# Patient Record
Sex: MALE | Race: BLACK OR AFRICAN AMERICAN | NOT HISPANIC OR LATINO
[De-identification: names, ages, dates, MRNs, and addresses within clinical notes are randomized per-mention and may not be internally consistent; named-entity substitution may affect disease eponyms.]

---

## 2022-06-13 PROBLEM — Z00.00 ENCOUNTER FOR PREVENTIVE HEALTH EXAMINATION: Status: ACTIVE | Noted: 2022-06-13

## 2022-06-16 ENCOUNTER — APPOINTMENT (OUTPATIENT)
Dept: SURGERY | Facility: CLINIC | Age: 67
End: 2022-06-16
Payer: MEDICARE

## 2022-06-16 VITALS
BODY MASS INDEX: 26.93 KG/M2 | HEIGHT: 63 IN | SYSTOLIC BLOOD PRESSURE: 151 MMHG | DIASTOLIC BLOOD PRESSURE: 83 MMHG | HEART RATE: 85 BPM | WEIGHT: 152 LBS

## 2022-06-16 VITALS — TEMPERATURE: 97.3 F

## 2022-06-16 DIAGNOSIS — Z78.9 OTHER SPECIFIED HEALTH STATUS: ICD-10-CM

## 2022-06-16 DIAGNOSIS — I10 ESSENTIAL (PRIMARY) HYPERTENSION: ICD-10-CM

## 2022-06-16 DIAGNOSIS — K29.70 GASTRITIS, UNSPECIFIED, W/OUT BLEEDING: ICD-10-CM

## 2022-06-16 DIAGNOSIS — Z01.818 ENCOUNTER FOR OTHER PREPROCEDURAL EXAMINATION: ICD-10-CM

## 2022-06-16 PROCEDURE — 99203 OFFICE O/P NEW LOW 30 MIN: CPT

## 2022-06-16 RX ORDER — CHLORTHALIDONE 25 MG/1
25 TABLET ORAL
Qty: 90 | Refills: 0 | Status: ACTIVE | COMMUNITY
Start: 2022-06-09

## 2022-06-16 RX ORDER — AMLODIPINE BESYLATE 10 MG/1
10 TABLET ORAL
Qty: 90 | Refills: 0 | Status: ACTIVE | COMMUNITY
Start: 2022-05-24

## 2022-06-16 RX ORDER — SILDENAFIL 100 MG/1
100 TABLET, FILM COATED ORAL
Qty: 4 | Refills: 0 | Status: ACTIVE | COMMUNITY
Start: 2022-06-09

## 2022-06-16 NOTE — REVIEW OF SYSTEMS
[Fever] : no fever [Chills] : no chills [Feeling Poorly] : not feeling poorly [Chest Pain] : no chest pain [Lower Ext Edema] : no extremity edema [Shortness Of Breath] : no shortness of breath [Cough] : no cough [Abdominal Pain] : no abdominal pain [Skin Lesions] : no skin lesions [Skin Wound] : no skin wound [Itching] : no itching [Dizziness] : no dizziness [Anxiety] : no anxiety [Muscle Weakness] : no muscle weakness [Swollen Glands] : no swollen glands [FreeTextEntry8] : large bulge, left side of groin

## 2022-06-16 NOTE — PLAN
[FreeTextEntry1] : Mr. ALMA DELIA BENOIT  was informed of significance of findings. All options, risks and benefits were discussed at length. Informed consent for repair of left inguinoscrotal hernia, with the use of mesh, and the potential post op complications were discussed, including infection, recurrence and other related complications. \par The patient wishes to proceed with the planned surgery. We will schedule for surgery at the next available date, pending any required insurance pre-certification or pre-approval. Patient agrees to obtain any necessary pre-operative evaluations and testing prior to surgery.\par he was advised to seek immediate medical attention with any acute change in symptoms or with the development of any new or worsening symptoms. \par Patient's questions and concerns addressed to patient's satisfaction, and patient verbalized an understanding of the information discussed.\par \par \par Will schedule for the surgery at [facility] [place]. \par \par \par

## 2022-06-16 NOTE — PHYSICAL EXAM
[No Rash or Lesion] : No rash or lesion [Alert] : alert [Oriented to Person] : oriented to person [Oriented to Place] : oriented to place [Oriented to Time] : oriented to time [Calm] : calm [de-identified] : A/Ox3; NAD. appears comfortable [de-identified] : EOMI, sclera anicteric  [de-identified] : no cervical lymphadenopathy  [de-identified] : airway patent  [de-identified] : normal HR, no LE edema  [de-identified] : abd is soft, NT/ND\par  [de-identified] : No penile discharge or lesions. No scrotal swelling or discoloration. Testes descended bilaterally, smooth, without masses. Epididymis non-tender. Left Inguinoscrotal Hernia  [de-identified] : normal gait

## 2022-06-16 NOTE — CONSULT LETTER
[Dear  ___] : Dear  [unfilled], [Consult Letter:] : I had the pleasure of evaluating your patient, [unfilled]. [Consult Closing:] : Thank you very much for allowing me to participate in the care of this patient.  If you have any questions, please do not hesitate to contact me. [Sincerely,] : Sincerely, [FreeTextEntry3] : Arpan Dodge MD\par

## 2022-06-16 NOTE — ASSESSMENT
[FreeTextEntry1] : Mr. BENOIT is a 67 year y/o M who presents with large left inguinoscrotal hernia.

## 2022-06-16 NOTE — HISTORY OF PRESENT ILLNESS
[de-identified] : ALMA DELIA BENOIT is a 67 year old M who is referred to the office for consultation visit, he presents w the cc of having a bulge to the left side of groin, which is bothersome. He wants to know if he has a hernia.

## 2022-06-29 ENCOUNTER — OUTPATIENT (OUTPATIENT)
Dept: OUTPATIENT SERVICES | Facility: HOSPITAL | Age: 67
LOS: 1 days | End: 2022-06-29
Payer: MEDICARE

## 2022-06-29 VITALS
DIASTOLIC BLOOD PRESSURE: 82 MMHG | SYSTOLIC BLOOD PRESSURE: 147 MMHG | TEMPERATURE: 98 F | HEIGHT: 62 IN | RESPIRATION RATE: 16 BRPM | WEIGHT: 149.91 LBS | HEART RATE: 74 BPM

## 2022-06-29 DIAGNOSIS — Z98.890 OTHER SPECIFIED POSTPROCEDURAL STATES: Chronic | ICD-10-CM

## 2022-06-29 DIAGNOSIS — I10 ESSENTIAL (PRIMARY) HYPERTENSION: ICD-10-CM

## 2022-06-29 DIAGNOSIS — K40.90 UNILATERAL INGUINAL HERNIA, WITHOUT OBSTRUCTION OR GANGRENE, NOT SPECIFIED AS RECURRENT: ICD-10-CM

## 2022-06-29 DIAGNOSIS — Z01.818 ENCOUNTER FOR OTHER PREPROCEDURAL EXAMINATION: ICD-10-CM

## 2022-06-29 PROCEDURE — G0463: CPT

## 2022-06-29 RX ORDER — CHLORTHALIDONE 50 MG
1 TABLET ORAL
Qty: 0 | Refills: 0 | DISCHARGE

## 2022-06-29 RX ORDER — AMLODIPINE BESYLATE 2.5 MG/1
1 TABLET ORAL
Qty: 0 | Refills: 0 | DISCHARGE

## 2022-06-29 NOTE — H&P PST ADULT - NSICDXPASTMEDICALHX_GEN_ALL_CORE_FT
PAST MEDICAL HISTORY:  HTN (hypertension)     Unilateral inguinal hernia, without obstruction or gangrene, not specified as recurrent

## 2022-06-29 NOTE — H&P PST ADULT - PROBLEM SELECTOR PLAN 1
Continue antihypertensive meds and take with sips of water on day of surgery.  Cleared by PCP. Follow-up with PCP postop for management.

## 2022-06-29 NOTE — H&P PST ADULT - NSANTHOSAYNRD_GEN_A_CORE
No. SALUD screening performed.  STOP BANG Legend: 0-2 = LOW Risk; 3-4 = INTERMEDIATE Risk; 5-8 = HIGH Risk

## 2022-06-29 NOTE — H&P PST ADULT - HISTORY OF PRESENT ILLNESS
67 yr old male with history of HTN presents with unilateral inguinal hernia without obstruction or gangrene not specified as recurrent . Pt states he was reaching to fix something lost his balance and fell when he stood up he felt something in his left groin this was about 4 month ago. Pt had seen his doctor and was referred to surgeon. Pt schedule for repair of left inguinoscrotal hernia with mesh on 7/13/2022.

## 2022-06-29 NOTE — H&P PST ADULT - NSICDXFAMILYHX_GEN_ALL_CORE_FT
FAMILY HISTORY:  Mother  Still living? No  Known health problems: none, Age at diagnosis: Age Unknown

## 2022-06-29 NOTE — H&P PST ADULT - PROBLEM SELECTOR PLAN 2
schedule for repair of left inguinoscrotal hernia with mesh. Pt instructed to be NPO the night before surgery and the morning of surgery except for sip of water with BP medication. Pt instructed to wash with chlorhexidene 4% for three days including the morning of surgery. Pt provided with written instructions and verbal review with pt .    Stop Bang Score = 3 Pt is intermediate risk for SALUD. Pt will main patent airway during hospital stay.

## 2022-07-08 ENCOUNTER — OUTPATIENT (OUTPATIENT)
Dept: OUTPATIENT SERVICES | Facility: HOSPITAL | Age: 67
LOS: 1 days | End: 2022-07-08
Payer: MEDICARE

## 2022-07-08 DIAGNOSIS — Z98.890 OTHER SPECIFIED POSTPROCEDURAL STATES: Chronic | ICD-10-CM

## 2022-07-08 DIAGNOSIS — Z01.818 ENCOUNTER FOR OTHER PREPROCEDURAL EXAMINATION: ICD-10-CM

## 2022-07-08 DIAGNOSIS — K40.40 UNILATERAL INGUINAL HERNIA, WITH GANGRENE, NOT SPECIFIED AS RECURRENT: ICD-10-CM

## 2022-07-08 PROBLEM — K40.90 UNILATERAL INGUINAL HERNIA, WITHOUT OBSTRUCTION OR GANGRENE, NOT SPECIFIED AS RECURRENT: Chronic | Status: ACTIVE | Noted: 2022-06-29

## 2022-07-08 PROBLEM — I10 ESSENTIAL (PRIMARY) HYPERTENSION: Chronic | Status: ACTIVE | Noted: 2022-06-29

## 2022-07-08 LAB
ANION GAP SERPL CALC-SCNC: 8 MMOL/L — SIGNIFICANT CHANGE UP (ref 5–17)
APTT BLD: 37.4 SEC — HIGH (ref 27.5–35.5)
BUN SERPL-MCNC: 15 MG/DL — SIGNIFICANT CHANGE UP (ref 7–18)
CALCIUM SERPL-MCNC: 9.7 MG/DL — SIGNIFICANT CHANGE UP (ref 8.4–10.5)
CHLORIDE SERPL-SCNC: 101 MMOL/L — SIGNIFICANT CHANGE UP (ref 96–108)
CO2 SERPL-SCNC: 29 MMOL/L — SIGNIFICANT CHANGE UP (ref 22–31)
CREAT SERPL-MCNC: 1.15 MG/DL — SIGNIFICANT CHANGE UP (ref 0.5–1.3)
EGFR: 70 ML/MIN/1.73M2 — SIGNIFICANT CHANGE UP
GLUCOSE SERPL-MCNC: 151 MG/DL — HIGH (ref 70–99)
HCT VFR BLD CALC: 41.7 % — SIGNIFICANT CHANGE UP (ref 39–50)
HGB BLD-MCNC: 14.3 G/DL — SIGNIFICANT CHANGE UP (ref 13–17)
INR BLD: 1.08 RATIO — SIGNIFICANT CHANGE UP (ref 0.88–1.16)
MCHC RBC-ENTMCNC: 29.7 PG — SIGNIFICANT CHANGE UP (ref 27–34)
MCHC RBC-ENTMCNC: 34.3 GM/DL — SIGNIFICANT CHANGE UP (ref 32–36)
MCV RBC AUTO: 86.5 FL — SIGNIFICANT CHANGE UP (ref 80–100)
NRBC # BLD: 0 /100 WBCS — SIGNIFICANT CHANGE UP (ref 0–0)
PLATELET # BLD AUTO: 268 K/UL — SIGNIFICANT CHANGE UP (ref 150–400)
POTASSIUM SERPL-MCNC: 3.5 MMOL/L — SIGNIFICANT CHANGE UP (ref 3.5–5.3)
POTASSIUM SERPL-SCNC: 3.5 MMOL/L — SIGNIFICANT CHANGE UP (ref 3.5–5.3)
PROTHROM AB SERPL-ACNC: 12.9 SEC — SIGNIFICANT CHANGE UP (ref 10.5–13.4)
RBC # BLD: 4.82 M/UL — SIGNIFICANT CHANGE UP (ref 4.2–5.8)
RBC # FLD: 11.5 % — SIGNIFICANT CHANGE UP (ref 10.3–14.5)
SODIUM SERPL-SCNC: 138 MMOL/L — SIGNIFICANT CHANGE UP (ref 135–145)
WBC # BLD: 7.42 K/UL — SIGNIFICANT CHANGE UP (ref 3.8–10.5)
WBC # FLD AUTO: 7.42 K/UL — SIGNIFICANT CHANGE UP (ref 3.8–10.5)

## 2022-07-08 PROCEDURE — 71046 X-RAY EXAM CHEST 2 VIEWS: CPT

## 2022-07-08 PROCEDURE — 85730 THROMBOPLASTIN TIME PARTIAL: CPT

## 2022-07-08 PROCEDURE — 85027 COMPLETE CBC AUTOMATED: CPT

## 2022-07-08 PROCEDURE — 80048 BASIC METABOLIC PNL TOTAL CA: CPT

## 2022-07-08 PROCEDURE — 93005 ELECTROCARDIOGRAM TRACING: CPT

## 2022-07-08 PROCEDURE — 93010 ELECTROCARDIOGRAM REPORT: CPT

## 2022-07-08 PROCEDURE — 36415 COLL VENOUS BLD VENIPUNCTURE: CPT

## 2022-07-08 PROCEDURE — 71046 X-RAY EXAM CHEST 2 VIEWS: CPT | Mod: 26

## 2022-07-08 PROCEDURE — 85610 PROTHROMBIN TIME: CPT

## 2022-07-11 LAB — SARS-COV-2 N GENE NPH QL NAA+PROBE: NOT DETECTED

## 2022-07-12 ENCOUNTER — TRANSCRIPTION ENCOUNTER (OUTPATIENT)
Age: 67
End: 2022-07-12

## 2022-07-13 ENCOUNTER — TRANSCRIPTION ENCOUNTER (OUTPATIENT)
Age: 67
End: 2022-07-13

## 2022-07-13 ENCOUNTER — RESULT REVIEW (OUTPATIENT)
Age: 67
End: 2022-07-13

## 2022-07-13 ENCOUNTER — OUTPATIENT (OUTPATIENT)
Dept: OUTPATIENT SERVICES | Facility: HOSPITAL | Age: 67
LOS: 1 days | End: 2022-07-13
Payer: MEDICARE

## 2022-07-13 ENCOUNTER — APPOINTMENT (OUTPATIENT)
Dept: SURGERY | Facility: HOSPITAL | Age: 67
End: 2022-07-13

## 2022-07-13 VITALS
TEMPERATURE: 99 F | OXYGEN SATURATION: 98 % | HEIGHT: 62 IN | WEIGHT: 149.91 LBS | DIASTOLIC BLOOD PRESSURE: 82 MMHG | HEART RATE: 96 BPM | RESPIRATION RATE: 16 BRPM | SYSTOLIC BLOOD PRESSURE: 135 MMHG

## 2022-07-13 VITALS
RESPIRATION RATE: 16 BRPM | DIASTOLIC BLOOD PRESSURE: 76 MMHG | TEMPERATURE: 98 F | HEART RATE: 88 BPM | OXYGEN SATURATION: 98 % | SYSTOLIC BLOOD PRESSURE: 137 MMHG

## 2022-07-13 DIAGNOSIS — Z01.818 ENCOUNTER FOR OTHER PREPROCEDURAL EXAMINATION: ICD-10-CM

## 2022-07-13 DIAGNOSIS — K40.90 UNILATERAL INGUINAL HERNIA, WITHOUT OBSTRUCTION OR GANGRENE, NOT SPECIFIED AS RECURRENT: ICD-10-CM

## 2022-07-13 DIAGNOSIS — Z98.890 OTHER SPECIFIED POSTPROCEDURAL STATES: Chronic | ICD-10-CM

## 2022-07-13 PROCEDURE — 49505 PRP I/HERN INIT REDUC >5 YR: CPT

## 2022-07-13 PROCEDURE — 88304 TISSUE EXAM BY PATHOLOGIST: CPT | Mod: 26

## 2022-07-13 PROCEDURE — 49505 PRP I/HERN INIT REDUC >5 YR: CPT | Mod: LT

## 2022-07-13 PROCEDURE — C1781: CPT

## 2022-07-13 PROCEDURE — 88304 TISSUE EXAM BY PATHOLOGIST: CPT

## 2022-07-13 DEVICE — MESH HERNIA MARLEX 3X6IN: Type: IMPLANTABLE DEVICE | Site: LEFT | Status: FUNCTIONAL

## 2022-07-13 DEVICE — MESH HERNIA PLUG PERFIX LG 1.6X1.9IN: Type: IMPLANTABLE DEVICE | Site: LEFT | Status: FUNCTIONAL

## 2022-07-13 DEVICE — MESH HERNIA PLUG PERFIX XL 1.6X2IN: Type: IMPLANTABLE DEVICE | Site: LEFT | Status: FUNCTIONAL

## 2022-07-13 DEVICE — MESH HERNIA PLUG PERFIX MED 1.3X1.55IN: Type: IMPLANTABLE DEVICE | Site: LEFT | Status: FUNCTIONAL

## 2022-07-13 RX ORDER — FENTANYL CITRATE 50 UG/ML
25 INJECTION INTRAVENOUS
Refills: 0 | Status: DISCONTINUED | OUTPATIENT
Start: 2022-07-13 | End: 2022-07-13

## 2022-07-13 RX ORDER — OXYCODONE HYDROCHLORIDE 5 MG/1
1 TABLET ORAL
Qty: 3 | Refills: 0
Start: 2022-07-13

## 2022-07-13 RX ORDER — HYDROMORPHONE HYDROCHLORIDE 2 MG/ML
0.5 INJECTION INTRAMUSCULAR; INTRAVENOUS; SUBCUTANEOUS
Refills: 0 | Status: DISCONTINUED | OUTPATIENT
Start: 2022-07-13 | End: 2022-07-13

## 2022-07-13 RX ORDER — ONDANSETRON 8 MG/1
4 TABLET, FILM COATED ORAL ONCE
Refills: 0 | Status: DISCONTINUED | OUTPATIENT
Start: 2022-07-13 | End: 2022-07-13

## 2022-07-13 RX ORDER — ACETAMINOPHEN 500 MG
1000 TABLET ORAL ONCE
Refills: 0 | Status: DISCONTINUED | OUTPATIENT
Start: 2022-07-13 | End: 2022-07-13

## 2022-07-13 RX ORDER — SODIUM CHLORIDE 9 MG/ML
3 INJECTION INTRAMUSCULAR; INTRAVENOUS; SUBCUTANEOUS EVERY 8 HOURS
Refills: 0 | Status: DISCONTINUED | OUTPATIENT
Start: 2022-07-13 | End: 2022-07-13

## 2022-07-13 RX ADMIN — SODIUM CHLORIDE 3 MILLILITER(S): 9 INJECTION INTRAMUSCULAR; INTRAVENOUS; SUBCUTANEOUS at 06:51

## 2022-07-13 NOTE — BRIEF OPERATIVE NOTE - NSICDXBRIEFPROCEDURE_GEN_ALL_CORE_FT
PROCEDURES:  Repair, hernia, inguinal, open, using mesh, adult 13-Jul-2022 10:04:29 left side Ruth Higgins

## 2022-07-13 NOTE — ASU DISCHARGE PLAN (ADULT/PEDIATRIC) - ASU DC SPECIAL INSTRUCTIONSFT
Please follow-up with your surgeon in 1 week. Drink plenty of fluids and rest as needed. Call for any fever over 101, nausea, vomiting, severe pain, no passing of gas or bowel movement.     DIET   You may resume your regular diet as normal.     SURGICAL SITES   Remove outer dressing and keep white steri-strips in place allowing them to fall off on their own. You may shower 48 hours post-operatively but do not bathe or soak in the water for 1-2 weeks; pat dry. If you notice any signs of surgical site infection (ie. redness, swelling, pain, pus drainage), please seek medical care immediately.    ACTIVITY Do not lift anything heavier than 10 pounds for 2-3 months for hernia, no exercise for 2 weeks and avoid strenuous activity for 4-6 weeks.     PAIN CONTROL   You may take Motrin 600mg (with food) or Tylenol 650mg as needed for mild pain. Stagger one medication 3 hours after the other for maximum pain control. Maximum daily dose of Tylenol should not exceed 4grams/day.  You may take your prescribed oxycodone for severe breakthrough pain not that is not relieved by Tylenol/Motrin. Do not drive or make important decisions while taking this medication and do not take more than 4 pills in 24 hours.Please refer to medical records/ progress notes for in depth hospital course. Discharge plan discussed and agreed with attending.

## 2022-07-13 NOTE — ASU DISCHARGE PLAN (ADULT/PEDIATRIC) - NS MD DC FALL RISK RISK
For information on Fall & Injury Prevention, visit: [email or web address] OR  [email or web address] OR  https://www.cdc.gov/steadi/patient.html

## 2022-07-13 NOTE — ASU PREOP CHECKLIST - NS PREOP CHK TEST_COVID RESULT_GEN_ALL_CORE
Brief Intervention and Referral to Treatment Summary    Patient was provided PHQ-9, AUDIT and DAST Screening:      PHQ-9 Score: 23  AUDIT Score:  39  DAST Score:  3    Patients substance use is considered     Low Risk/Healthy  Moderate Risk  Harmful  Dependent X     Patients depression is considered:     Minimal  Mild   Moderate  Moderately Severe  Severe X     Brief Education Was Provided     Patient was receptive X   Patient was not receptive      Brief Intervention Is Provided (Only for AUDIT or DAST)     Patient reports readiness to decrease and/or stop use and a plan was discussed  X  Patient denies readiness to decrease and/or stop use and a plan was not discussed      Recommendations/Referrals for Brief and/or Specialized Treatment Provided to Patient     Pt was agreeable to referral to [facility]. Pt UDS was negative. Pt ETOH was .169. Pt reports daily drinking and would like to address his alcoholism.  Electronically signed by KRYSTEN Hurtado on 1/14/2022 at 2:21 PM Negative

## 2022-07-13 NOTE — ASU PATIENT PROFILE, ADULT - FALL HARM RISK - UNIVERSAL INTERVENTIONS
Bed in lowest position, wheels locked, appropriate side rails in place/Call bell, personal items and telephone in reach/Instruct patient to call for assistance before getting out of bed or chair/Non-slip footwear when patient is out of bed/[place] to call system/Physically safe environment - no spills, clutter or unnecessary equipment/Purposeful Proactive Rounding/Room/bathroom lighting operational, light cord in reach

## 2022-07-13 NOTE — ASU DISCHARGE PLAN (ADULT/PEDIATRIC) - CARE PROVIDER_API CALL
Arpan Dodge (MD)  Surgery  [address] [place], [place] [id number]  Phone: [phone]  Fax: [phone]  Follow Up Time: 1 week

## 2022-07-15 LAB — SURGICAL PATHOLOGY STUDY: SIGNIFICANT CHANGE UP

## 2022-07-21 ENCOUNTER — APPOINTMENT (OUTPATIENT)
Dept: SURGERY | Facility: CLINIC | Age: 67
End: 2022-07-21

## 2022-07-21 VITALS
HEART RATE: 75 BPM | WEIGHT: 148 LBS | OXYGEN SATURATION: 98 % | SYSTOLIC BLOOD PRESSURE: 144 MMHG | HEIGHT: 63 IN | DIASTOLIC BLOOD PRESSURE: 78 MMHG | BODY MASS INDEX: 26.22 KG/M2

## 2022-07-21 DIAGNOSIS — K40.90 UNILATERAL INGUINAL HERNIA, W/OUT OBSTRUCTION OR GANGRENE, NOT SPECIFIED AS RECURRENT: ICD-10-CM

## 2022-07-21 PROCEDURE — 99024 POSTOP FOLLOW-UP VISIT: CPT

## 2022-07-21 NOTE — ASSESSMENT
[FreeTextEntry1] : Mr. BENOIT is a 67 year y/o M, S/P Repair of left inguinoscrotal hernia with mesh, 07/13/22. Mr. BENOIT is without reported complaints. He denies fever, chills, or pain. Surgical wounds are healing well. No signs of inflammation, infection or exudate. No recurrence felt on exam. \par \par \par

## 2022-07-21 NOTE — HISTORY OF PRESENT ILLNESS
[de-identified] : FRANCISCO JAVIER BENOIT presents to the office for postoperative visit today, he is S/P Repair of left inguinoscrotal hernia with mesh, 07/13/22. Mr. BENOIT is without reported complaints. He denies fever, chills, or pain. \par \par

## 2022-07-21 NOTE — PHYSICAL EXAM
[No Rash or Lesion] : No rash or lesion [Alert] : alert [Oriented to Person] : oriented to person [Oriented to Place] : oriented to place [Oriented to Time] : oriented to time [Calm] : calm [de-identified] : A/Ox3; NAD. appears comfortable [de-identified] : airway patent, no use of accessory muscles [de-identified] : Surgical wound healing well. No signs of inflammation, infection or exudate. No recurrence

## 2024-12-25 ENCOUNTER — INPATIENT (INPATIENT)
Facility: HOSPITAL | Age: 69
LOS: 1 days | Discharge: NURSING, REHAB OR LONG-TERM CARE | End: 2024-12-27
Attending: INTERNAL MEDICINE | Admitting: INTERNAL MEDICINE
Payer: MEDICARE

## 2024-12-25 VITALS
TEMPERATURE: 98 F | SYSTOLIC BLOOD PRESSURE: 148 MMHG | DIASTOLIC BLOOD PRESSURE: 85 MMHG | RESPIRATION RATE: 20 BRPM | OXYGEN SATURATION: 95 % | HEART RATE: 96 BPM

## 2024-12-25 DIAGNOSIS — I63.9 CEREBRAL INFARCTION, UNSPECIFIED: ICD-10-CM

## 2024-12-25 DIAGNOSIS — I10 ESSENTIAL (PRIMARY) HYPERTENSION: ICD-10-CM
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
## 2024-12-25 DIAGNOSIS — Z98.890 OTHER SPECIFIED POSTPROCEDURAL STATES: Chronic | ICD-10-CM

## 2024-12-25 DIAGNOSIS — I63.50 CEREBRAL INFARCTION DUE TO UNSPECIFIED OCCLUSION OR STENOSIS OF UNSPECIFIED CEREBRAL ARTERY: ICD-10-CM

## 2024-12-25 DIAGNOSIS — R53.1 WEAKNESS: ICD-10-CM

## 2024-12-25 DIAGNOSIS — Z29.9 ENCOUNTER FOR PROPHYLACTIC MEASURES, UNSPECIFIED: ICD-10-CM

## 2024-12-25 DIAGNOSIS — S22.39XA FRACTURE OF ONE RIB, UNSPECIFIED SIDE, INITIAL ENCOUNTER FOR CLOSED FRACTURE: ICD-10-CM

## 2024-12-25 LAB
ALBUMIN SERPL ELPH-MCNC: 4.2 G/DL — SIGNIFICANT CHANGE UP (ref 3.3–5)
ALP SERPL-CCNC: 65 U/L — SIGNIFICANT CHANGE UP (ref 40–120)
ALT FLD-CCNC: 17 U/L — SIGNIFICANT CHANGE UP (ref 4–41)
ANION GAP SERPL CALC-SCNC: 16 MMOL/L — HIGH (ref 7–14)
APTT BLD: 34.9 SEC — SIGNIFICANT CHANGE UP (ref 24.5–35.6)
AST SERPL-CCNC: 20 U/L — SIGNIFICANT CHANGE UP (ref 4–40)
BASOPHILS # BLD AUTO: 0.02 K/UL — SIGNIFICANT CHANGE UP (ref 0–0.2)
BASOPHILS NFR BLD AUTO: 0.2 % — SIGNIFICANT CHANGE UP (ref 0–2)
BILIRUB SERPL-MCNC: 0.5 MG/DL — SIGNIFICANT CHANGE UP (ref 0.2–1.2)
BUN SERPL-MCNC: 13 MG/DL — SIGNIFICANT CHANGE UP (ref 7–23)
CALCIUM SERPL-MCNC: 9.9 MG/DL — SIGNIFICANT CHANGE UP (ref 8.4–10.5)
CHLORIDE SERPL-SCNC: 96 MMOL/L — LOW (ref 98–107)
CO2 SERPL-SCNC: 24 MMOL/L — SIGNIFICANT CHANGE UP (ref 22–31)
CREAT SERPL-MCNC: 1.03 MG/DL — SIGNIFICANT CHANGE UP (ref 0.5–1.3)
EGFR: 79 ML/MIN/1.73M2 — SIGNIFICANT CHANGE UP
EOSINOPHIL # BLD AUTO: 0 K/UL — SIGNIFICANT CHANGE UP (ref 0–0.5)
EOSINOPHIL NFR BLD AUTO: 0 % — SIGNIFICANT CHANGE UP (ref 0–6)
FLUAV AG NPH QL: SIGNIFICANT CHANGE UP
FLUBV AG NPH QL: SIGNIFICANT CHANGE UP
GLUCOSE SERPL-MCNC: 109 MG/DL — HIGH (ref 70–99)
HCT VFR BLD CALC: 42.6 % — SIGNIFICANT CHANGE UP (ref 39–50)
HGB BLD-MCNC: 14.6 G/DL — SIGNIFICANT CHANGE UP (ref 13–17)
IANC: 7.9 K/UL — HIGH (ref 1.8–7.4)
IMM GRANULOCYTES NFR BLD AUTO: 0.2 % — SIGNIFICANT CHANGE UP (ref 0–0.9)
INR BLD: 1.07 RATIO — SIGNIFICANT CHANGE UP (ref 0.85–1.16)
LYMPHOCYTES # BLD AUTO: 1.38 K/UL — SIGNIFICANT CHANGE UP (ref 1–3.3)
LYMPHOCYTES # BLD AUTO: 14.1 % — SIGNIFICANT CHANGE UP (ref 13–44)
MCHC RBC-ENTMCNC: 29.1 PG — SIGNIFICANT CHANGE UP (ref 27–34)
MCHC RBC-ENTMCNC: 34.3 G/DL — SIGNIFICANT CHANGE UP (ref 32–36)
MCV RBC AUTO: 85 FL — SIGNIFICANT CHANGE UP (ref 80–100)
MONOCYTES # BLD AUTO: 0.44 K/UL — SIGNIFICANT CHANGE UP (ref 0–0.9)
MONOCYTES NFR BLD AUTO: 4.5 % — SIGNIFICANT CHANGE UP (ref 2–14)
NEUTROPHILS # BLD AUTO: 7.9 K/UL — HIGH (ref 1.8–7.4)
NEUTROPHILS NFR BLD AUTO: 81 % — HIGH (ref 43–77)
NRBC # BLD: 0 /100 WBCS — SIGNIFICANT CHANGE UP (ref 0–0)
NRBC # FLD: 0 K/UL — SIGNIFICANT CHANGE UP (ref 0–0)
PLATELET # BLD AUTO: 289 K/UL — SIGNIFICANT CHANGE UP (ref 150–400)
POTASSIUM SERPL-MCNC: 3.7 MMOL/L — SIGNIFICANT CHANGE UP (ref 3.5–5.3)
POTASSIUM SERPL-SCNC: 3.7 MMOL/L — SIGNIFICANT CHANGE UP (ref 3.5–5.3)
PROT SERPL-MCNC: 8.6 G/DL — HIGH (ref 6–8.3)
PROTHROM AB SERPL-ACNC: 12.4 SEC — SIGNIFICANT CHANGE UP (ref 9.9–13.4)
RBC # BLD: 5.01 M/UL — SIGNIFICANT CHANGE UP (ref 4.2–5.8)
RBC # FLD: 11.4 % — SIGNIFICANT CHANGE UP (ref 10.3–14.5)
RSV RNA NPH QL NAA+NON-PROBE: SIGNIFICANT CHANGE UP
SARS-COV-2 RNA SPEC QL NAA+PROBE: SIGNIFICANT CHANGE UP
SODIUM SERPL-SCNC: 136 MMOL/L — SIGNIFICANT CHANGE UP (ref 135–145)
TROPONIN T, HIGH SENSITIVITY RESULT: 10 NG/L — SIGNIFICANT CHANGE UP
TROPONIN T, HIGH SENSITIVITY RESULT: 10 NG/L — SIGNIFICANT CHANGE UP
WBC # BLD: 9.76 K/UL — SIGNIFICANT CHANGE UP (ref 3.8–10.5)
WBC # FLD AUTO: 9.76 K/UL — SIGNIFICANT CHANGE UP (ref 3.8–10.5)

## 2024-12-25 PROCEDURE — 99233 SBSQ HOSP IP/OBS HIGH 50: CPT

## 2024-12-25 PROCEDURE — 71046 X-RAY EXAM CHEST 2 VIEWS: CPT | Mod: 26

## 2024-12-25 PROCEDURE — 70496 CT ANGIOGRAPHY HEAD: CPT | Mod: 26,MC

## 2024-12-25 PROCEDURE — 70498 CT ANGIOGRAPHY NECK: CPT | Mod: 26,MC

## 2024-12-25 PROCEDURE — 71100 X-RAY EXAM RIBS UNI 2 VIEWS: CPT | Mod: 26,LT

## 2024-12-25 PROCEDURE — 99285 EMERGENCY DEPT VISIT HI MDM: CPT

## 2024-12-25 PROCEDURE — 99497 ADVNCD CARE PLAN 30 MIN: CPT

## 2024-12-25 RX ORDER — ENOXAPARIN SODIUM 60 MG/.6ML
40 INJECTION INTRAVENOUS; SUBCUTANEOUS EVERY 24 HOURS
Refills: 0 | Status: DISCONTINUED | OUTPATIENT
Start: 2024-12-25 | End: 2024-12-27

## 2024-12-25 RX ORDER — ASPIRIN 81 MG
81 TABLET, DELAYED RELEASE (ENTERIC COATED) ORAL ONCE
Refills: 0 | Status: COMPLETED | OUTPATIENT
Start: 2024-12-25 | End: 2024-12-25

## 2024-12-25 RX ORDER — ATORVASTATIN CALCIUM 40 MG/1
80 TABLET, FILM COATED ORAL AT BEDTIME
Refills: 0 | Status: DISCONTINUED | OUTPATIENT
Start: 2024-12-25 | End: 2024-12-27

## 2024-12-25 RX ORDER — ACETAMINOPHEN 80 MG/.8ML
1000 SOLUTION/ DROPS ORAL ONCE
Refills: 0 | Status: COMPLETED | OUTPATIENT
Start: 2024-12-25 | End: 2024-12-25

## 2024-12-25 RX ORDER — GINKGO BILOBA 40 MG
3 CAPSULE ORAL AT BEDTIME
Refills: 0 | Status: DISCONTINUED | OUTPATIENT
Start: 2024-12-25 | End: 2024-12-27

## 2024-12-25 RX ORDER — CLOPIDOGREL BISULFATE 75 MG/1
300 TABLET, FILM COATED ORAL ONCE
Refills: 0 | Status: COMPLETED | OUTPATIENT
Start: 2024-12-25 | End: 2024-12-25

## 2024-12-25 RX ORDER — ACETAMINOPHEN 80 MG/.8ML
650 SOLUTION/ DROPS ORAL EVERY 6 HOURS
Refills: 0 | Status: DISCONTINUED | OUTPATIENT
Start: 2024-12-25 | End: 2024-12-26

## 2024-12-25 RX ORDER — LIDOCAINE 50 MG/G
1 OINTMENT TOPICAL DAILY
Refills: 0 | Status: DISCONTINUED | OUTPATIENT
Start: 2024-12-25 | End: 2024-12-26

## 2024-12-25 RX ORDER — INFLUENZA A VIRUS A/WISCONSIN/588/2019 (H1N1) RECOMBINANT HEMAGGLUTININ ANTIGEN, INFLUENZA A VIRUS A/DARWIN/6/2021 (H3N2) RECOMBINANT HEMAGGLUTININ ANTIGEN, INFLUENZA B VIRUS B/AUSTRIA/1359417/2021 RECOMBINANT HEMAGGLUTININ ANTIGEN, AND INFLUENZA B VIRUS B/PHUKET/3073/2013 RECOMBINANT HEMAGGLUTININ ANTIGEN 45; 45; 45; 45 UG/.5ML; UG/.5ML; UG/.5ML; UG/.5ML
0.5 INJECTION INTRAMUSCULAR ONCE
Refills: 0 | Status: DISCONTINUED | OUTPATIENT
Start: 2024-12-25 | End: 2024-12-27

## 2024-12-25 RX ORDER — CLOPIDOGREL BISULFATE 75 MG/1
75 TABLET, FILM COATED ORAL DAILY
Refills: 0 | Status: DISCONTINUED | OUTPATIENT
Start: 2024-12-25 | End: 2024-12-27

## 2024-12-25 RX ORDER — ASPIRIN 81 MG
81 TABLET, DELAYED RELEASE (ENTERIC COATED) ORAL DAILY
Refills: 0 | Status: DISCONTINUED | OUTPATIENT
Start: 2024-12-25 | End: 2024-12-27

## 2024-12-25 RX ADMIN — ACETAMINOPHEN 400 MILLIGRAM(S): 80 SOLUTION/ DROPS ORAL at 17:29

## 2024-12-25 RX ADMIN — CLOPIDOGREL BISULFATE 300 MILLIGRAM(S): 75 TABLET, FILM COATED ORAL at 21:08

## 2024-12-25 RX ADMIN — Medication 81 MILLIGRAM(S): at 21:08

## 2024-12-25 RX ADMIN — ACETAMINOPHEN 1000 MILLIGRAM(S): 80 SOLUTION/ DROPS ORAL at 17:43

## 2024-12-25 NOTE — H&P ADULT - CONVERSATION DETAILS
Goals of care discussion with the patient (face to face)  Asked about the patient's values, hopes, goals, and priorities  Patient would like to be FULL CODE for this admission   Patient would be okay want CPR or mechanical ventilation if needed for lifesaving measures  No Healthcare proxy or POA in place

## 2024-12-25 NOTE — H&P ADULT - HISTORY OF PRESENT ILLNESS
Rigo Da Silva is a 69-year-old male with a past medical history of HTN presenting with a one day history of left sided weakness and a ground level fall.    Patient notes that he was well on the night of 12/23. When he woke up on 12/24, he noticed the left side of his body was weaker. He did not notice vision or voice changes. No headache or pain elsewhere. This weakness continued through the day and night. On 12/25, he was walking along flat ground and then fell. He attributes this fall to weakness. He did not experience head strike or loss of consciousness. He did not experience lightheadedness, vision changes, chest pain, or palpitations before the fall. He called EMS and was brought to the hospital.    Denies fevers/chills, lightheadedness, palpitations, chest pain, shortness of breath, abdominal pain, vision changes, nausea/vomiting, diarrhea/constipation, sick contacts, recent travel.    ED course: VS on presentation: T 98.5, HR 96, /85, RR 20, 95% RA. Code stroke was called. 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 CXR with minimally displaced fracture at the angle of the left 10th rib. Given aspirin, clopidogrel 300 mg, acetaminophen.

## 2024-12-25 NOTE — CONSULT NOTE ADULT - SUBJECTIVE AND OBJECTIVE BOX
**STROKE CONSULT NOTE**    -------------------------------------------------------------------------------------------------------------------------------------------------------------------------------------------------------------------------    HPI: 68yo w PMH of HTN, comes in with left sided weakness which started 12/24???. LKW 12/23???  head trauma, HA, N/V  hx of stroke, seizure  social, family hx    PAST MEDICAL & SURGICAL HISTORY:  HTN (hypertension)      Unilateral inguinal hernia, without obstruction or gangrene, not specified as recurrent      H/O hernia repair  right      History of surgery on arm  fx with metal          FAMILY HISTORY:  Known health problems: none (Mother)        SOCIAL HISTORY:  Smoking Cessation:    MEDICATIONS  (STANDING):  acetaminophen   IVPB .. 1000 milliGRAM(s) IV Intermittent once    MEDICATIONS  (PRN):      Allergies    No Known Allergies    Intolerances        --------------------------------------------------------------------------------------------------------------------------------------------------------------------------------------------------------------------    Vital Signs Last 24 Hrs  T(C): 36.9 (25 Dec 2024 15:10), Max: 36.9 (25 Dec 2024 15:10)  T(F): 98.5 (25 Dec 2024 15:10), Max: 98.5 (25 Dec 2024 15:10)  HR: 96 (25 Dec 2024 15:10) (96 - 96)  BP: 148/85 (25 Dec 2024 15:10) (148/85 - 148/85)  BP(mean): --  RR: 20 (25 Dec 2024 15:10) (20 - 20)  SpO2: 95% (25 Dec 2024 15:10) (95% - 95%)        Fingerstick Blood Glucose: CAPILLARY BLOOD GLUCOSE      POCT Blood Glucose.: 170 mg/dL (25 Dec 2024 15:20)      PHYSICAL EXAM:   INCOMPLETE    General - NAD  Cardiovascular - Peripheral pulses palpable, no edema    NEURO:    Mental status - Awake, Alert, Oriented to person, place, and time. Speech fluent, repetition and naming intact. Follows simple and complex commands. Attention/concentration, and fund of knowledge intact.    Cranial nerves -   PERRL, VFF, EOMI,   face sensation (V1-V3) intact b/l,   facial strength intact without asymmetry b/l,   hearing intact b/l,   palate with symmetric elevation,   trapezius 5/5 strength b/l,   tongue midline on protrusion with full lateral movement    Motor - Normal bulk and tone throughout. No pronator drift.  Strength testing            Deltoid      Biceps      Triceps     Wrist Extension    Wrist Flexion     Interossei         R            5                 5               5                     5                              5                        5                 5  L             5                 5               5                     5                              5                        5                 5              Hip Flexion    Hip Extension    Knee Flexion    Knee Extension    Dorsiflexion    Plantar Flexion  R              5                           5                       5                           5                            5                          5  L              5                           5                        5                           5                            5                          5    Sensation - Light touch AND/OR vibration intact throughout    DTR's -             Biceps      Triceps     Brachioradialis      Patellar    Ankle    Toes/plantar response  R             2+             2+                  2+                       2+            2+                 Down  L              2+             2+                 2+                        2+           2+                 Down    Coordination - Finger to Nose intact b/l. Heel to [place] intact b/l. No tremors appreciated    Gait and station - Normal casual gait. Romberg (-)    ---------------------------------------------------------------------------------------------------------------------------------------------------------------------------------------------------------------------------    Labs:          CAPILLARY BLOOD GLUCOSE      POCT Blood Glucose.: 170 mg/dL (25 Dec 2024 15:20)          CSF:                  Radiology:   **STROKE CONSULT NOTE**    -------------------------------------------------------------------------------------------------------------------------------------------------------------------------------------------------------------------------    HPI: 68yo w PMH of HTN, comes in with left sided weakness which started 12/24 0600 when he woke up. LKW 12/23 2200. Weakness started with the leg and quickly progressed to arm, denies any face weakness, numbness, any speech or vision changes. Weakness worsened to a point where he had a fall while walking on 12/25, hurt his chest and head on left side. Currently denying any headache just mild chest pain. No hx of stroke, seizure, headache. No hx of smoking, occasional alcohol use, no ilicit drug use.        PAST MEDICAL & SURGICAL HISTORY:  HTN (hypertension)      Unilateral inguinal hernia, without obstruction or gangrene, not specified as recurrent      H/O hernia repair  right      History of surgery on arm  fx with metal          FAMILY HISTORY:  Known health problems: none (Mother)        SOCIAL HISTORY:  Smoking Cessation:    MEDICATIONS  (STANDING):  acetaminophen   IVPB .. 1000 milliGRAM(s) IV Intermittent once    MEDICATIONS  (PRN):      Allergies    No Known Allergies    Intolerances        --------------------------------------------------------------------------------------------------------------------------------------------------------------------------------------------------------------------    Vital Signs Last 24 Hrs  T(C): 36.9 (25 Dec 2024 15:10), Max: 36.9 (25 Dec 2024 15:10)  T(F): 98.5 (25 Dec 2024 15:10), Max: 98.5 (25 Dec 2024 15:10)  HR: 96 (25 Dec 2024 15:10) (96 - 96)  BP: 148/85 (25 Dec 2024 15:10) (148/85 - 148/85)  BP(mean): --  RR: 20 (25 Dec 2024 15:10) (20 - 20)  SpO2: 95% (25 Dec 2024 15:10) (95% - 95%)        Fingerstick Blood Glucose: CAPILLARY BLOOD GLUCOSE      POCT Blood Glucose.: 170 mg/dL (25 Dec 2024 15:20)      PHYSICAL EXAM:       General - NAD  Cardiovascular - Peripheral pulses palpable, no edema    NEURO:    Mental status - Awake, Alert, Oriented to person, place, and time. Speech fluent, repetition and naming intact. Follows simple and complex commands.     Cranial nerves -   PERRL, VFF, EOMI,   face sensation (V1-V3) intact b/l,   facial strength intact without asymmetry b/l,   hearing intact b/l,   palate with symmetric elevation,   trapezius 5/5 strength b/l,   tongue midline on protrusion with full lateral movement    Motor - Normal bulk and tone throughout. some antigravity movement of LUE  Strength testing            Deltoid      Biceps      Triceps     Wrist Extension    Wrist Flexion     Interossei         R            5                 5               5                     5                              5                        5                 5  L             3                 3               3                     3                              3                        3                 3              Hip Flexion    Hip Extension    Knee Flexion    Knee Extension    Dorsiflexion    Plantar Flexion  R              5                           5                       5                           5                            5                          5  L              3                           3                        3                          3                            3                          3    Sensation - Light touch intact throughout    DTR's -  1+ throughout, plantars mute    Coordination - Finger to Nose intact on right, unable to check on left. Heel to [place] intact b/l.     Gait and station - unable to test due to pt safety    ---------------------------------------------------------------------------------------------------------------------------------------------------------------------------------------------------------------------------    Labs:          CAPILLARY BLOOD GLUCOSE      POCT Blood Glucose.: 170 mg/dL (25 Dec 2024 15:20)          CSF:                  Radiology:  [facility] read pending, to my eyes, no acute infarct

## 2024-12-25 NOTE — ED ADULT NURSE NOTE - NSFALLHARMRISKINTERV_ED_ALL_ED

## 2024-12-25 NOTE — ED PROVIDER NOTE - CLINICAL SUMMARY MEDICAL DECISION MAKING FREE TEXT BOX
69M p/w L side weakness arm>leg x ~32 hours.  Pt went to bed at about 10-11pm 12/23 and woke up with L side weakness.  Today he was trying to walk and fell landing on L ribs, pt says due to the weakness he fell.  No arm or leg injury, no head injury.  Family report he had slight slurred speech this morning but it got better now.  Pt reports mild dysuria.  PMHX HTN on amlodipine and ?HCTZ.  Not on AC.  PMHX hernia repair, R forearm surgery.  No T.  NKDA.  VS wnl.  On exam mild distress L ribs pain, normal speech per family (english 2nd language).  L ribs ttp no deformity, tender L ribs.  No ecchymosis.  Some L CVAT also, Abd nontender.  L arm weakness 3/5, significantly decreased  strength.  LLE slight drift but distal flex ex of foot intact.  No sensory deficit.  Face symmetric, speech fluent.  Impression stroke L side weakness arm > leg.  Also L ribs ttp rule out fx.  Check CTA H/N eval for ICH or stroke.  Xray rib series.  check labs.  Likely admit for stroke workup - MRI, echo, tele, PT.  Pt will likely need rehab.

## 2024-12-25 NOTE — H&P ADULT - PROBLEM SELECTOR PLAN 3
Feel from ground level attributed to weakness. Less concern for cardiac etiology or syncope without lightheadedness, palpitations, loss of consciousness.    Plan:  - telemetry   - ambulate with assistance  - fall precautions  - PT/OT consult

## 2024-12-25 NOTE — CONSULT NOTE ADULT - ATTENDING COMMENTS
68yo w PMH of HTN, comes in with left sided weakness  O/E LUE no effort against gravity, LLE some effort against gravity NIHSS 5  CTH subtle R Pontine hypoattenuation  CTA: Proximal occlusion of the left vertebral artery with distal reconstitution at the level of C2-C3. Mild R ICA stenosis. left M! stenosis, right P2, midbasilar stenosis      Continue with plan above

## 2024-12-25 NOTE — PATIENT PROFILE ADULT - FALL HARM RISK - HARM RISK INTERVENTIONS

## 2024-12-25 NOTE — H&P ADULT - PROBLEM SELECTOR PLAN 4
CXR with minimally displaced fracture seen at the angle of the left 10th rib.    Plan:  - pain control: lidocaine patches, acetaminophen

## 2024-12-25 NOTE — H&P ADULT - NSHPPHYSICALEXAM_GEN_ALL_CORE
Vital Signs Last 24 Hrs  T(C): 36.7 (25 Dec 2024 20:44), Max: 36.9 (25 Dec 2024 15:10)  T(F): 98.1 (25 Dec 2024 20:44), Max: 98.5 (25 Dec 2024 15:10)  HR: 82 (25 Dec 2024 20:44) (76 - 96)  BP: 159/81 (25 Dec 2024 20:44) (141/81 - 159/81)  BP(mean): --  RR: 18 (25 Dec 2024 20:44) (14 - 20)  SpO2: 100% (25 Dec 2024 20:44) (95% - 100%)    Parameters below as of 25 Dec 2024 20:44  Patient On (Oxygen Delivery Method): room air        PHYSICAL EXAM:  GENERAL: NAD, well-groomed, well-developed  HEAD:  Atraumatic, Normocephalic  EYES: EOMI, PERRLA, conjunctiva and sclera clear, arcus senilis present in bilateral eyes, peripheral vision intact  ENMT: Moist mucous membranes  NECK: Supple  NERVOUS SYSTEM: AOX3, facial sensation intact, facial strength intact, tongue midline with full lateral movement, right upper and lower extremities 5/5 in deltoid, bicep, wrist extension/flexion, interossei. Left upper and lower extremities with 2-3/5 throughout.  PSYCHIATRIC: Appropriate affect and mood  CHEST/LUNG: Clear to auscultation bilaterally; No rales, rhonchi, wheezing, or rubs  HEART: Regular rate and rhythm; No murmurs, rubs, or gallops. No LE edema  ABDOMEN: Soft, Nontender, Nondistended;  EXTREMITIES: No clubbing, cyanosis  SKIN: No rashes or lesions

## 2024-12-25 NOTE — ED ADULT NURSE NOTE - OBJECTIVE STATEMENT
pt presenting to ER with c/o L sided weakness x 2 days.  also reports fall this morning landing on left side of body.  denies head strike or LOC.  pt speaking in full sentences, MANRIQUEZ's.  no changes in speech noted.  labs collected.  pending CTA head and neck r/o stroke.  will cont to monitor.

## 2024-12-25 NOTE — H&P ADULT - NSICDXFAMILYHX_GEN_ALL_CORE_FT
FAMILY HISTORY:  Mother  Still living? No  Known health problems: none, Age at diagnosis: Age Unknown     FAMILY HISTORY:  Mother  Still living? No  Known health problems: none, Age at diagnosis: Age Unknown    Child  Still living? Unknown  FH: stroke, Age at diagnosis: 31-40

## 2024-12-25 NOTE — H&P ADULT - NSHPLABSRESULTS_GEN_ALL_CORE
LABS:                        14.6   9.76  )-----------( 289      ( 25 Dec 2024 17:00 )             42.6     25 Dec 2024 17:00    136    |  96     |  13     ----------------------------<  109    3.7     |  24     |  1.03     Ca    9.9        25 Dec 2024 17:00    TPro  8.6    /  Alb  4.2    /  TBili  0.5    /  DBili  x      /  AST  20     /  ALT  17     /  AlkPhos  65     25 Dec 2024 17:00    PT/INR - ( 25 Dec 2024 17:00 )   PT: 12.4 sec;   INR: 1.07 ratio         PTT - ( 25 Dec 2024 17:00 )  PTT:34.9 sec  CAPILLARY BLOOD GLUCOSE      POCT Blood Glucose.: 170 mg/dL (25 Dec 2024 15:20)    BLOOD CULTURE    RADIOLOGY & ADDITIONAL TESTS:    Imaging Personally Reviewed:  [ ] YES LABS:                        14.6   9.76  )-----------( 289      ( 25 Dec 2024 17:00 )             42.6     25 Dec 2024 17:00    136    |  96     |  13     ----------------------------<  109    3.7     |  24     |  1.03     Ca    9.9        25 Dec 2024 17:00    TPro  8.6    /  Alb  4.2    /  TBili  0.5    /  DBili  x      /  AST  20     /  ALT  17     /  AlkPhos  65     25 Dec 2024 17:00    PT/INR - ( 25 Dec 2024 17:00 )   PT: 12.4 sec;   INR: 1.07 ratio         PTT - ( 25 Dec 2024 17:00 )  PTT:34.9 sec  CAPILLARY BLOOD GLUCOSE      POCT Blood Glucose.: 170 mg/dL (25 Dec 2024 15:20)    BLOOD CULTURE    RADIOLOGY & ADDITIONAL TESTS:    Imaging Personally Reviewed:  [ ] YES    < from: CT Angio Head w/ IV Cont (12.25.24 @ 18:26) >    IMPRESSION:    CT HEAD:  1. Subtle decreased attenuation appreciated within a right pontine   location. Cannot exclude an ischemic event in this location. Consider   further evaluation via MR imaging to include DWI and ADC mapping   techniques, provided there are no contraindications.  2. No abnormal intraparenchymal or leptomeningeal enhancement.    CTA NECK:  1. Proximal occlusion of the left vertebral artery with distal   reconstitution at the level of C2-C3..  2. Right vertebral artery is unremarkable in appearance.  3. Mild stenosis of approximately 33% at the proximal aspect of the right   internal carotid artery with their is deposition of soft and calcified   plaque.  4. Deposition of soft and calcified plaque along the proximal aspect of   the left internal carotid artery, without significant stenosis.    CTA HEAD:  1. No large vessel occlusion.  2. Moderate short segment stenosis involving the M1 segment of the left   middle cerebral artery.  3. Short segment moderate stenosis involving the right P2 segment.  4. Right P1 segment is hypoplastic. There is relative fetal origin of the   right posterior cerebral artery.  5. Short segment moderate stenosis involving the midsegment of the   basilar artery.  6. No aneurysm identified. Tiny aneurysms can be beyond the resolution of  CTA technique.    < end of copied text >

## 2024-12-25 NOTE — H&P ADULT - PROBLEM SELECTOR PLAN 5
Has a history of hypertension, states that he takes a medication for this but does not remember which kind.    Plan:  - reach out to pharmacy in AM for medication reconciliation  - continue home antihypertensives

## 2024-12-25 NOTE — H&P ADULT - ATTENDING COMMENTS
I performed a history and physical exam of this patient and discussed the management with the resident listed here. I reviewed the note, agree with the documented findings and plan of care except as noted in my additions and changes below:    70yo w PMH of HTN presenting w/ left sided weakness and group level fall c/f acute ischemic stroke and rib fracture.     #CVA  #Left 10th rib fracture s/p fall  #HTN    CTA Head & Neck reviewed   Subtle decreased attenuation appreciated within a right pontine location  Proximal occlusion of the left vertebral artery with distal reconstitution at the level of C2-C3  Mild stenosis of approximately 33% at the proximal aspect of the right internal carotid artery  Moderate short segment stenosis involving the M1 segment of the left middle cerebral artery  Short segment moderate stenosis involving the right P2 segment  Short segment moderate stenosis involving the midsegment of the basilar artery    -NIHSS 4, LKW 12/23 2200   -Aspirin 81 and Plavix 300mg loading dose then ASA81/[id number] QD  -LDL A1c, TSH in AM  -Start Atorvastatin 80 mg QHS  -PT/OT evaluation  -TTE w/ bubble study  -MRI brain w/o contrast to look at the extent and distribution of the stroke   -Telemonitoring; Neurochecks and Vital signs Q4H  -Gradual normotension <130/80  -NPO until clears Dysphagia screen  -Aspiration, fall precautions  -Stroke education and counseling  -Neurology consulted. Appreciate Recs  -Pain control for rib fracture w/ lidocaine patch, Tylenol PRN  -DVT ppx

## 2024-12-25 NOTE — ED ADULT NURSE NOTE - CHIEF COMPLAINT QUOTE
last normal Dec 23  22:30 pt woke up with some left side weakness  08:30 am yesterday  c/o left side rib pain s/p fall today due to weakness  Not A CodE StrOkE as Per Dr Claudio

## 2024-12-25 NOTE — CONSULT NOTE ADULT - ASSESSMENT
Assessment:    Impression:    Recs:   Assessment:  68yo w PMH of HTN, comes in with left sided weakness which started  0600 when he woke up. LKW 2200. Weakness started with the leg and quickly progressed to arm, denies any face weakness, numbness, any speech or vision changes. Weakness worsened to a point where he had a fall while walking on , hurt his chest and head on left side. Currently denying any headache just mild chest pain. No hx of stroke, seizure, headache. No hx of smoking, occasional alcohol use, no ilicit drug use. Doesn't take any antithrombotics. Exam shows profound right arm and leg weakness, no numbness. CTH pending read    LKW 2200  MRS 0  NIHSS 4(CONRADO DEWITT)    Impression: Acute right arm and leg weakness localizes to left cerebral dysfunction. Etiology could be acute ischemic stroke. Mechanism likely .     Recs:  [] Aspirin 81 and Plavix 300mg loading dose then ASA81/Icvedh47 for 3 weeks followed by ASA 81 alone per [facility]ANCE trial  [] Atorvastatin 80, titrate to LDL<70  [] DVT prophylaxis    Imaging  [] f/u CTH, CTA read  [] MRI brain w/o contrast to look at the extent and distribution of the stroke   [] TTE without bubble study and telemetry to look for a cardiac source of embolism.  [] +/- ILR depending on TTE    Labs  [] HbA1C and Lipid Panel    Other  [] Telemonitoring;  Neurochecks and Vital signs q4h  [] Gradual normotension <130/80  [] BGM goals 140-180  [] PT/OT evaluation  [] NPO until clears Dysphagia screen, otherwise Swallow evaluation  [] Stroke education provided    Case discussed wiastrid stroke fellow Dr. Farley under the supervision of attending Dr. Libman   Assessment:  70yo w PMH of HTN, comes in with left sided weakness which started  0600 when he woke up. LKW 2200. Weakness started with the leg and quickly progressed to arm, denies any face weakness, numbness, any speech or vision changes. Weakness worsened to a point where he had a fall while walking on , hurt his chest and head on left side. Currently denying any headache just mild chest pain. No hx of stroke, seizure, headache. No hx of smoking, occasional alcohol use, no ilicit drug use. Doesn't take any antithrombotics. Exam shows profound left arm and leg weakness, no numbness. CTH pending read    LKW 2200  MRS 0  NIHSS 4(CONRADO DEWITT)    Impression: Acute left arm and leg weakness localizes to right cerebral dysfunction. Etiology could be acute ischemic stroke. Mechanism likely .     Recs:  [] Aspirin 81 and Plavix 300mg loading dose then ASA81/Btprtu83 for 3 weeks followed by ASA 81 alone per [facility]ANCE trial  [] Atorvastatin 80, titrate to LDL<70  [] DVT prophylaxis    Imaging  [] f/u CTH, CTA read  [] MRI brain w/o contrast to look at the extent and distribution of the stroke   [] TTE without bubble study and telemetry to look for a cardiac source of embolism.  [] +/- ILR depending on TTE    Labs  [] HbA1C and Lipid Panel    Other  [] Telemonitoring;  Neurochecks and Vital signs q4h  [] Gradual normotension <130/80  [] BGM goals 140-180  [] PT/OT evaluation  [] NPO until clears Dysphagia screen, otherwise Swallow evaluation  [] Stroke education provided    Case discussed wiastrid stroke fellow Dr. Farley under the supervision of attending Dr. Libman   Assessment:  68yo w PMH of HTN, comes in with left sided weakness which started  0600 when he woke up. LKW 2200. Weakness started with the leg and quickly progressed to arm, denies any face weakness, numbness, any speech or vision changes. Weakness worsened to a point where he had a fall while walking on , hurt his chest and head on left side. Currently denying any headache just mild chest pain. No hx of stroke, seizure, headache. No hx of smoking, occasional alcohol use, no ilicit drug use. Doesn't take any antithrombotics. Exam shows profound left arm and leg weakness, no numbness. CTH pending read    LKW 2200  MRS 0  NIHSS 4(CONRADO DEWITT)    Impression: Acute left arm and leg weakness localizes to right cerebral dysfunction. Etiology could be acute ischemic stroke. Mechanism likely .     Recs:  [] Aspirin 81 and Plavix 300mg loading dose then ASA81/Tplccl62 for 2 weeks as per [facility] trial  [] Atorvastatin 80, titrate to LDL<70  [] DVT prophylaxis    Imaging  [] MRI brain w/o contrast to look at the extent and distribution of the stroke   [] TTE without bubble study and telemetry to look for a cardiac source of embolism.  [] ILR depending on TTE (STROKE AF)    Labs  [] HbA1C and Lipid Panel    Other  [] Telemonitoring;  Neurochecks and Vital signs q4h  [] Gradual normotension <130/80  [] BGM goals 140-180  [] PT/OT evaluation  [] NPO until clears Dysphagia screen, otherwise Swallow evaluation  [] Stroke education provided    Case discussed wioth stroke fellow Dr. Farley under the supervision of attending Dr. Libman

## 2024-12-25 NOTE — H&P ADULT - PROBLEM SELECTOR PLAN 2
Acute weakness likely 2/2 right pontine stroke.    Plan:  - fall precautions  - PT/OT consulted  - stroke management as above

## 2024-12-25 NOTE — ED PROVIDER NOTE - INTERNATIONAL TRAVEL
Patient states she had foot surgery with Dr Tillman on Tuesday 7/16/19 and pain medication RX [place] they gave her is not helping with her pain, would like something stronger to be called in [facility]. Please advise     No

## 2024-12-25 NOTE — H&P ADULT - PROBLEM SELECTOR PLAN 1
Plan:    - aspiration precautions  - q4h neurochecks  - q4h vitals CT with    Plan:    - aspiration precautions  - q4h neurochecks  - q4h vitals Patient presenting with acute onset left sided weakness and a CT head with hypoattenuation in the right pontine location concerning for an acute ischemic stroke. Presented >24 hours from symptom onset. Patient without history of HLD, but has family history of stroke in 30s.  - s/p aspirin and plavix loading dose    Plan:  - neurology consulted, recs appreciated  - MRI brain w/o contrast for stroke evaluation  - TTE with bubble to evaluate for PFO  - aspirin 81 mg daily  - Plavix 75 mg daily  - atorvastatin 80 mg  - aspiration precautions  - q4h neurochecks  - q4h vitals  - Lipid panel in AM  - hgbA1c in AM

## 2024-12-25 NOTE — H&P ADULT - PROBLEM SELECTOR PLAN 6
Diet: NPO until bedside dysphagia screen  DVT prophylaxis: Lovenox  PT/OT consulted, recs appreciated  Code: full  Dispo: anticipate rehabilitation, pending workup

## 2024-12-25 NOTE — ED PROVIDER NOTE - OBJECTIVE STATEMENT
69M p/w L side weakness arm>leg x ~32 hours.  Pt went to bed at about 10-11pm 12/23 and woke up with L side weakness.  Today he was trying to walk and fell landing on L ribs, pt says due to the weakness he fell.  No arm or leg injury, no head injury.  Family report he had slight slurred speech this morning but it got better now.  Pt reports mild dysuria.  PMHX HTN on amlodipine and ?HCTZ.  Not on AC.  PMHX hernia repair, R forearm surgery.  No T.  NKDA.  VS wnl.  On exam mild distress L ribs pain, normal speech per family (english 2nd language).  L ribs ttp no deformity, tender L ribs.  No ecchymosis.  Some L CVAT also, Abd nontender.  L arm weakness 3/5, significantly decreased  strength.  LLE slight drift but distal flex ex of foot intact.  No sensory deficit.  Face symmetric, speech fluent.  Impression stroke L side weakness arm > leg.  Also L ribs ttp rule out fx.  Check CTA H/N eval for ICH or stroke.  Xray rib series.  check labs.  Likely admit for stroke workup - MRI, echo, tele, PT.  Pt will likely need rehab.    VS:  unremarkable    GEN - NAD;   well appearing;   A+O x3   HEAD - NC/AT     ENT - PEERL, EOMI, mucous membranes    dry , no discharge      NECK: Neck supple, non-tender without lymphadenopathy, no masses, no JVD  PULM - CTA b/l,  symmetric breath sounds.  L lower lateral ribs ttp w/o deformity or ecchymosis  COR -  normal heart sounds    ABD - , ND, NT, soft,  BACK -(+)L CVA tenderness, nontender spine     EXTREMS - no edema, no deformity, warm and well perfused    SKIN - no rash    or bruising      NEUROLOGIC - alert, face symmetric, speech fluent, sensation nl, motor L side weakness arm>>leg.

## 2024-12-25 NOTE — H&P ADULT - ASSESSMENT
Rigo Da Silva is a 69-year-old male with a past medical history of HTN presenting with a one day history of left sided weakness and a ground level fall concerning for an acute ischemic stroke and found to have a rib fracture. Code stroke was called and neurology consulted.

## 2024-12-25 NOTE — ED ADULT TRIAGE NOTE - CHIEF COMPLAINT QUOTE
last normal Dec 23  22:30 pt woke up with some left side weakness  08:30 am yesterday  c/o left side rib pain s/p fall today due to weakness last normal Dec 23  22:30 pt woke up with some left side weakness  08:30 am yesterday  c/o left side rib pain s/p fall today due to weakness  Not A CodE StrOkE as Per Dr Claudio

## 2024-12-25 NOTE — ED PROVIDER NOTE - PHYSICAL EXAMINATION
VS:  unremarkable    GEN - NAD;   well appearing;   A+O x3   HEAD - NC/AT     ENT - PEERL, EOMI, mucous membranes    dry , no discharge      NECK: Neck supple, non-tender without lymphadenopathy, no masses, no JVD  PULM - CTA b/l,  symmetric breath sounds.  L lower lateral ribs ttp w/o deformity or ecchymosis  COR -  normal heart sounds    ABD - , ND, NT, soft,  BACK -(+)L CVA tenderness, nontender spine     EXTREMS - no edema, no deformity, warm and well perfused    SKIN - no rash    or bruising      NEUROLOGIC - alert, face symmetric, speech fluent, sensation nl, motor L side weakness arm>>leg.

## 2024-12-25 NOTE — ED PROVIDER NOTE - PROGRESS NOTE DETAILS
Patient signed out to me Caitie Taylor PGY1. CT angio head and neck showing possible right pontine ischemic event, L vertebral artery proximal occlusion, as well as multiple areas of stenosis including the mid basilar artery, L M1, and R P2. Plan to admit patient for further workup, PT/OT eval.

## 2024-12-26 ENCOUNTER — RESULT REVIEW (OUTPATIENT)
Age: 69
End: 2024-12-26

## 2024-12-26 LAB
A1C WITH ESTIMATED AVERAGE GLUCOSE RESULT: 5.3 % — SIGNIFICANT CHANGE UP (ref 4–5.6)
APTT BLD: 35.8 SEC — HIGH (ref 24.5–35.6)
BASOPHILS # BLD AUTO: 0.02 K/UL — SIGNIFICANT CHANGE UP (ref 0–0.2)
BASOPHILS NFR BLD AUTO: 0.2 % — SIGNIFICANT CHANGE UP (ref 0–2)
CHOLEST SERPL-MCNC: 193 MG/DL — SIGNIFICANT CHANGE UP
EOSINOPHIL # BLD AUTO: 0.02 K/UL — SIGNIFICANT CHANGE UP (ref 0–0.5)
EOSINOPHIL NFR BLD AUTO: 0.2 % — SIGNIFICANT CHANGE UP (ref 0–6)
ESTIMATED AVERAGE GLUCOSE: 105 — SIGNIFICANT CHANGE UP
HCT VFR BLD CALC: 41.4 % — SIGNIFICANT CHANGE UP (ref 39–50)
HDLC SERPL-MCNC: 48 MG/DL — SIGNIFICANT CHANGE UP
HGB BLD-MCNC: 14.5 G/DL — SIGNIFICANT CHANGE UP (ref 13–17)
IANC: 6.22 K/UL — SIGNIFICANT CHANGE UP (ref 1.8–7.4)
IMM GRANULOCYTES NFR BLD AUTO: 0.2 % — SIGNIFICANT CHANGE UP (ref 0–0.9)
INR BLD: 1.04 RATIO — SIGNIFICANT CHANGE UP (ref 0.85–1.16)
LIPID PNL WITH DIRECT LDL SERPL: 127 MG/DL — HIGH
LYMPHOCYTES # BLD AUTO: 2.19 K/UL — SIGNIFICANT CHANGE UP (ref 1–3.3)
LYMPHOCYTES # BLD AUTO: 23.5 % — SIGNIFICANT CHANGE UP (ref 13–44)
MAGNESIUM SERPL-MCNC: 1.9 MG/DL — SIGNIFICANT CHANGE UP (ref 1.6–2.6)
MCHC RBC-ENTMCNC: 29.5 PG — SIGNIFICANT CHANGE UP (ref 27–34)
MCHC RBC-ENTMCNC: 35 G/DL — SIGNIFICANT CHANGE UP (ref 32–36)
MCV RBC AUTO: 84.3 FL — SIGNIFICANT CHANGE UP (ref 80–100)
MONOCYTES # BLD AUTO: 0.85 K/UL — SIGNIFICANT CHANGE UP (ref 0–0.9)
MONOCYTES NFR BLD AUTO: 9.1 % — SIGNIFICANT CHANGE UP (ref 2–14)
NEUTROPHILS # BLD AUTO: 6.22 K/UL — SIGNIFICANT CHANGE UP (ref 1.8–7.4)
NEUTROPHILS NFR BLD AUTO: 66.8 % — SIGNIFICANT CHANGE UP (ref 43–77)
NON HDL CHOLESTEROL: 145 MG/DL — HIGH
NRBC # BLD: 0 /100 WBCS — SIGNIFICANT CHANGE UP (ref 0–0)
NRBC # FLD: 0 K/UL — SIGNIFICANT CHANGE UP (ref 0–0)
PHOSPHATE SERPL-MCNC: 3.5 MG/DL — SIGNIFICANT CHANGE UP (ref 2.5–4.5)
PLATELET # BLD AUTO: 280 K/UL — SIGNIFICANT CHANGE UP (ref 150–400)
PROTHROM AB SERPL-ACNC: 12.4 SEC — SIGNIFICANT CHANGE UP (ref 9.9–13.4)
RBC # BLD: 4.91 M/UL — SIGNIFICANT CHANGE UP (ref 4.2–5.8)
RBC # FLD: 11.6 % — SIGNIFICANT CHANGE UP (ref 10.3–14.5)
T4 FREE+ TSH PNL SERPL: 1.06 UIU/ML — SIGNIFICANT CHANGE UP (ref 0.27–4.2)
TRIGL SERPL-MCNC: 100 MG/DL — SIGNIFICANT CHANGE UP
WBC # BLD: 9.32 K/UL — SIGNIFICANT CHANGE UP (ref 3.8–10.5)
WBC # FLD AUTO: 9.32 K/UL — SIGNIFICANT CHANGE UP (ref 3.8–10.5)

## 2024-12-26 PROCEDURE — 99233 SBSQ HOSP IP/OBS HIGH 50: CPT

## 2024-12-26 PROCEDURE — 99222 1ST HOSP IP/OBS MODERATE 55: CPT

## 2024-12-26 PROCEDURE — 93306 TTE W/DOPPLER COMPLETE: CPT | Mod: 26

## 2024-12-26 RX ORDER — IBUPROFEN 200 MG
600 TABLET ORAL EVERY 8 HOURS
Refills: 0 | Status: DISCONTINUED | OUTPATIENT
Start: 2024-12-26 | End: 2024-12-27

## 2024-12-26 RX ORDER — LIDOCAINE 50 MG/G
1 OINTMENT TOPICAL DAILY
Refills: 0 | Status: DISCONTINUED | OUTPATIENT
Start: 2024-12-26 | End: 2024-12-27

## 2024-12-26 RX ORDER — ACETAMINOPHEN 80 MG/.8ML
975 SOLUTION/ DROPS ORAL EVERY 8 HOURS
Refills: 0 | Status: DISCONTINUED | OUTPATIENT
Start: 2024-12-26 | End: 2024-12-27

## 2024-12-26 RX ADMIN — ATORVASTATIN CALCIUM 80 MILLIGRAM(S): 40 TABLET, FILM COATED ORAL at 22:59

## 2024-12-26 RX ADMIN — LIDOCAINE 1 PATCH: 50 OINTMENT TOPICAL at 00:34

## 2024-12-26 RX ADMIN — ACETAMINOPHEN 975 MILLIGRAM(S): 80 SOLUTION/ DROPS ORAL at 15:01

## 2024-12-26 RX ADMIN — ACETAMINOPHEN 975 MILLIGRAM(S): 80 SOLUTION/ DROPS ORAL at 23:08

## 2024-12-26 RX ADMIN — ATORVASTATIN CALCIUM 80 MILLIGRAM(S): 40 TABLET, FILM COATED ORAL at 00:13

## 2024-12-26 RX ADMIN — CLOPIDOGREL BISULFATE 75 MILLIGRAM(S): 75 TABLET, FILM COATED ORAL at 08:47

## 2024-12-26 RX ADMIN — Medication 81 MILLIGRAM(S): at 08:47

## 2024-12-26 NOTE — OCCUPATIONAL THERAPY INITIAL EVALUATION ADULT - DIAGNOSIS, OT EVAL
s/p left weakness, s/p possible right pontine CVA; decreased functional mobility, decreased ADL performance, left UE/LE weakness, decreased functional use of left UE

## 2024-12-26 NOTE — PROGRESS NOTE ADULT - PROBLEM SELECTOR PLAN 3
Feel from ground level attributed to weakness. Less concern for cardiac etiology or syncope without lightheadedness, palpitations, loss of consciousness.  - telemetry monitoring  - Fall precautions  - PT/OT --> Acute Rehab  - PM&R: anticipate acute inpatient rehab when medically cleared

## 2024-12-26 NOTE — PROGRESS NOTE ADULT - PROBLEM SELECTOR PLAN 2
Acute weakness likely 2/2 right pontine stroke  - Fall precautions  - PT/OT --> Acute Rehab  - PM&R: anticipate acute inpatient rehab when medically cleared  - Stroke management as above

## 2024-12-26 NOTE — PROGRESS NOTE ADULT - PROBLEM SELECTOR PLAN 1
Patient presenting with acute onset left sided weakness and a CT head with hypoattenuation in the right pontine location concerning for an acute ischemic stroke. Presented >24 hours from symptom onset. Patient without history of HLD, but has family history of stroke in 30s  - s/p aspirin and plavix loading dose  - Neurology consulted, recs appreciated  - MRI brain w/o contrast for stroke evaluation  - TTE: EF 74%. Mild LVH. Increased LV mass/concentric hypertrophy. Mild (grade 1) LV diastolic dysfunction. Trace MR. Mild AI. Saline injection reveals bubbles in the L heart, but is inconclusive for presence of an intracardial shunt  - C/w aspirin 81 mg daily/Plavix 75 mg daily/atorvastatin 80 mg  - Aspiration precautions, S&S eval -->Continue Regular Solids with Thin Liquids  - q4h neurochecks, telemetry monitoring  - , A1C 5.3, TSH WNL

## 2024-12-26 NOTE — PHYSICAL THERAPY INITIAL EVALUATION ADULT - ADDITIONAL COMMENTS
Pt states he lives alone in a house with 1 step to enter and has 1 flight to the second floor where he resides. Prior to admission, pt was ambulating independently without any assistive devices.   Post PT evaluation, pt left with head of bed elevated to 30 degrees, alarm on, call bell and remote within reach, all precautions maintained, NAD. RN aware.

## 2024-12-26 NOTE — PROGRESS NOTE ADULT - PROBLEM SELECTOR PLAN 6
DVT prophylaxis: Lovenox  PT/OT/PM&R: anticipate acute inpatient rehab when medically cleared    Discussed with Ex-Wife Tegan at bedside at length on 12/26.

## 2024-12-26 NOTE — PROGRESS NOTE ADULT - PROBLEM SELECTOR PLAN 4
CXR/Rib X-Ray with minimally displaced fracture seen at the angle of the left 10th rib  - pain control: lidocaine patches, acetaminophen ATC and ibuprofen prn  - Saturating well on RA

## 2024-12-26 NOTE — SWALLOW BEDSIDE ASSESSMENT ADULT - H & P REVIEW
H&P 12/25, "Rigo Da Silva is a 69-year-old male with a past medical history of HTN presenting with a one day history of left sided weakness and a ground level fall concerning for an acute ischemic stroke and found to have a rib fracture. Code stroke was called and neurology consulted."/yes

## 2024-12-26 NOTE — OCCUPATIONAL THERAPY INITIAL EVALUATION ADULT - PERTINENT HX OF CURRENT PROBLEM, REHAB EVAL
69-year-old male with a past medical history of HTN presenting with a one day history of left sided weakness and a ground level fall. CT/A H/N revealing possible right pontine CVA and left vertebral artery occlusion. Also found to have left 10th rib fracture.

## 2024-12-26 NOTE — DIETITIAN INITIAL EVALUATION ADULT - OTHER INFO
Pt 70 yo male with PMHx of HTN presented with left sided weakness and a ground level fall concerning for an acute ischemic stroke and found to have a rib fracture.      At time of visit, Pt alert, but somewhat sleepy; Pt participated in the interview. Per Pt, his appetite good. No report of chewing or swallowing difficulty; no report of nausea, vomiting or diarrhea @ this time. +Last /25) per flow sheet. Of note, Pt passed Bedside Swallow Evaluation on 12/26/24; SLP rec: to continue Regular Solids with Thin Liquids.     Per Pt, his height: 65" & his body weight: 147# -> not consistent with admission weight: 154#/69.6 kg (12/25) --> question accuracy? Of note, Pt's diet rx includes DASH/TLC (cholesterol and sodium restricted) restriction. No food related concern voiced at present. RD remains available, Pt made aware.

## 2024-12-26 NOTE — SWALLOW BEDSIDE ASSESSMENT ADULT - PHARYNGEAL PHASE
Within functional limits Admission Reconciliation is Completed  Discharge Reconciliation is Not Complete Admission Reconciliation is Completed  Discharge Reconciliation is Completed

## 2024-12-26 NOTE — SWALLOW BEDSIDE ASSESSMENT ADULT - SLP PERTINENT HISTORY OF CURRENT PROBLEM
Neuro , "68yo w PMH of HTN, comes in with left sided weakness which started  0600 when he woke up. LKW 2200. Weakness started with the leg and quickly progressed to arm, denies any face weakness, numbness, any speech or vision changes. Weakness worsened to a point where he had a fall while walking on , hurt his chest and head on left side. Currently denying any headache just mild chest pain. No hx of stroke, seizure, headache. No hx of smoking, occasional alcohol use, no ilicit drug use. Doesn't take any antithrombotics. Exam shows profound left arm and leg weakness, no numbness. CTH pending read [facility] 2200 MRS 0 NIHSS 4(CONRADO DEWITT) Impression: Acute left arm and leg weakness localizes to right cerebral dysfunction. Etiology could be acute ischemic stroke. Mechanism likely ."

## 2024-12-26 NOTE — SWALLOW BEDSIDE ASSESSMENT ADULT - SWALLOW EVAL: DIAGNOSIS
1. Functional oral stage for regular solids and thin liquids characterized by adequate acceptance and containment, adequate mastication of regular solids, adequate anterior to posterior transport and adequate oral clearance. 2. Functional pharyngeal stage suspected for the aforementioned consistencies characterized by initiation of the pharyngeal swallow and hyolaryngeal excursion upon digital palpation with no overt s/s penetration/ aspiration noted.

## 2024-12-26 NOTE — OCCUPATIONAL THERAPY INITIAL EVALUATION ADULT - GENERAL OBSERVATIONS, REHAB EVAL
Patient received semisupine in bed in NAD; agreeable to participate in OT evaluation. +telemetry monitor. HR 80 bpm. +left UE/LE weakness

## 2024-12-26 NOTE — PHYSICAL THERAPY INITIAL EVALUATION ADULT - PERTINENT HX OF CURRENT PROBLEM, REHAB EVAL
69-year-old male with a past medical history of HTN presenting with a one day history of left sided weakness and a ground level fall concerning for an acute ischemic stroke and found to have a rib fracture. Code stroke was called and neurology consulted.

## 2024-12-26 NOTE — DIETITIAN INITIAL EVALUATION ADULT - PERTINENT LABORATORY DATA
14.5   9.32  )-----------( 280      ( 26 Dec 2024 07:06 )             41.4   12-25  136  |  96[L]  |  13  ----------------------------<  109[H]  3.7   |  24  |  1.03  Ca    9.9      25 Dec 2024 17:00  Phos  3.5     12-26  Mg     1.90     12-26  TPro  8.6[H]  /  Alb  4.2  /  TBili  0.5  /  DBili  x   /  AST  20  /  ALT  17  /  AlkPhos  65  12-25  A1C with Estimated Average Glucose Result: 5.3 % (12-26-24 @ 07:06)  CAPILLARY BLOOD GLUCOSE  POCT Blood Glucose.: 170 mg/dL (25 Dec 2024 15:20)

## 2024-12-26 NOTE — OCCUPATIONAL THERAPY INITIAL EVALUATION ADULT - TRANSFER SAFETY CONCERNS NOTED: SIT/STAND, REHAB EVAL
"The ABCs of the Annual Wellness Visit  Subsequent Medicare Wellness Visit    Chief Complaint   Patient presents with   • Annual Exam       Subjective   History of Present Illness:  Abe Phillips Jr. is a 67 y.o. male who presents for a Subsequent Medicare Wellness Visit.    HEALTH RISK ASSESSMENT    Recent Hospitalizations:  No hospitalization(s) within the last year.    Current Medical Providers:  Patient Care Team:  Anibal Maria MD as PCP - General (Internal Medicine)  Dustin Hendricks MD as Consulting Physician (Endocrinology)    Smoking Status:  Social History     Tobacco Use   Smoking Status Never Smoker   Smokeless Tobacco Never Used       Alcohol Consumption:  Social History     Substance and Sexual Activity   Alcohol Use Yes    Comment: \"3 SCOTCH AND 2 BEERS PER WEEK\"       Depression Screen:   PHQ-2/PHQ-9 Depression Screening 12/14/2020   Little interest or pleasure in doing things 0   Feeling down, depressed, or hopeless 0   Trouble falling or staying asleep, or sleeping too much 0   Feeling tired or having little energy 1   Poor appetite or overeating 1   Feeling bad about yourself - or that you are a failure or have let yourself or your family down 0   Trouble concentrating on things, such as reading the newspaper or watching television 0   Moving or speaking so slowly that other people could have noticed. Or the opposite - being so fidgety or restless that you have been moving around a lot more than usual 1   Thoughts that you would be better off dead, or of hurting yourself in some way 0   Total Score 3   If you checked off any problems, how difficult have these problems made it for you to do your work, take care of things at home, or get along with other people? Not difficult at all       Fall Risk Screen:  STEADI Fall Risk Assessment was completed, and patient is at LOW risk for falls.Assessment completed on:12/14/2020    Health Habits and Functional and Cognitive Screening:  Functional & " Cognitive Status 12/14/2020   Do you have difficulty preparing food and eating? No   Do you have difficulty bathing yourself, getting dressed or grooming yourself? No   Do you have difficulty using the toilet? No   Do you have difficulty moving around from place to place? No   Do you have trouble with steps or getting out of a bed or a chair? Yes   Current Diet Well Balanced Diet   Dental Exam Not up to date   Eye Exam Not up to date   Exercise (times per week) 0 times per week   Do you need help using the phone?  No   Are you deaf or do you have serious difficulty hearing?  No   Do you need help with transportation? No   Do you need help shopping? No   Do you need help preparing meals?  No   Do you need help with housework?  No   Do you need help with laundry? No   Do you need help taking your medications? No   Do you need help managing money? No   Do you ever drive or ride in a car without wearing a seat belt? No   Have you felt unusual stress, anger or loneliness in the last month? No   Who do you live with? Spouse   If you need help, do you have trouble finding someone available to you? No   Have you been bothered in the last four weeks by sexual problems? Yes   Do you have difficulty concentrating, remembering or making decisions? No         Does the patient have evidence of cognitive impairment? No    Asprin use counseling:Taking ASA appropriately as indicated    Age-appropriate Screening Schedule:  Refer to the list below for future screening recommendations based on patient's age, sex and/or medical conditions. Orders for these recommended tests are listed in the plan section. The patient has been provided with a written plan.    Health Maintenance   Topic Date Due   • COLONOSCOPY  1953   • TDAP/TD VACCINES (1 - Tdap) 07/28/1973   • ZOSTER VACCINE (1 of 2) 07/28/2004   • DIABETIC FOOT EXAM  01/07/2019   • URINE MICROALBUMIN  01/30/2020   • INFLUENZA VACCINE  08/01/2020   • LIPID PANEL  02/13/2021   •  HEMOGLOBIN A1C  05/19/2021   • DIABETIC EYE EXAM  08/03/2021            Fall Risk Assessment was completed, and patient is at low risk for falls.      The following portions of the patient's history were reviewed and updated as appropriate: current medications, past family history, past medical history, past social history, past surgical history and problem list.    Outpatient Medications Prior to Visit   Medication Sig Dispense Refill   • amphetamine-dextroamphetamine XR (ADDERALL XR) 30 MG 24 hr capsule Take 2 capsules by mouth Every Morning 60 capsule 0   • aspirin 81 MG EC tablet TAKE 1 TABLET BY MOUTH EVERY DAY 90 tablet 1   • bumetanide (BUMEX) 2 MG tablet Take 2 tablets by mouth 2 (Two) Times a Day. 120 tablet 5   • diclofenac (VOLTAREN) 1 % gel gel Apply 4 g topically to the appropriate area as directed 2 (Two) Times a Day As Needed (joint pain). 100 g 1   • Docusate Calcium (STOOL SOFTENER PO) Take 1 tablet by mouth As Needed.     • gabapentin (NEURONTIN) 300 MG capsule Take 3 capsules by mouth 2 (Two) Times a Day. 180 capsule 2   • insulin regular (HUMULIN R) 500 UNIT/ML CONCENTRATED injection As directed in insulin pump mas daily dose 40 unit     • levocetirizine (XYZAL) 5 MG tablet Take 1 tablet by mouth Every Evening. 90 tablet 1   • levothyroxine (SYNTHROID, LEVOTHROID) 75 MCG tablet TAKE ONE TABLET BY MOUTH DAILY 90 tablet 3   • metOLazone (ZAROXOLYN) 5 MG tablet Take 1 tablet by mouth Daily As Needed (edema/fluid overload). 90 tablet 1   • mupirocin (BACTROBAN) 2 % ointment Apply  topically to the appropriate area as directed 3 (Three) Times a Day. 30 g 0   • nystatin (MYCOSTATIN) 961654 UNIT/GM ointment      • potassium chloride (K-DUR) 10 MEQ CR tablet Take 3 tablets by mouth 2 (Two) Times a Day. 540 tablet 3   • pramipexole (MIRAPEX) 1 MG tablet TAKE ONE TABLET BY MOUTH TWICE A  tablet 1   • pravastatin (PRAVACHOL) 40 MG tablet Take 1 tablet by mouth Every Night. 90 tablet 3   • psyllium  "(METAMUCIL) 58.6 % packet Take 1 packet by mouth Daily As Needed.     • spironolactone (ALDACTONE) 100 MG tablet Take 0.5 tablets by mouth Daily. 30 tablet 6   • Testosterone Cypionate (DEPOTESTOTERONE CYPIONATE) 200 MG/ML injection Inject 1 mL into the appropriate muscle as directed by prescriber Every 14 (Fourteen) Days. 10 mL 1   • traZODone (DESYREL) 50 MG tablet Take 1 tablet by mouth At Night As Needed for Sleep. 90 tablet 2   • venlafaxine XR (EFFEXOR-XR) 150 MG 24 hr capsule Take 1 capsule by mouth Every Night. 90 capsule 2   • Alcohol Swabs (ALCOHOL PADS) 70 % pads 1 swab 3 (Three) Times a Day. 100 each 0   • Blood Glucose Monitoring Suppl (Contour Next Monitor) w/Device kit 1 kit Every 3 (Three) Months. Use to check bg 1 kit 0   • Continuous Blood Gluc  (Dexcom G6 ) device 1 kit Every 3 (Three) Months. For checking bg gets one per year 1 Device 0   • Continuous Blood Gluc Sensor (Dexcom G6 Sensor) Every 10 (Ten) Days. 3 each 11   • Continuous Blood Gluc Transmit (Dexcom G6 Transmitter) misc 1 kit Every 3 (Three) Months. Use to check blood sugar 1 each 3   • fluconazole (Diflucan) 100 MG tablet Take 1 tablet by mouth Daily. 7 tablet 0   • glucose blood test strip Contour next test strips  For checking bg tid 100 each 5   • Insulin Disposable Pump (OmniPod Dash 5 Pack Pods) misc CHANGE POD EVERY 3 DAYS     • Insulin Syringe 29G X 1/2\" 1 ML misc Use one each to inject insulin four times daily Ell.9 500 each 1   • Microlet Lancets misc For checking bg tid 100 each 3   • Needle, Disp, (BD Disp Needle) 23G X 1\" misc Use to inject testosterone every 2 weeks 6 each 1   • Syringe/Needle, Disp, (B-D 3CC LUER-AGNES SYR 48QV0-0/2) 18G X 1-1/2\" 3 ML misc Use to draw up testosterone every 2 weeks 6 each 1     No facility-administered medications prior to visit.        Patient Active Problem List   Diagnosis   • Type 2 diabetes mellitus with hyperglycemia (CMS/HCC)   • Elevated LFTs   • Thrombocytopenia " (CMS/HCC)   • PVD (peripheral vascular disease) (CMS/HCC)   • A-fib (CMS/HCC)   • Cirrhosis of liver (CMS/HCC)   • Chronic congestive heart failure (CMS/HCC)   • Diabetic ulcer of right heel (CMS/HCC)   • Primary narcolepsy without cataplexy   • Essential hypertension   • Morbidly obese (CMS/HCC)   • Restless legs syndrome (RLS)   • ADD (attention deficit disorder)   • Depression   • Diabetes mellitus type 2, uncontrolled, with complications (CMS/HCC)   • Edema   • MCCAULEY (nonalcoholic steatohepatitis)   • Hepatitis B   • Insomnia   • Lymphedema   • Obesity   • Severe JAIME on CPAP   • RLS (restless legs syndrome)   • Tremor of both hands   • Restrictive cardiomyopathy (CMS/HCC)   • Other hyperlipidemia   • Uncontrolled type 2 diabetes mellitus with hyperglycemia (CMS/HCC)   • OMAR (acute kidney injury) (CMS/HCC)   • Dyslipidemia   • Hyperglycemia   • Diabetic ulcer of right heel associated with type 2 diabetes mellitus (CMS/HCC)   • Facial paralysis/[place] palsy   • Diabetic hypoglycemia (CMS/HCC)   • Hypogonadism in male       Advanced Care Planning:  ACP discussion was held with the patient during this visit. Patient does not have an advance directive, information provided.    Review of Systems   Constitutional: Negative for chills and fever.   HENT: Negative for congestion, ear pain, hearing loss, rhinorrhea, sinus pressure, sore throat and trouble swallowing.    Eyes: Negative for discharge and itching.   Respiratory: Positive for shortness of breath (better). Negative for cough and chest tightness.    Cardiovascular: Positive for leg swelling (better). Negative for chest pain and palpitations.   Gastrointestinal: Negative for abdominal pain, blood in stool, constipation, diarrhea and vomiting.        Colonoscopy 3/16   Endocrine: Positive for cold intolerance. Negative for polydipsia and polyuria.   Genitourinary: Negative for difficulty urinating, dysuria, enuresis, frequency, hematuria and urgency.  "  Musculoskeletal: Positive for arthralgias, back pain and gait problem. Negative for joint swelling.   Skin: Negative for rash.   Allergic/Immunologic: Negative for immunocompromised state.   Neurological: Positive for weakness and light-headedness. Negative for dizziness, syncope, numbness and headaches.   Hematological: Does not bruise/bleed easily.   Psychiatric/Behavioral: Negative for behavioral problems, dysphoric mood and sleep disturbance. The patient is not nervous/anxious.        Compared to one year ago, the patient feels his physical health is worse.  Compared to one year ago, the patient feels his mental health is better.    Reviewed chart for potential of high risk medication in the elderly: yes  Reviewed chart for potential of harmful drug interactions in the elderly:yes    Objective         Vitals:    12/14/20 1202 12/14/20 1217   BP: 142/80 140/74   BP Location: Left arm    Patient Position: Sitting    Pulse: 76    Temp: 96.9 °F (36.1 °C)    TempSrc: Infrared    Weight: (!) 144 kg (318 lb)    Height: 193 cm (75.98\")    PainSc:   6        Body mass index is 38.72 kg/m².  Discussed the patient's BMI with him. The BMI is above average; BMI management plan is completed.    Physical Exam  Constitutional:       Appearance: Normal appearance. He is well-developed.   HENT:      Head: Normocephalic and atraumatic.      Right Ear: External ear normal.      Left Ear: External ear normal.      Nose: Nose normal.      Mouth/Throat:      Mouth: Mucous membranes are moist.      Pharynx: Oropharynx is clear.   Eyes:      Extraocular Movements: Extraocular movements intact.      Conjunctiva/sclera: Conjunctivae normal.      Pupils: Pupils are equal, round, and reactive to light.   Neck:      Musculoskeletal: Normal range of motion and neck supple.   Cardiovascular:      Rate and Rhythm: Normal rate and regular rhythm.      Heart sounds: Normal heart sounds.   Pulmonary:      Effort: Pulmonary effort is normal.      " Breath sounds: Normal breath sounds.   Abdominal:      General: Bowel sounds are normal.      Palpations: Abdomen is soft.   Musculoskeletal: Normal range of motion.      Right lower leg: Edema present.      Left lower leg: Edema present.      Comments: Has B compression hose   Lymphadenopathy:      Cervical: No cervical adenopathy.   Skin:     General: Skin is warm and dry.   Neurological:      General: No focal deficit present.      Mental Status: He is alert and oriented to person, place, and time.   Psychiatric:         Mood and Affect: Mood normal.         Behavior: Behavior normal.         Thought Content: Thought content normal.         Lab Results   Component Value Date    HGBA1C 8.3 11/19/2020        Assessment/Plan   Medicare Risks and Personalized Health Plan  CMS Preventative Services Quick Reference  Advance Directive Discussion  Chronic Pain   Colon Cancer Screening  Immunizations Discussed/Encouraged (specific immunizations; Hepatitis A Vaccine/Series and Pneumococcal 23 )  Inactivity/Sedentary  Obesity/Overweight   Polypharmacy  Prostate Cancer Screening     The above risks/problems have been discussed with the patient.  Pertinent information has been shared with the patient in the After Visit Summary.  Follow up plans and orders are seen below in the Assessment/Plan Section.    Diagnoses and all orders for this visit:    1. Essential hypertension (Primary)    2. Chronic diastolic congestive heart failure (CMS/HCC)    3. MCCAULEY (nonalcoholic steatohepatitis)  -     Comprehensive Metabolic Panel    4. Uncontrolled type 2 diabetes mellitus with hyperglycemia (CMS/HCC)    5. Hypogonadism in male    6. Thrombocytopenia (CMS/HCC)  -     CBC (No Diff)    7. Dyslipidemia  -     TSH    8. Localized edema    9. Other depression    10. Screening for prostate cancer  -     PSA Screen      Follow Up:  No follow-ups on file.     An After Visit Summary and PPPS were given to the patient.     DM-f/u ENDO, counseled  "on low carb and goals of FSBS, improving  Chronic LE edema-advised to weigh daily, controlled on triple diuretic and compression hose  CHF-\" \"  Hep B hx/FLD-f/u Dr Murcia, advised wt loss  Neuropathy-on Gabapentin, stable  Narcolepsy-on Adderall, stable  JAIME-cont CPAP, stable  Hx of Afib-no recurrence s/p Ablation  Hypothyroid-TSH today  Depression-stable on Effexor  RLS-controlled on Mirapex  Elevated Cr-stable advised to ensure adequate hydration, recheck today, improved  Hyperlipidemia-cont statin, labs 2/13 dw patient, recheck on rtc  thrombocytopenia-recheck normal     12/14 Labs noted and dw patient        " losing balance/decreased weight-shifting ability

## 2024-12-26 NOTE — CONSULT NOTE ADULT - SUBJECTIVE AND OBJECTIVE BOX
Patient is a 69y old  Male who presents with a chief complaint of Stroke (25 Dec 2024 22:31)      HPI:  Rigo Da Silva is a 69-year-old male with a past medical history of HTN presenting with a one day history of left sided weakness and a ground level fall.    Patient notes that he was well on the night of 12/23. When he woke up on 12/24, he noticed the left side of his body was weaker. He did not notice vision or voice changes. No headache or pain elsewhere. This weakness continued through the day and night. On 12/25, he was walking along flat ground and then fell. He attributes this fall to weakness. He did not experience head strike or loss of consciousness. He did not experience lightheadedness, vision changes, chest pain, or palpitations before the fall. He called EMS and was brought to the hospital.    Denies fevers/chills, lightheadedness, palpitations, chest pain, shortness of breath, abdominal pain, vision changes, nausea/vomiting, diarrhea/constipation, sick contacts, recent travel.    ED course: VS on presentation: T 98.5, HR 96, /85, RR 20, 95% RA. Code stroke was called. 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 CXR with minimally displaced fracture at the angle of the left 10th rib. Given aspirin, clopidogrel 300 mg, acetaminophen. (25 Dec 2024 22:31)      REVIEW OF SYSTEMS  Constitutional - No fever, No weight loss, No fatigue  HEENT - No eye pain, No visual disturbances, No difficulty hearing, No tinnitus, No vertigo, No neck pain  Respiratory - No cough, No wheezing, No shortness of breath  Cardiovascular - No chest pain, No palpitations  Gastrointestinal - No abdominal pain, No nausea, No vomiting, No diarrhea, No constipation  Genitourinary - No dysuria, No frequency, No hematuria, No incontinence  Neurological - No headaches, No memory loss, No loss of strength, No numbness, No tremors  Skin - No itching, No rashes, No lesions   Endocrine - No temperature intolerance  Musculoskeletal - No joint pain, No joint swelling, No muscle pain  Psychiatric - No depression, No anxiety    PAST MEDICAL & SURGICAL HISTORY  HTN (hypertension)    Unilateral inguinal hernia, without obstruction or gangrene, not specified as recurrent    H/O hernia repair    History of surgery on arm        SOCIAL HISTORY  Smoking - Denied  EtOH - Denied   Drugs - Denied    FUNCTIONAL HISTORY  Lives   Independent    CURRENT FUNCTIONAL STATUS      FAMILY HISTORY   Known health problems: none (Mother)    FH: stroke (Child)        RECENT LABS/IMAGING  CBC Full  -  ( 26 Dec 2024 07:06 )  WBC Count : 9.32 K/uL  RBC Count : 4.91 M/uL  Hemoglobin : 14.5 g/dL  Hematocrit : 41.4 %  Platelet Count - Automated : 280 K/uL  Mean Cell Volume : 84.3 fL  Mean Cell Hemoglobin : 29.5 pg  Mean Cell Hemoglobin Concentration : 35.0 g/dL  Auto Neutrophil # : 6.22 K/uL  Auto Lymphocyte # : 2.19 K/uL  Auto Monocyte # : 0.85 K/uL  Auto Eosinophil # : 0.02 K/uL  Auto Basophil # : 0.02 K/uL  Auto Neutrophil % : 66.8 %  Auto Lymphocyte % : 23.5 %  Auto Monocyte % : 9.1 %  Auto Eosinophil % : 0.2 %  Auto Basophil % : 0.2 %    12-25    136  |  96[L]  |  13  ----------------------------<  109[H]  3.7   |  24  |  1.03    Ca    9.9      25 Dec 2024 17:00  Phos  3.5     12-26  Mg     1.90     12-26    TPro  8.6[H]  /  Alb  4.2  /  TBili  0.5  /  DBili  x   /  AST  20  /  ALT  17  /  AlkPhos  65  12-25    Urinalysis Basic - ( 25 Dec 2024 17:00 )    Color: x / Appearance: x / SG: x / pH: x  Gluc: 109 mg/dL / Ketone: x  / Bili: x / Urobili: x   Blood: x / Protein: x / Nitrite: x   Leuk Esterase: x / RBC: x / WBC x   Sq Epi: x / Non Sq Epi: x / Bacteria: x        VITALS  T(C): 36.9 (12-26-24 @ 11:15), Max: 37.1 (12-26-24 @ 06:24)  HR: 80 (12-26-24 @ 11:15) (69 - 96)  BP: 145/85 (12-26-24 @ 11:15) (137/67 - 159/81)  RR: 18 (12-26-24 @ 11:15) (14 - 20)  SpO2: 96% (12-26-24 @ 11:15) (95% - 100%)  Wt(kg): --    ALLERGIES  No Known Allergies      MEDICATIONS   acetaminophen     Tablet .. 650 milliGRAM(s) Oral every 6 hours PRN  aspirin  chewable 81 milliGRAM(s) Oral daily  atorvastatin 80 milliGRAM(s) Oral at bedtime  clopidogrel Tablet 75 milliGRAM(s) Oral daily  enoxaparin Injectable 40 milliGRAM(s) SubCutaneous every 24 hours  influenza  Vaccine (HIGH DOSE) 0.5 milliLiter(s) IntraMuscular once  lidocaine   4% Patch 1 Patch Transdermal daily  melatonin 3 milliGRAM(s) Oral at bedtime PRN      ----------------------------------------------------------------------------------------  PHYSICAL EXAM  Constitutional - NAD, Comfortable  HEENT - NCAT, EOMI  Neck - Supple, No limited ROM  Chest - CTA bilaterally, No wheeze, No rhonchi, No crackles  Cardiovascular - RRR, S1S2, No murmurs  Abdomen - BS+, Soft, NTND  Extremities - No C/C/E, No calf tenderness   Neurologic Exam -                    Cognitive - Awake, Alert, AAO to self, place, date, year, situation     Communication - Fluent, No dysarthria     Cranial Nerves - CN 2-12 intact     Motor - No focal deficits                    LEFT    UE - ShAB 5/5, EF 5/5, EE 5/5, WE 5/5,  5/5                    RIGHT UE - ShAB 5/5, EF 5/5, EE 5/5, WE 5/5,  5/5                    LEFT    LE - HF 5/5, KE 5/5, DF 5/5, PF 5/5                    RIGHT LE - HF 5/5, KE 5/5, DF 5/5, PF 5/5        Sensory - Intact to LT     Reflexes - DTR Intact, No primitive reflexive     Coordination - FTN intact     OculoVestibular - No saccades, No nystagmus, VOR         Balance - WNL Static  Psychiatric - Mood stable, Affect WNL  ----------------------------------------------------------------------------------------  ASSESSMENT/PLAN    Pain -  DVT PPX -   Rehab -     incomplete, consult in progress Patient is a 69y old  Male who presents with a chief complaint of Stroke (25 Dec 2024 22:31)      HPI:  Rigo Da Silva is a 69-year-old male with a past medical history of HTN presenting with a one day history of left sided weakness and a ground level fall.    Patient notes that he was well on the night of 12/23. When he woke up on 12/24, he noticed the left side of his body was weaker. He did not notice vision or voice changes. No headache or pain elsewhere. This weakness continued through the day and night. On 12/25, he was walking along flat ground and then fell. He attributes this fall to weakness. He did not experience head strike or loss of consciousness. He did not experience lightheadedness, vision changes, chest pain, or palpitations before the fall. He called EMS and was brought to the hospital.    Denies fevers/chills, lightheadedness, palpitations, chest pain, shortness of breath, abdominal pain, vision changes, nausea/vomiting, diarrhea/constipation, sick contacts, recent travel.    ED course: VS on presentation: T 98.5, HR 96, /85, RR 20, 95% RA. Code stroke was called. 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 CXR with minimally displaced fracture at the angle of the left 10th rib. Given aspirin, clopidogrel 300 mg, acetaminophen. (25 Dec 2024 22:31)      REVIEW OF SYSTEMS  Constitutional - No fever, No weight loss, No fatigue  HEENT - No eye pain, No visual disturbances, No difficulty hearing, No tinnitus, No vertigo, No neck pain  Respiratory - No cough, No wheezing, No shortness of breath  Cardiovascular - No chest pain, No palpitations  Gastrointestinal - No abdominal pain, No nausea, No vomiting, No diarrhea, No constipation  Genitourinary - No dysuria, No frequency, No hematuria, No incontinence  Neurological - No headaches, No memory loss, + loss of strength, No numbness, No tremors  Skin - No itching, No rashes, No lesions   Endocrine - No temperature intolerance  Musculoskeletal - No joint pain, No joint swelling, + pain L side from rib fx  Psychiatric - No depression, No anxiety    PAST MEDICAL & SURGICAL HISTORY  HTN (hypertension)    Unilateral inguinal hernia, without obstruction or gangrene, not specified as recurrent    H/O hernia repair    History of surgery on arm        SOCIAL HISTORY  Smoking - Denied  EtOH - Denied   Drugs - Denied    FUNCTIONAL HISTORY  Lives alone in home with 5 + 1 flight of stairs  Independent at baseline, works driving taxi  ex wife at bedside, may be able to assist at times    CURRENT FUNCTIONAL STATUS  pending    FAMILY HISTORY   Known health problems: none (Mother)    FH: stroke (Child)        RECENT LABS/IMAGING    CBC Full  -  ( 26 Dec 2024 07:06 )  WBC Count : 9.32 K/uL  RBC Count : 4.91 M/uL  Hemoglobin : 14.5 g/dL  Hematocrit : 41.4 %  Platelet Count - Automated : 280 K/uL  Mean Cell Volume : 84.3 fL  Mean Cell Hemoglobin : 29.5 pg  Mean Cell Hemoglobin Concentration : 35.0 g/dL  Auto Neutrophil # : 6.22 K/uL  Auto Lymphocyte # : 2.19 K/uL  Auto Monocyte # : 0.85 K/uL  Auto Eosinophil # : 0.02 K/uL  Auto Basophil # : 0.02 K/uL  Auto Neutrophil % : 66.8 %  Auto Lymphocyte % : 23.5 %  Auto Monocyte % : 9.1 %  Auto Eosinophil % : 0.2 %  Auto Basophil % : 0.2 %    12-25    136  |  96[L]  |  13  ----------------------------<  109[H]  3.7   |  24  |  1.03    Ca    9.9      25 Dec 2024 17:00  Phos  3.5     12-26  Mg     1.90     12-26    TPro  8.6[H]  /  Alb  4.2  /  TBili  0.5  /  DBili  x   /  AST  20  /  ALT  17  /  AlkPhos  65  12-25    Urinalysis Basic - ( 25 Dec 2024 17:00 )    Color: x / Appearance: x / SG: x / pH: x  Gluc: 109 mg/dL / Ketone: x  / Bili: x / Urobili: x   Blood: x / Protein: x / Nitrite: x   Leuk Esterase: x / RBC: x / WBC x   Sq Epi: x / Non Sq Epi: x / Bacteria: x        VITALS  T(C): 36.9 (12-26-24 @ 11:15), Max: 37.1 (12-26-24 @ 06:24)  HR: 80 (12-26-24 @ 11:15) (69 - 96)  BP: 145/85 (12-26-24 @ 11:15) (137/67 - 159/81)  RR: 18 (12-26-24 @ 11:15) (14 - 20)  SpO2: 96% (12-26-24 @ 11:15) (95% - 100%)  Wt(kg): --    ALLERGIES  No Known Allergies      MEDICATIONS   acetaminophen     Tablet .. 650 milliGRAM(s) Oral every 6 hours PRN  aspirin  chewable 81 milliGRAM(s) Oral daily  atorvastatin 80 milliGRAM(s) Oral at bedtime  clopidogrel Tablet 75 milliGRAM(s) Oral daily  enoxaparin Injectable 40 milliGRAM(s) SubCutaneous every 24 hours  influenza  Vaccine (HIGH DOSE) 0.5 milliLiter(s) IntraMuscular once  lidocaine   4% Patch 1 Patch Transdermal daily  melatonin 3 milliGRAM(s) Oral at bedtime PRN      ----------------------------------------------------------------------------------------  PHYSICAL EXAM  Constitutional - NAD, Comfortable  HEENT - NCAT, EOMI  Neck - Supple, No limited ROM  Chest - no respiratory distress  Cardiovascular - RRR, S1S2   Abdomen - BS+, Soft, NTND  Extremities - No C/C/E, No calf tenderness   Neurologic Exam -                    Cognitive - Awake, Alert, AAO to self, place, date, year, situation     Communication - Fluent, No dysarthria     Cranial Nerves - CN 2-12 intact     Motor -                     LEFT    UE - ShAB  2/5, EF 2/5, EE 2/5, WE 0/5,  0/5                    RIGHT UE - ShAB 5/5, EF 5/5, EE 5/5, WE 5/5,  5/5                    LEFT    LE - HF 2/5, KE 2/5, DF 0/5, PF 0/5                    RIGHT LE - HF 5/5, KE 5/5, DF 5/5, PF 5/5        Sensory - Intact to LT      OculoVestibular - No saccades, No nystagmus, VOR         Balance - WNL Static  Psychiatric - Mood stable, Affect WNL  ----------------------------------------------------------------------------------------  ASSESSMENT/PLAN   68 yo m p/w fall, L sided weakness, likely acute cva  PT, OT, SLP   MRI head pending  Pain -acetaminophen  DVT PPX - lovenox  turn and position q2   Rehab - anticipate acute inpatient rehab when medically cleared. Imaging and bedside therapy pending

## 2024-12-26 NOTE — PROGRESS NOTE ADULT - PROBLEM SELECTOR PLAN 5
On amlodipine 10mg QD as outpatient  - Gradual normotension <130/80 per Neurology recs  - Monitor BP, allow for permissive HTN for now

## 2024-12-26 NOTE — SWALLOW BEDSIDE ASSESSMENT ADULT - COMMENTS
CT Head/ CTA Head/ CTA Neck 12/25:   IMPRESSION: CT HEAD: 1. Subtle decreased attenuation appreciated within a right pontine location. Cannot exclude an ischemic event in this location. Consider further evaluation via MR imaging to include DWI and ADC mapping techniques, provided there are no contraindications. 2. No abnormal intraparenchymal or leptomeningeal enhancement. CTA NECK: 1. Proximal occlusion of the left vertebral artery with distal reconstitution at the level of C2-C3.. 2. Right vertebral artery is unremarkable in appearance. 3. Mild stenosis of approximately 33% at the proximal aspect of the right internal carotid artery with their is deposition of soft and calcified plaque. 4. Deposition of soft and calcified plaque along the proximal aspect of the left internal carotid artery, without significant stenosis. CTA HEAD: 1. No large vessel occlusion. 2. Moderate short segment stenosis involving the M1 segment of the left middle cerebral artery. 3. Short segment moderate stenosis involving the right P2 segment. 4. Right P1 segment is hypoplastic. There is relative fetal origin of the right posterior cerebral artery. 5. Short segment moderate stenosis involving the midsegment of the basilar artery. 6. No aneurysm identified. Tiny aneurysms can be beyond the resolution of CTA technique.    CXR 12/25: IMPRESSION: Minimally displaced fracture at the angle of the left 10th rib with no pneumothorax or other acute finding.    Patient received upright awake/ alert in bed, ex-wife present at bedside. Patient able to make wants/ needs known and follow simple directives. Patient denies difficulty with eating/ drinking and denies any facial weakness or changes in speech/ language output. Rehab Medicine MD present.

## 2024-12-26 NOTE — SWALLOW BEDSIDE ASSESSMENT ADULT - ADDITIONAL RECOMMENDATIONS
1. This service to follow for diet tolerance as schedule permits. 2. Medical team advised to reconsult this service if patient is with a change in medical status or change in tolerance of recommended PO. 3. Monitor for any changes in neurological status that may impact PO intake/ speech-language output.

## 2024-12-26 NOTE — SWALLOW BEDSIDE ASSESSMENT ADULT - ASR SWALLOW RECOMMEND DIAG
Objective testing NOT warranted at this time given no overt s/s penetration/ aspiration noted and no acute findings indicated on recent chest imaging

## 2024-12-26 NOTE — DIETITIAN INITIAL EVALUATION ADULT - PERTINENT MEDS FT
MEDICATIONS  (STANDING):  acetaminophen     Tablet .. 975 milliGRAM(s) Oral every 8 hours  aspirin  chewable 81 milliGRAM(s) Oral daily  atorvastatin 80 milliGRAM(s) Oral at bedtime  clopidogrel Tablet 75 milliGRAM(s) Oral daily  enoxaparin Injectable 40 milliGRAM(s) SubCutaneous every 24 hours  influenza  Vaccine (HIGH DOSE) 0.5 milliLiter(s) IntraMuscular once  lidocaine   4% Patch 1 Patch Transdermal daily    MEDICATIONS  (PRN):  ibuprofen  Tablet. 600 milliGRAM(s) Oral every 8 hours PRN Moderate Pain (4 - 6)  melatonin 3 milliGRAM(s) Oral at bedtime PRN Insomnia

## 2024-12-27 ENCOUNTER — TRANSCRIPTION ENCOUNTER (OUTPATIENT)
Age: 69
End: 2024-12-27

## 2024-12-27 ENCOUNTER — INPATIENT (INPATIENT)
Facility: HOSPITAL | Age: 69
LOS: 13 days | Discharge: HOME | DRG: 66 | End: 2025-01-10
Attending: PHYSICAL MEDICINE & REHABILITATION | Admitting: PHYSICAL MEDICINE & REHABILITATION
Payer: MEDICARE

## 2024-12-27 VITALS
DIASTOLIC BLOOD PRESSURE: 85 MMHG | OXYGEN SATURATION: 97 % | HEART RATE: 97 BPM | RESPIRATION RATE: 18 BRPM | SYSTOLIC BLOOD PRESSURE: 156 MMHG | TEMPERATURE: 97 F

## 2024-12-27 VITALS
HEART RATE: 74 BPM | OXYGEN SATURATION: 96 % | TEMPERATURE: 99 F | SYSTOLIC BLOOD PRESSURE: 164 MMHG | HEIGHT: 65 IN | DIASTOLIC BLOOD PRESSURE: 78 MMHG | RESPIRATION RATE: 14 BRPM | WEIGHT: 212.53 LBS

## 2024-12-27 DIAGNOSIS — I63.9 CEREBRAL INFARCTION, UNSPECIFIED: ICD-10-CM

## 2024-12-27 DIAGNOSIS — Z98.890 OTHER SPECIFIED POSTPROCEDURAL STATES: Chronic | ICD-10-CM

## 2024-12-27 LAB
APTT BLD: 33.7 SEC — SIGNIFICANT CHANGE UP (ref 24.5–35.6)
BASOPHILS # BLD AUTO: 0.03 K/UL — SIGNIFICANT CHANGE UP (ref 0–0.2)
BASOPHILS NFR BLD AUTO: 0.3 % — SIGNIFICANT CHANGE UP (ref 0–2)
EOSINOPHIL # BLD AUTO: 0.03 K/UL — SIGNIFICANT CHANGE UP (ref 0–0.5)
EOSINOPHIL NFR BLD AUTO: 0.3 % — SIGNIFICANT CHANGE UP (ref 0–6)
HCT VFR BLD CALC: 42.1 % — SIGNIFICANT CHANGE UP (ref 39–50)
HGB BLD-MCNC: 14.6 G/DL — SIGNIFICANT CHANGE UP (ref 13–17)
IANC: 5.27 K/UL — SIGNIFICANT CHANGE UP (ref 1.8–7.4)
IMM GRANULOCYTES NFR BLD AUTO: 0.3 % — SIGNIFICANT CHANGE UP (ref 0–0.9)
INR BLD: 1.05 RATIO — SIGNIFICANT CHANGE UP (ref 0.85–1.16)
LYMPHOCYTES # BLD AUTO: 2.58 K/UL — SIGNIFICANT CHANGE UP (ref 1–3.3)
LYMPHOCYTES # BLD AUTO: 29.6 % — SIGNIFICANT CHANGE UP (ref 13–44)
MAGNESIUM SERPL-MCNC: 1.7 MG/DL — SIGNIFICANT CHANGE UP (ref 1.6–2.6)
MCHC RBC-ENTMCNC: 29.1 PG — SIGNIFICANT CHANGE UP (ref 27–34)
MCHC RBC-ENTMCNC: 34.7 G/DL — SIGNIFICANT CHANGE UP (ref 32–36)
MCV RBC AUTO: 84 FL — SIGNIFICANT CHANGE UP (ref 80–100)
MONOCYTES # BLD AUTO: 0.79 K/UL — SIGNIFICANT CHANGE UP (ref 0–0.9)
MONOCYTES NFR BLD AUTO: 9 % — SIGNIFICANT CHANGE UP (ref 2–14)
NEUTROPHILS # BLD AUTO: 5.27 K/UL — SIGNIFICANT CHANGE UP (ref 1.8–7.4)
NEUTROPHILS NFR BLD AUTO: 60.5 % — SIGNIFICANT CHANGE UP (ref 43–77)
NRBC # BLD: 0 /100 WBCS — SIGNIFICANT CHANGE UP (ref 0–0)
NRBC # FLD: 0 K/UL — SIGNIFICANT CHANGE UP (ref 0–0)
PHOSPHATE SERPL-MCNC: 3.1 MG/DL — SIGNIFICANT CHANGE UP (ref 2.5–4.5)
PLATELET # BLD AUTO: 289 K/UL — SIGNIFICANT CHANGE UP (ref 150–400)
PROTHROM AB SERPL-ACNC: 12.2 SEC — SIGNIFICANT CHANGE UP (ref 9.9–13.4)
RBC # BLD: 5.01 M/UL — SIGNIFICANT CHANGE UP (ref 4.2–5.8)
RBC # FLD: 11.6 % — SIGNIFICANT CHANGE UP (ref 10.3–14.5)
SARS-COV-2 RNA SPEC QL NAA+PROBE: SIGNIFICANT CHANGE UP
WBC # BLD: 8.73 K/UL — SIGNIFICANT CHANGE UP (ref 3.8–10.5)
WBC # FLD AUTO: 8.73 K/UL — SIGNIFICANT CHANGE UP (ref 3.8–10.5)

## 2024-12-27 PROCEDURE — ZZZZZ: CPT

## 2024-12-27 PROCEDURE — 70551 MRI BRAIN STEM W/O DYE: CPT | Mod: 26

## 2024-12-27 PROCEDURE — 99233 SBSQ HOSP IP/OBS HIGH 50: CPT

## 2024-12-27 PROCEDURE — 99239 HOSP IP/OBS DSCHRG MGMT >30: CPT

## 2024-12-27 PROCEDURE — 99232 SBSQ HOSP IP/OBS MODERATE 35: CPT

## 2024-12-27 RX ORDER — CLOPIDOGREL BISULFATE 75 MG/1
1 TABLET, FILM COATED ORAL
Qty: 0 | Refills: 0 | DISCHARGE
Start: 2024-12-27

## 2024-12-27 RX ORDER — ATORVASTATIN CALCIUM 40 MG/1
1 TABLET, FILM COATED ORAL
Qty: 0 | Refills: 0 | DISCHARGE
Start: 2024-12-27

## 2024-12-27 RX ORDER — CHLORHEXIDINE GLUCONATE 1.2 MG/ML
1 RINSE ORAL DAILY
Refills: 0 | Status: DISCONTINUED | OUTPATIENT
Start: 2024-12-27 | End: 2024-12-27

## 2024-12-27 RX ORDER — LIDOCAINE 50 MG/G
1 OINTMENT TOPICAL
Qty: 0 | Refills: 0 | DISCHARGE
Start: 2024-12-27

## 2024-12-27 RX ORDER — ASPIRIN 81 MG
81 TABLET, DELAYED RELEASE (ENTERIC COATED) ORAL DAILY
Refills: 0 | Status: DISCONTINUED | OUTPATIENT
Start: 2024-12-28 | End: 2025-01-10

## 2024-12-27 RX ORDER — ATORVASTATIN CALCIUM 40 MG/1
80 TABLET, FILM COATED ORAL AT BEDTIME
Refills: 0 | Status: DISCONTINUED | OUTPATIENT
Start: 2024-12-27 | End: 2025-01-10

## 2024-12-27 RX ORDER — GINKGO BILOBA 40 MG
3 CAPSULE ORAL AT BEDTIME
Refills: 0 | Status: DISCONTINUED | OUTPATIENT
Start: 2024-12-27 | End: 2025-01-10

## 2024-12-27 RX ORDER — LIDOCAINE 50 MG/G
1 OINTMENT TOPICAL DAILY
Refills: 0 | Status: DISCONTINUED | OUTPATIENT
Start: 2024-12-27 | End: 2025-01-10

## 2024-12-27 RX ORDER — ASPIRIN 81 MG
1 TABLET, DELAYED RELEASE (ENTERIC COATED) ORAL
Qty: 0 | Refills: 0 | DISCHARGE
Start: 2024-12-27

## 2024-12-27 RX ORDER — ENOXAPARIN SODIUM 60 MG/.6ML
40 INJECTION INTRAVENOUS; SUBCUTANEOUS EVERY 24 HOURS
Refills: 0 | Status: DISCONTINUED | OUTPATIENT
Start: 2024-12-28 | End: 2025-01-10

## 2024-12-27 RX ORDER — GINKGO BILOBA 40 MG
1 CAPSULE ORAL
Qty: 0 | Refills: 0 | DISCHARGE
Start: 2024-12-27

## 2024-12-27 RX ORDER — ACETAMINOPHEN 80 MG/.8ML
3 SOLUTION/ DROPS ORAL
Qty: 0 | Refills: 0 | DISCHARGE
Start: 2024-12-27

## 2024-12-27 RX ORDER — CLOPIDOGREL BISULFATE 75 MG/1
75 TABLET, FILM COATED ORAL DAILY
Refills: 0 | Status: DISCONTINUED | OUTPATIENT
Start: 2024-12-28 | End: 2025-01-10

## 2024-12-27 RX ADMIN — ACETAMINOPHEN 975 MILLIGRAM(S): 80 SOLUTION/ DROPS ORAL at 06:58

## 2024-12-27 RX ADMIN — CHLORHEXIDINE GLUCONATE 1 APPLICATION(S): 1.2 RINSE ORAL at 13:25

## 2024-12-27 RX ADMIN — ENOXAPARIN SODIUM 40 MILLIGRAM(S): 60 INJECTION INTRAVENOUS; SUBCUTANEOUS at 07:25

## 2024-12-27 RX ADMIN — ATORVASTATIN CALCIUM 80 MILLIGRAM(S): 40 TABLET, FILM COATED ORAL at 21:56

## 2024-12-27 RX ADMIN — LIDOCAINE 1 PATCH: 50 OINTMENT TOPICAL at 22:03

## 2024-12-27 RX ADMIN — ACETAMINOPHEN 975 MILLIGRAM(S): 80 SOLUTION/ DROPS ORAL at 13:23

## 2024-12-27 RX ADMIN — CLOPIDOGREL BISULFATE 75 MILLIGRAM(S): 75 TABLET, FILM COATED ORAL at 13:22

## 2024-12-27 RX ADMIN — Medication 81 MILLIGRAM(S): at 13:22

## 2024-12-27 NOTE — PROGRESS NOTE ADULT - ASSESSMENT
70yo w PMH of HTN, comes in with left sided weakness  O/E LUE no effort against gravity, LLE some effort against gravity NIHSS 5  CTH subtle R Pontine hypoattenuation  CTA: Proximal occlusion of the left vertebral artery with distal reconstitution at the level of C2-C3. Mild R ICA stenosis. left M! 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 Marquise Fraser at [phone]
Rigo Da Silva is a 69-year-old male with a past medical history of HTN presenting with a one day history of left sided weakness and a ground level fall concerning for an acute ischemic stroke and found to have a rib fracture. Code stroke was called and neurology consulted. NIHSS 4, LKW 12/23 2200

## 2024-12-27 NOTE — DISCHARGE NOTE NURSING/CASE MANAGEMENT/SOCIAL WORK - NSDCCRNAME_GEN_ALL_CORE_FT
[facility] address: [address] [place] [phone], 6pm  via [facility] ambulance

## 2024-12-27 NOTE — DISCHARGE NOTE NURSING/CASE MANAGEMENT/SOCIAL WORK - FLU SEASON?
Prior authorization approved   Case ID: [id number]   Payer: Cole Ahn case has attachments. Please follow the hyperlink to view the attachment. The request has been approved. The authorization is effective from 09/06/2023 to 09/05/2024, as long as the member is enrolled in their current health plan. The request was approved as submitted. A written notification letter will follow with additional details.  Approval Details    Authorized from September 6, 2023 to September 5, 2024   Electronic appeal: SupportedView History   
Submitted prior auth in Epic for [employer], awaiting response.   
Yes...

## 2024-12-27 NOTE — DISCHARGE NOTE PROVIDER - CARE PROVIDERS DIRECT ADDRESSES
,DirectAddress_Unknown,DirectAddress_Unknown,[email or web address]

## 2024-12-27 NOTE — DISCHARGE NOTE NURSING/CASE MANAGEMENT/SOCIAL WORK - PATIENT PORTAL LINK FT
You can access the FollowMyHealth Patient Portal offered by [facility] by registering at the following website: [email or web address]. By joining [employer]’s FollowMyHealth portal, you will also be able to view your health information using other applications (apps) compatible with our system.

## 2024-12-27 NOTE — DISCHARGE NOTE PROVIDER - NSDCCPCAREPLAN_GEN_ALL_CORE_FT
PRINCIPAL DISCHARGE DIAGNOSIS  Diagnosis: Stroke  Assessment and Plan of Treatment: Presented with acute onset left sided weakness. CT head with hypoattenuation in the right pontine location concerning for an acute ischemic stroke. Seen by Neurology, you received aspirin and plavix which are to be continued on discharge. Plavix is to be given for 3 months. Lipitor was also given. You were seen by Physical/Occupational therapy and PM&R and are now going to Acute Rehab  Neurology suggested to place an extended cardiac monitoring, however EP evaluated you and you are undecided at this time, to follow up with EP as outpatient  - MRI brain: Acute right-sided pontine infarct  - TTE: EF 74%. Mild LVH. Increased LV mass/concentric hypertrophy. Mild (grade 1) LV diastolic dysfunction. Trace MR. Mild AI. Saline injection reveals bubbles in the L heart, but is inconclusive for presence of an intracardial shunt  - Please take all the medications as prescribed  - ILR to be done as outpatient if pt is agreeable (f/up w/ EP as above)  - Follow up with Neurology, Dr. Marquise Fraser at [phone]  - PMD f/up after discharge from Rehab      SECONDARY DISCHARGE DIAGNOSES  Diagnosis: Rib fracture  Assessment and Plan of Treatment: CXR/Rib X-Ray with minimally displaced fracture seen at the angle of the left 10th rib, likely from the fall you had due to weakness. Saturating well on RA.  - C/w pain control as needed  - PMD f/up after discharge from Rehab      Diagnosis: Hypertension  Assessment and Plan of Treatment: Continue taking amlodipine as prescribed  - PMD f/up after discharge from Rehab

## 2024-12-27 NOTE — DISCHARGE NOTE PROVIDER - PROVIDER TOKENS
PROVIDER:[TOKEN:[[id number]]],FREE:[LAST:[Your Primary Care Provider],PHONE:[(   )    -],FAX:[(   )    -]],PROVIDER:[TOKEN:[[id number]]]

## 2024-12-27 NOTE — PATIENT PROFILE ADULT - FALL HARM RISK - HARM RISK INTERVENTIONS

## 2024-12-27 NOTE — H&P ADULT - NSHPPHYSICALEXAM_GEN_ALL_CORE
----------------------------------------------------------------------------------------    PHYSICAL EXAM  Constitutional - NAD, Comfortable  HEENT - NCAT, EOMI  Neck - Supple, No limited ROM  Chest - no respiratory distress  Cardiovascular - RRR,  Abdomen - BS+, Soft, NTND  Extremities - No C/C/E, No calf tenderness   Neurologic Exam -                    Cognitive - Awake, Alert, AAO to self, place, date, year, situation     Communication - Fluent, + dysarthria     Cranial Nerves - CN 2-12 intact     Motor -                     LEFT    UE - ShAB  1/5, EF 2/5, EE 2/5, WE 0/5,  0/5                    RIGHT UE - ShAB 5/5, EF 5/5, EE 5/5, WE 5/5,  5/5                    LEFT    LE - HF 2/5, KE 2/5, DF 0/5, PF 0/5                    RIGHT LE - HF 5/5, KE 5/5, DF 5/5, PF 5/5        Sensory - Intact to LT      OculoVestibular - No saccades, No nystagmus, VOR         Balance - WNL Static  Psychiatric - Mood stable, Affect WNL

## 2024-12-27 NOTE — CONSULT NOTE ADULT - ASSESSMENT
This is a 69 year old PMH of HTN presented with left sided weakness which started 12/24 when he woke up. He stated that his weakness started with the leg and quickly progressed to arm. His weakness worsened to a point where he had a fall while walking on 12/25, hurt his chest and head on left side an found to have   acute right-sided pontine infarct. TTE was significant for an inconclusive for the presence of an intracardial shunt. Per CRYSTAL-AF, patient will benefit from prolonged monitoring for detection of AF/PAF as cause of potential cardioembolic source.  ILR implantation for prolonged monitoring for detection of suspected, asymptomatic AF/PAF advised as 2D TTE is without FAISAL clot. There are no contraindications to long term anticoagulation. The risks and benefits were explained in detail which included but not limited to bleeding, infection, stroke, syncope 2/2 vasovagal, intubation, and death. Patient expressed understanding and all questions were answered. Patient is undecided and will discuss with his family. Ep will sign off and can be called back if patient is agreeable to ILR.     Plan:  - Continuous telemetric monitoring  - Monitor electrolytes and replete K to 4 and Mg to 2  -. TTE was significant for an inconclusive for the presence of an intracardial shunt, normal systolic LV function with an EF of 74%, mildly increase LV wall thickness, mild left ventricular diastolic dysfunction, trace MR, and mild AR. Report as above  - ILR is patient is agreeable   - Continue care per primary team    Lachelle Hoffmann PA-C  Patient to be staff with attending. Please await attending addendum

## 2024-12-27 NOTE — H&P ADULT - HISTORY OF PRESENT ILLNESS
Rigo Da Silva is a 69-year-old male with a past medical history of HTN presenting with a one day history of left sided weakness and a ground level fall concerning for an acute ischemic stroke and found to have a rib fracture. Code stroke was called and neurology consulted. NIHSS 4, LKW 12/23 2200. CT head with hypoattenuation in the right pontine location concerning for an acute ischemic stroke. Presented >24 hours from symptom onset. Likely 2/2 to intracranial atherosclerosis per neurology. PT/OT/PM&R evaluated patient and rec  acute inpatient rehab. Patient was optimized for discharge on 12/27 to [facility].

## 2024-12-27 NOTE — DISCHARGE NOTE PROVIDER - CARE PROVIDER_API CALL
Marquise Fraser)  Neurology  [address] [facility], [address]  [place] [address]  Phone: [phone]  Fax: [phone]  Follow Up Time:     Your Primary Care Provider,   Phone: (   )    -  Fax: (   )    -  Follow Up Time:     Demetri Lee  Cardiac Electrophysiology  [address] [place] [address], [address]  [place] [address]  Phone: [phone]  Fax: [phone]  Follow Up Time:

## 2024-12-27 NOTE — PROGRESS NOTE ADULT - SUBJECTIVE AND OBJECTIVE BOX
Patient seen and examined this am. No new events      MEDICATIONS:    acetaminophen     Tablet .. 975 milliGRAM(s) Oral every 8 hours  aspirin  chewable 81 milliGRAM(s) Oral daily  atorvastatin 80 milliGRAM(s) Oral at bedtime  clopidogrel Tablet 75 milliGRAM(s) Oral daily  enoxaparin Injectable 40 milliGRAM(s) SubCutaneous every 24 hours  ibuprofen  Tablet. 600 milliGRAM(s) Oral every 8 hours PRN  influenza  Vaccine (HIGH DOSE) 0.5 milliLiter(s) IntraMuscular once  lidocaine   4% Patch 1 Patch Transdermal daily  melatonin 3 milliGRAM(s) Oral at bedtime PRN      LABS:                          14.6   8.73  )-----------( 289      ( 27 Dec 2024 06:39 )             42.1     12-25    136  |  96[L]  |  13  ----------------------------<  109[H]  3.7   |  24  |  1.03    Ca    9.9      25 Dec 2024 17:00  Phos  3.1     12-27  Mg     1.70     12-27    TPro  8.6[H]  /  Alb  4.2  /  TBili  0.5  /  DBili  x   /  AST  20  /  ALT  17  /  AlkPhos  65  12-25    CAPILLARY BLOOD GLUCOSE        PT/INR - ( 27 Dec 2024 06:39 )   PT: 12.2 sec;   INR: 1.05 ratio         PTT - ( 27 Dec 2024 06:39 )  PTT:33.7 sec  Urinalysis Basic - ( 25 Dec 2024 17:00 )    Color: x / Appearance: x / SG: x / pH: x  Gluc: 109 mg/dL / Ketone: x  / Bili: x / Urobili: x   Blood: x / Protein: x / Nitrite: x   Leuk Esterase: x / RBC: x / WBC x   Sq Epi: x / Non Sq Epi: x / Bacteria: x      I&O's Summary    26 Dec 2024 07:01  -  27 Dec 2024 07:00  --------------------------------------------------------  IN: 0 mL / OUT: 1200 mL / NET: -1200 mL      Vital Signs Last 24 Hrs  T(C): 36.8 (27 Dec 2024 09:15), Max: 37.1 (27 Dec 2024 00:00)  T(F): 98.3 (27 Dec 2024 09:15), Max: 98.7 (27 Dec 2024 00:00)  HR: 79 (27 Dec 2024 09:15) (61 - 80)  BP: 151/86 (27 Dec 2024 09:15) (139/80 - 158/94)  BP(mean): --  RR: 18 (27 Dec 2024 09:15) (18 - 18)  SpO2: 98% (27 Dec 2024 09:15) (96% - 100%)    Parameters below as of 27 Dec 2024 09:15  Patient On (Oxygen Delivery Method): room air      Neuro: AAOx3; knows age, month, obeys commands, no dysarthria; calculations intact, fluent names, repeats     CN: PERRL, EOMI, VFF normal gaze preference, no facial palsy,     Motor: LUE no effort against gravity, LLe some effort    Sensory: Intact to LT and PP no extinction    Coordination: FTN intact b/l      < from: MR Head No Cont (12.27.24 @ 08:41) >  IMPRESSION: Acute right-sided pontine infarct.    --- End of Report ---    < end of copied text >  < from: CT Angio Head w/ IV Cont (12.25.24 @ 18:26) >    CTA NECK:  1. Proximal occlusion of the left vertebral artery with distal   reconstitution at the level of C2-C3..  2. Right vertebral artery is unremarkable in appearance.  3. Mild stenosis of approximately 33% at the proximal aspect of the right   internal carotid artery with their is deposition of soft and calcified   plaque.  4. Deposition of soft and calcified plaque along the proximal aspect of   the left internal carotid artery, without significant stenosis.    CTA HEAD:  1. No large vessel occlusion.  2. Moderate short segment stenosis involving the M1 segment of the left   middle cerebral artery.  3. Short segment moderate stenosis involving the right P2 segment.  4. Right P1 segment is hypoplastic. There is relative fetal origin of the   right posterior cerebral artery.  5. Short segment moderate stenosis involving the midsegment of the   basilar artery.  6. No aneurysm identified. Tiny aneurysms can be beyond the resolution of  CTA technique.    Findings were discussed with Dr. Caitie Taylor 12/25/2024 7:50 PM by Dr. Behr Ventura with read back confirmation.    --- End of Report ---    < end of copied text >      < from: TTE W or WO Ultrasound Enhancing Agent (12.26.24 @ 12:36) >   1. Technically difficult image quality.   2. The left ventricular cavity is normal in size. Left ventricular wall thickness is mildly increased. Left ventricular systolic function is normal with a calculated ejection fraction of 74 % by the Quach's biplane method of disks. There are no regional wall motion abnormalities seen. Mild left ventricular hypertrophy. There is increased LV mass and concentric hypertrophy. There is mild (grade 1) left ventricular diastolic dysfunction.   3. The right ventricle is not well visualized.   4. Structurally normal mitral valve with normal leaflet excursion. There is calcification of the mitral valve annulus. There is trace mitral regurgitation.   5. The aorticvalve appears trileaflet with normal systolic excursion. There is calcification of the aortic valve leaflets. There is mild aortic regurgitation.   6. Agitated saline injection reveals bubbles in the left heart, but is inconclusive for the presence of an intracardial shunt.    < end of copied text >  
Patient is a 69y old  Male who presents with a chief complaint of Stroke (27 Dec 2024 12:46)      HPI:  Rigo Da Silva is a 69-year-old male with a past medical history of HTN presenting with a one day history of left sided weakness and a ground level fall.    Patient notes that he was well on the night of 12/23. When he woke up on 12/24, he noticed the left side of his body was weaker. He did not notice vision or voice changes. No headache or pain elsewhere. This weakness continued through the day and night. On 12/25, he was walking along flat ground and then fell. He attributes this fall to weakness. He did not experience head strike or loss of consciousness. He did not experience lightheadedness, vision changes, chest pain, or palpitations before the fall. He called EMS and was brought to the hospital.    Denies fevers/chills, lightheadedness, palpitations, chest pain, shortness of breath, abdominal pain, vision changes, nausea/vomiting, diarrhea/constipation, sick contacts, recent travel.    ED course: VS on presentation: T 98.5, HR 96, /85, RR 20, 95% RA. Code stroke was called. 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 CXR with minimally displaced fracture at the angle of the left 10th rib. Given aspirin, clopidogrel 300 mg, acetaminophen. (25 Dec 2024 22:31)    found to have acute pontine infarct. L sided weakness unchanged.    REVIEW OF SYSTEMS  Constitutional - No fever, No weight loss, No fatigue  HEENT - No eye pain, No visual disturbances, No difficulty hearing, No tinnitus, No vertigo, No neck pain  Respiratory - No cough, No wheezing, No shortness of breath  Cardiovascular - No chest pain, No palpitations  Gastrointestinal - No abdominal pain, No nausea, No vomiting, No diarrhea, No constipation  Genitourinary - No dysuria, No frequency, No hematuria, No incontinence  Neurological - No headaches, No memory loss, + loss of strength, No numbness, No tremors  Skin - No itching, No rashes, No lesions   Endocrine - No temperature intolerance  Musculoskeletal - No joint pain, No joint swelling, No muscle pain  Psychiatric - No depression, No anxiety    PAST MEDICAL & SURGICAL HISTORY  HTN (hypertension)    Unilateral inguinal hernia, without obstruction or gangrene, not specified as recurrent    H/O hernia repair    History of surgery on arm         CURRENT FUNCTIONAL STATUS  12/26  · Manual Muscle Testing Results	grossly assessed due to  right LE grossly >/= 4/5 ; left LE grossly >/= 3/5     Bed Mobility: Sit to Supine:     · Level of [place]	minimum assist (75% patients effort)  · Physical Assist/Nonphysical Assist	nonverbal cues (demo/gestures); 1 person assist  · Assistive Device	bed rails    Bed Mobility: Supine to Sit:     · Level of [place]	minimum assist (75% patients effort)  · Physical Assist/Nonphysical Assist	nonverbal cues (demo/gestures); 1 person assist; verbal cues  · Assistive Device	bed rails    Bed Mobility Analysis:     · Bed Mobility Limitations	decreased ability to use arms for pushing/pulling; decreased ability to use legs for bridging/pushing; impaired ability to control trunk for mobility  · Impairments Contributing to Impaired Bed Mobility	impaired postural control; decreased strength; impaired coordination    Transfer: Sit to Stand:     · Level of [place]	minimum assist (75% patients effort); blocking left knee to prevent buckling  · Physical Assist/Nonphysical Assist	2 person assist; nonverbal cues (demo/gestures); verbal cues  · Assistive Device	bilateral hand held assistance    Transfer: Stand to Sit:     · Level of [place]	minimum assist (75% patients effort); blocking left knee to prevent buckling  · Physical Assist/Nonphysical Assist	2 person assist  · Assistive Device	bilateral hand held assistance    Sit/Stand Transfer Safety Analysis:     · Transfer Safety Concerns Noted	decreased step length; losing balance; decreased weight-shifting ability  · Impairments Contributing to Impaired Transfers	impaired postural control; decreased strength    Gait Skills:     · Level of [place]	minimum assist (75% patients effort); Blocking left knee to prevent buckling  · Physical Assist/Nonphysical Assist	2 person assist; nonverbal cues (demo/gestures); verbal cues  · Weight-Bearing Restrictions	weight-bearing as tolerated  · Assistive Device	bilateral hand held assistance  · Gait Distance	2 side steps    Gait Analysis:     · Gait Pattern Used	2-point gait   · Gait Deviations Noted	decreased leatha; decreased step length; decreased stride length; decreased weight-shifting ability  · Impairments Contributing to Gait Deviations	decreased strength; impaired postural control    Balance Skills Assessment:     · Sitting Balance: Static	good minus   · Sitting Balance: Dynamic	good minus   · Sit-to-Stand Balance	fair balance   · Standing Balance: Static	poor plus   · Standing Balance: Dynamic	poor balance       FAMILY HISTORY   Known health problems: none (Mother)    FH: stroke (Child)        RECENT LABS/IMAGING  CBC Full  -  ( 27 Dec 2024 06:39 )  WBC Count : 8.73 K/uL  RBC Count : 5.01 M/uL  Hemoglobin : 14.6 g/dL  Hematocrit : 42.1 %  Platelet Count - Automated : 289 K/uL  Mean Cell Volume : 84.0 fL  Mean Cell Hemoglobin : 29.1 pg  Mean Cell Hemoglobin Concentration : 34.7 g/dL  Auto Neutrophil # : 5.27 K/uL  Auto Lymphocyte # : 2.58 K/uL  Auto Monocyte # : 0.79 K/uL  Auto Eosinophil # : 0.03 K/uL  Auto Basophil # : 0.03 K/uL  Auto Neutrophil % : 60.5 %  Auto Lymphocyte % : 29.6 %  Auto Monocyte % : 9.0 %  Auto Eosinophil % : 0.3 %  Auto Basophil % : 0.3 %    12-25    136  |  96[L]  |  13  ----------------------------<  109[H]  3.7   |  24  |  1.03    Ca    9.9      25 Dec 2024 17:00  Phos  3.1     12-27  Mg     1.70     12-27    TPro  8.6[H]  /  Alb  4.2  /  TBili  0.5  /  DBili  x   /  AST  20  /  ALT  17  /  AlkPhos  65  12-25    Urinalysis Basic - ( 25 Dec 2024 17:00 )    Color: x / Appearance: x / SG: x / pH: x  Gluc: 109 mg/dL / Ketone: x  / Bili: x / Urobili: x   Blood: x / Protein: x / Nitrite: x   Leuk Esterase: x / RBC: x / WBC x   Sq Epi: x / Non Sq Epi: x / Bacteria: x        VITALS  T(C): 36.2 (12-27-24 @ 13:15), Max: 37.1 (12-27-24 @ 00:00)  HR: 97 (12-27-24 @ 13:15) (61 - 97)  BP: 156/85 (12-27-24 @ 13:15) (139/80 - 158/94)  RR: 18 (12-27-24 @ 13:15) (18 - 18)  SpO2: 97% (12-27-24 @ 13:15) (97% - 100%)  Wt(kg): --    ALLERGIES  No Known Allergies      MEDICATIONS   acetaminophen     Tablet .. 975 milliGRAM(s) Oral every 8 hours  aspirin  chewable 81 milliGRAM(s) Oral daily  atorvastatin 80 milliGRAM(s) Oral at bedtime  chlorhexidine 2% Cloths 1 Application(s) Topical daily  clopidogrel Tablet 75 milliGRAM(s) Oral daily  enoxaparin Injectable 40 milliGRAM(s) SubCutaneous every 24 hours  ibuprofen  Tablet. 600 milliGRAM(s) Oral every 8 hours PRN  influenza  Vaccine (HIGH DOSE) 0.5 milliLiter(s) IntraMuscular once  lidocaine   4% Patch 1 Patch Transdermal daily  melatonin 3 milliGRAM(s) Oral at bedtime PRN      ----------------------------------------------------------------------------------------    PHYSICAL EXAM  Constitutional - NAD, Comfortable  HEENT - NCAT, EOMI  Neck - Supple, No limited ROM  Chest - no respiratory distress  Cardiovascular - RRR, S1S2   Abdomen - BS+, Soft, NTND  Extremities - No C/C/E, No calf tenderness   Neurologic Exam -                    Cognitive - Awake, Alert, AAO to self, place, date, year, situation     Communication - Fluent, No dysarthria     Cranial Nerves - CN 2-12 intact     Motor -                     LEFT    UE - ShAB  2/5, EF 2/5, EE 2/5, WE 0/5,  0/5                    RIGHT UE - ShAB 5/5, EF 5/5, EE 5/5, WE 5/5,  5/5                    LEFT    LE - HF 2/5, KE 2/5, DF 0/5, PF 0/5                    RIGHT LE - HF 5/5, KE 5/5, DF 5/5, PF 5/5        Sensory - Intact to LT      OculoVestibular - No saccades, No nystagmus, VOR         Balance - WNL Static  Psychiatric - Mood stable, Affect WNL  ----------------------------------------------------------------------------------------  ASSESSMENT/PLAN   70 yo m p/w fall, L sided weakness,  acute pontine infarct  PT, OT, SLP    Pain -acetaminophen  DVT PPX - lovenox  turn and position q2   Rehab - recommend acute inpatient rehab when medically cleared.  Patient can tolerate 3 hours per day of therapy with medical supervision
  Patient is a 69y old  Male who presents with a chief complaint of Stroke (26 Dec 2024 12:42)      INTERVAL HPI/OVERNIGHT EVENTS:  Seen by me this afternoon, Ex-Wife Tegan at bedside, c/o some left lateral pain, no other complaints, tolerating PO, no speech/swallow issues.    Review of Systems: 12 point review of systems otherwise negative    MEDICATIONS  (STANDING):  acetaminophen     Tablet .. 975 milliGRAM(s) Oral every 8 hours  aspirin  chewable 81 milliGRAM(s) Oral daily  atorvastatin 80 milliGRAM(s) Oral at bedtime  clopidogrel Tablet 75 milliGRAM(s) Oral daily  enoxaparin Injectable 40 milliGRAM(s) SubCutaneous every 24 hours  influenza  Vaccine (HIGH DOSE) 0.5 milliLiter(s) IntraMuscular once  lidocaine   4% Patch 1 Patch Transdermal daily    MEDICATIONS  (PRN):  ibuprofen  Tablet. 600 milliGRAM(s) Oral every 8 hours PRN Moderate Pain (4 - 6)  melatonin 3 milliGRAM(s) Oral at bedtime PRN Insomnia      Allergies    No Known Allergies    Intolerances          Vital Signs Last 24 Hrs  T(C): 36.9 (26 Dec 2024 11:15), Max: 37.1 (26 Dec 2024 06:24)  T(F): 98.4 (26 Dec 2024 11:15), Max: 98.7 (26 Dec 2024 06:24)  HR: 79 (26 Dec 2024 15:03) (69 - 83)  BP: 158/74 (26 Dec 2024 15:03) (137/67 - 159/81)  BP(mean): --  RR: 18 (26 Dec 2024 15:03) (14 - 18)  SpO2: 100% (26 Dec 2024 15:03) (96% - 100%)    Parameters below as of 26 Dec 2024 15:03  Patient On (Oxygen Delivery Method): room air      CAPILLARY BLOOD GLUCOSE            Physical Exam:    Daily Height in cm: 167.64 (25 Dec 2024 22:40)    Daily   General:  Well appearing, NAD, not cachetic, fluent speech  HEENT:  Nonicteric, PERRLA  CV:  RRR, no murmur, no JVD  Lungs:  CTA B/L, no wheezes, rales, rhonchi  Abdomen:  Soft, non-tender, no distended, positive BS, no hepatosplenomegaly  Extremities:  2+ pulses, no c/c, no edema  Skin:  Warm and dry, no rashes  :  No lane  Neuro:  AAOx3, Strenght LLE 3+/5, LUE 2+/5  No Restraints    LABS:                        14.5   9.32  )-----------( 280      ( 26 Dec 2024 07:06 )             41.4     12-25    136  |  96[L]  |  13  ----------------------------<  109[H]  3.7   |  24  |  1.03    Ca    9.9      25 Dec 2024 17:00  Phos  3.5     12-26  Mg     1.90     12-26    TPro  8.6[H]  /  Alb  4.2  /  TBili  0.5  /  DBili  x   /  AST  20  /  ALT  17  /  AlkPhos  65  12-25    PT/INR - ( 26 Dec 2024 07:06 )   PT: 12.4 sec;   INR: 1.04 ratio         PTT - ( 26 Dec 2024 07:06 )  PTT:35.8 sec  Urinalysis Basic - ( 25 Dec 2024 17:00 )    Color: x / Appearance: x / SG: x / pH: x  Gluc: 109 mg/dL / Ketone: x  / Bili: x / Urobili: x   Blood: x / Protein: x / Nitrite: x   Leuk Esterase: x / RBC: x / WBC x   Sq Epi: x / Non Sq Epi: x / Bacteria: x          RADIOLOGY & ADDITIONAL TESTS:  Reviewed by me

## 2024-12-27 NOTE — CONSULT NOTE ADULT - SUBJECTIVE AND OBJECTIVE BOX
Source: Patient, chart, and family member at bedside    HPI:  This is a 69 year old PMH of HTN presented with left sided weakness which started 12/24 when he woke up. He stated that his weakness started with the leg and quickly progressed to arm. His weakness worsened to a point where he had a fall while walking on 12/25, hurt his chest and head on left side.     Patient denied fever, chills, diaphoresis, HA, facial drooping, slurred speech, lightheadedness, dizziness, changes in visions, cold like symptoms  (sore throat, cough, conjunctivitis runny nose),palpitation, orthopnea, SOB, abdominal pain, n/v/d, hematuria, melena, hematochezia, tremor, numbness and tingling. Patient denied atrial arrhythmia.     ED course was significant for  T 98.5, HR 96, /85, RR 20, 95% RA. Labs were significant for WBC 9.76, H/H 14.6/42.6, , Na 136, K 3.7, BUN 13, sCr 1.03, AST 20, ALT 17, Alk phos 65, PT 12.4, and INR 1.07. EKG showed SR HR 77bpm. Code stroke was called. 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 CXR with minimally displaced fracture at the angle of the left 10th rib. Given aspirin, clopidogrel 300 mg, acetaminophen. MRI showed acute right-sided pontine infarct. TTE was significant for an inconclusive for the presence of an intracardial shunt, normal systolic LV function with an EF of 74%, mildly increase LV wall thickness, mild left ventricular diastolic dysfunction, trace MR, and mild AR. Per CRYSTAL-AF, patient will benefit from prolonged monitoring for detection of AF/PAF as cause of potential cardioembolic source. The risks and benefits were explained in detail which included but not limited to bleeding, infection, stroke, syncope 2/2 vasovagal, intubation, and death. Patient expressed understanding and all questions were answered. Patient is undecided and will discuss with his family.     MEDICATIONS  (STANDING):  acetaminophen     Tablet .. 975 milliGRAM(s) Oral every 8 hours  aspirin  chewable 81 milliGRAM(s) Oral daily  atorvastatin 80 milliGRAM(s) Oral at bedtime  chlorhexidine 2% Cloths 1 Application(s) Topical daily  clopidogrel Tablet 75 milliGRAM(s) Oral daily  enoxaparin Injectable 40 milliGRAM(s) SubCutaneous every 24 hours  influenza  Vaccine (HIGH DOSE) 0.5 milliLiter(s) IntraMuscular once  lidocaine   4% Patch 1 Patch Transdermal daily    MEDICATIONS  (PRN):  ibuprofen  Tablet. 600 milliGRAM(s) Oral every 8 hours PRN Moderate Pain (4 - 6)  melatonin 3 milliGRAM(s) Oral at bedtime PRN Insomnia      Vital Signs Last 24 Hrs  T(C): 36.8 (27 Dec 2024 09:15), Max: 37.1 (27 Dec 2024 00:00)  T(F): 98.3 (27 Dec 2024 09:15), Max: 98.7 (27 Dec 2024 00:00)  HR: 79 (27 Dec 2024 09:15) (61 - 80)  BP: 151/86 (27 Dec 2024 09:15) (139/80 - 158/94)  BP(mean): --  RR: 18 (27 Dec 2024 09:15) (18 - 18)  SpO2: 98% (27 Dec 2024 09:15) (98% - 100%)    Parameters below as of 27 Dec 2024 09:15  Patient On (Oxygen Delivery Method): room air      I&O's Detail    26 Dec 2024 07:01  -  27 Dec 2024 07:00  --------------------------------------------------------  IN:  Total IN: 0 mL    OUT:    Voided (mL): 1200 mL  Total OUT: 1200 mL    Total NET: -1200 mL      Physical Exam:  GENERAL: Lying in bed, well appearing, speaking in full sentence, in NAD  HEART: S1S2 RRR  PULMONARY: CTABL, normal respiratory effort    ABDOMEN: + Bowel sounds present, soft, NDNT  EXTREMITIES:  Warm, well -perfused, no pedal edema, distal pulses present  NEUROLOGICAL:AOx3     TELEMETERIC: SR HR 60-80BPM                            14.6   8.73  )-----------( 289      ( 27 Dec 2024 06:39 )             42.1     PT/INR - ( 27 Dec 2024 06:39 )   PT: 12.2 sec;   INR: 1.05 ratio         PTT - ( 27 Dec 2024 06:39 )  PTT:33.7 sec  12-25    136  |  96[L]  |  13  ----------------------------<  109[H]  3.7   |  24  |  1.03    Ca    9.9      25 Dec 2024 17:00  Phos  3.1     12-27  Mg     1.70     12-27    TPro  8.6[H]  /  Alb  4.2  /  TBili  0.5  /  DBili  x   /  AST  20  /  ALT  17  /  AlkPhos  65  12-25          < from: CT Angio Head w/ IV Cont (12.25.24 @ 18:26) >    IMPRESSION:    CT HEAD:  1. Subtle decreased attenuation appreciated within a right pontine   location. Cannot exclude an ischemic event in this location. Consider   further evaluation via MR imaging to include DWI and ADC mapping   techniques, provided there are no contraindications.  2. No abnormal intraparenchymal or leptomeningeal enhancement.    CTA NECK:  1. Proximal occlusion of the left vertebral artery with distal   reconstitution at the level of C2-C3..  2. Right vertebral artery is unremarkable in appearance.  3. Mild stenosis of approximately 33% at the proximal aspect of the right   internal carotid artery with their is deposition of soft and calcified   plaque.  4. Deposition of soft and calcified plaque along the proximal aspect of   the left internal carotid artery, without significant stenosis.    CTA HEAD:  1. No large vessel occlusion.  2. Moderate short segment stenosis involving the M1 segment of the left   middle cerebral artery.  3. Short segment moderate stenosis involving the right P2 segment.  4. Right P1 segment is hypoplastic. There is relative fetal origin of the   right posterior cerebral artery.  5. Short segment moderate stenosis involving the midsegment of the   basilar artery.  6. No aneurysm identified. Tiny aneurysms can be beyond the resolution of  CTA technique.    < end of copied text >  < from: MR Head No Cont (12.27.24 @ 08:41) >   Acute right-sided pontine infarct.    < end of copied text >      < from: TTE W or WO Ultrasound Enhancing Agent (12.26.24 @ 12:36) >  CONCLUSIONS:      1. Technically difficult image quality.   2. The left ventricular cavity is normal in size. Left ventricular wall thickness is mildly increased. Left ventricular systolic function is normal with a calculated ejection fraction of 74 % by the Quach's biplane method of disks. There are no regional wall motion abnormalities seen. Mild left ventricular hypertrophy. There is increased LV mass and concentric hypertrophy. There is mild (grade 1) left ventricular diastolic dysfunction.   3. The right ventricle is not well visualized.   4. Structurally normal mitral valve with normal leaflet excursion. There is calcification of the mitral valve annulus. There is trace mitral regurgitation.   5. The aorticvalve appears trileaflet with normal systolic excursion. There is calcification of the aortic valve leaflets. There is mild aortic regurgitation.   6. Agitated saline injection reveals bubbles in the left heart, but is inconclusive for the presence of an intracardial shunt.    < end of copied text >

## 2024-12-27 NOTE — H&P ADULT - NSHPREVIEWOFSYSTEMS_GEN_ALL_CORE
found to have acute pontine infarct. L sided weakness unchanged.    REVIEW OF SYSTEMS  Constitutional - No fever, No weight loss, No fatigue  HEENT - No eye pain, No visual disturbances, No difficulty hearing, No tinnitus, No vertigo, No neck pain  Respiratory - No cough, No wheezing, No shortness of breath  Cardiovascular - No chest pain, No palpitations  Gastrointestinal - No abdominal pain, No nausea, No vomiting, No diarrhea, No constipation  Genitourinary - No dysuria, No frequency, No hematuria, No incontinence  Neurological - No headaches, No memory loss, + loss of strength, No numbness, No tremors  Skin - No itching, No rashes, No lesions   Musculoskeletal - No joint pain, No joint swelling, No muscle pain  Psychiatric - No depression, No anxiety

## 2024-12-27 NOTE — DISCHARGE NOTE NURSING/CASE MANAGEMENT/SOCIAL WORK - FINANCIAL ASSISTANCE
[facility] provides services at a reduced cost to those who are determined to be eligible through [facility]’s financial assistance program. Information regarding [facility]’s financial assistance program can be found by going to [email or web address] or by calling [phone].

## 2024-12-27 NOTE — H&P ADULT - NSHPLABSRESULTS_GEN_ALL_CORE
LAB                        14.6   8.73  )-----------( 289      ( 27 Dec 2024 06:39 )             42.1     12-25    136  |  96[L]  |  13  ----------------------------<  109[H]  3.7   |  24  |  1.03    Ca    9.9      25 Dec 2024 17:00  Phos  3.1     12-27  Mg     1.70     12-27    TPro  8.6[H]  /  Alb  4.2  /  TBili  0.5  /  DBili  x   /  AST  20  /  ALT  17  /  AlkPhos  65  12-25    LIVER FUNCTIONS - ( 25 Dec 2024 17:00 )  Alb: 4.2 g/dL / Pro: 8.6 g/dL / ALK PHOS: 65 U/L / ALT: 17 U/L / AST: 20 U/L / GGT: x           PT/INR - ( 27 Dec 2024 06:39 )   PT: 12.2 sec;   INR: 1.05 ratio         PTT - ( 27 Dec 2024 06:39 )  PTT:33.7 sec      Urinalysis Basic - ( 25 Dec 2024 17:00 )    Color: x / Appearance: x / SG: x / pH: x  Gluc: 109 mg/dL / Ketone: x  / Bili: x / Urobili: x   Blood: x / Protein: x / Nitrite: x   Leuk Esterase: x / RBC: x / WBC x   Sq Epi: x / Non Sq Epi: x / Bacteria: x        RADs  < from: MR Head No Cont (12.27.24 @ 08:41) >    IMPRESSION: Acute right-sided pontine infarct.    < end of copied text >    < from: CT Angio Head w/ IV Cont (12.25.24 @ 18:26) >    CT HEAD:  1. Subtle decreased attenuation appreciated within a right pontine   location. Cannot exclude an ischemic event in this location. Consider   further evaluation via MR imaging to include DWI and ADC mapping   techniques, provided there are no contraindications.  2. No abnormal intraparenchymal or leptomeningeal enhancement.    CTA NECK:  1. Proximal occlusion of the left vertebral artery with distal   reconstitution at the level of C2-C3..  2. Right vertebral artery is unremarkable in appearance.  3. Mild stenosis of approximately 33% at the proximal aspect of the right   internal carotid artery with their is deposition of soft and calcified   plaque.  4. Deposition of soft and calcified plaque along the proximal aspect of   the left internal carotid artery, without significant stenosis.    CTA HEAD:  1. No large vessel occlusion.  2. Moderate short segment stenosis involving the M1 segment of the left   middle cerebral artery.  3. Short segment moderate stenosis involving the right P2 segment.  4. Right P1 segment is hypoplastic. There is relative fetal origin of the   right posterior cerebral artery.  5. Short segment moderate stenosis involving the midsegment of the   basilar artery.  6. No aneurysm identified. Tiny aneurysms can be beyond the resolution of  CTA technique.    < end of copied text >    < from: Xray Ribs 2 Views, Left (12.25.24 @ 17:32) >    IMPRESSION:  Minimally displaced fracture at the angle of the left 10th rib with no   pneumothorax or other acute finding.    < end of copied text >

## 2024-12-27 NOTE — H&P ADULT - ATTENDING COMMENTS
I have written Hisory/ ROS/ PE / and amended plan I have written Hisory/ ROS/ PE / and amended plan      Imp: Patient with diagnosis of right pontine CVA admitted for comprehensive acute rehabilitation.    PHYSICAL EXAM:    General - NAD, alert, follows commands  Heart- pulse regular  Lungs- breathing comfortably without respiratory distress  Abd- no visible abdominal distension   Ext- No calf pain, extremities well perfused  Neuro- follows commands. Right hemiplegia. Speech is clear.     Plan:  - Chart reviewed  - Continue comprehensive rehabilitation program. Patient requires active and ongoing therapeutic interventions of multiple disciplines and can tolerate 3h/d of therapies. Can actively participate and benefit from an intensive rehabilitation program. Requires supervision of a rehabilitation physician and a coordinated interdisciplinary approach to providing rehabilitation.   - DVT prophylaxis- Lovenox  - Skin- Turn q2h, check skin daily  - Continue current medications  - Medical issues: as above    HTN - BP labile. continue amlodipine. Monitor daily and in therapy  - Labs reviewed: Elevated Cr. Repeat BMP in AM to trend. Encourage PO fluids  - Discussed with resident on call and interdisciplinary team

## 2024-12-27 NOTE — DISCHARGE NOTE PROVIDER - NSDCMRMEDTOKEN_GEN_ALL_CORE_FT
acetaminophen 325 mg oral tablet: 3 tab(s) orally every 8 hours Last dose to be taken 12/30/2024  amLODIPine 10 mg oral tablet: 1 tab(s) orally once a day  aspirin 81 mg oral tablet, chewable: 1 tab(s) orally once a day  atorvastatin 80 mg oral tablet: 1 tab(s) orally once a day (at bedtime)  clopidogrel 75 mg oral tablet: 1 tab(s) orally once a day  lidocaine 4% topical film: Apply topically to affected area every 24 hours apply for 12 hours to affected area. remove for 12 hours prior to placing another patch.  melatonin 3 mg oral tablet: 1 tab(s) orally once a day (at bedtime) As needed Insomnia

## 2024-12-27 NOTE — DISCHARGE NOTE PROVIDER - HOSPITAL COURSE
Rigo Da Silva is a 69-year-old male with a past medical history of HTN presenting with a one day history of left sided weakness and a ground level fall concerning for an acute ischemic stroke and found to have a rib fracture. Code stroke was called and neurology consulted. NIHSS 4, LKW 12/23 2200    # Right pontine stroke.   Patient presenting with acute onset left sided weakness and a CT head with hypoattenuation in the right pontine location concerning for an acute ischemic stroke. Presented >24 hours from symptom onset. Patient without history of HLD, but has family history of stroke in 30s  - s/p aspirin and plavix loading dose  - Neurology consulted, recs appreciated  - MRI brain: Acute right-sided pontine infarct  - TTE: EF 74%. Mild LVH. Increased LV mass/concentric hypertrophy. Mild (grade 1) LV diastolic dysfunction. Trace MR. Mild AI. Saline injection reveals bubbles in the L heart, but is inconclusive for presence of an intracardial shunt  - C/w aspirin 81 mg daily/Plavix 75 mg daily x3 months/atorvastatin 80 mg  - Large right pontine stroke likely 2/2 intracranial athero per Neurology  - Consider extended cardiac monitoring per Neurology, EP consulted, apprec recs, pt is undecided, will discuss with his family  - ILR to be done as outpatient if pt is agreeable  - Aspiration precautions, S&S eval --> Continue Regular Solids with Thin Liquids  - Telemetry monitoring  - , A1C 5.3, TSH WNL  - Can follow up with Neurology, Dr. Marquise Fraser at [phone]    # Acute weakness.   Acute weakness likely 2/2 right pontine stroke  - Fall precautions  - PT/OT --> Acute Rehab  - PM&R: anticipate acute inpatient rehab when medically cleared  - Stroke management as above.    # Fall from ground level.   Feel from ground level attributed to weakness. Less concern for cardiac etiology or syncope without lightheadedness, palpitations, loss of consciousness.  - telemetry monitoring  - Fall precautions  - PT/OT --> Acute Rehab  - PM&R: acute inpatient rehab    # Rib fracture.   CXR/Rib X-Ray with minimally displaced fracture seen at the angle of the left 10th rib  - pain control: lidocaine patches, acetaminophen ATC and ibuprofen prn  - Saturating well on RA.    # Hypertension.   On amlodipine 10mg QD as outpatient  - Gradual normotension <130/80 per Neurology recs  - Monitor BP, ok to resume amlodipine on discharge    PT/OT/PM&R: anticipate acute inpatient rehab  Medically stable for discharge to Acute Rehab with PMD, Neurology and EP f/up as outpatient.

## 2024-12-27 NOTE — DISCHARGE NOTE NURSING/CASE MANAGEMENT/SOCIAL WORK - NURSING SECTION COMPLETE
Internal Medicine Progress note       Patient: Naveed Hill Date:5/10/2017     : 1985 Attending: Hiram Fitzpatrick MD   32 year old female 2017          CC: endocarditis, post surgery          Recent Events/Subjective:     Pain controlled  Tolerating po diet, no abdominal complaints  On RA, no SOB  Participated in therapies today   No other complaints                Vital Last Value 24 Hour Range   Temperature 99 °F (37.2 °C) Temp  Min: 97.6 °F (36.4 °C)  Max: 99 °F (37.2 °C)   Pulse 70 Pulse  Min: 61  Max: 71   Respiratory 18 Resp  Min: 18  Max: 20   Blood Pressure 105/53 BP  Min: 105/53  Max: 123/77   Arterial /73 No Data Recorded   O2 Sat 2 L/min NA   Pulse Oximetry 93 % SpO2  Min: 93 %  Max: 96 %     Vital Today Admitted   Weight 66.2 kg Weight: 72 kg   BMI N/A BMI (Calculated): 25.67          Last I/O 3 shifts  I/O last 3 completed shifts:  In: 625.3 [P.O.:500; I.V.:125.3]  Out: 2700 [Urine:2700]    Physical Exam:     General appearance: NAD, chronic ill appearance   Head: Normocephalic,  atraumatic   Eyes: PERRL, no drainage  Lungs:   diminished,clear  Heart: RRR, , S1, S2 normal   Abdomen: soft, NT/ND, bs present  Extremities:  B/L BKAs, RLE with immobilizer and dressing CDI, Left BKA, staples intact  Skin: As above   Neurologic:a Yossi, non-focal     Labs    Recent Labs  Lab 17  0505 17  0515 17  0550   WBC 10.7 8.8  --    HGB 8.1* 7.2*  --    HCT 26.8* 23.7*  --     299  --    SEG 74 68  --    SODIUM 143  --  138   POTASSIUM 5.1  --  4.4   CHLORIDE 107  --  102   CO2 21  --  25   ANIONGAP 20  --  15   BUN 20  --  21*   CREATININE 1.74*  --  1.77*   GFRNA 38  --  38   GFRA 45  --  44   GLUCOSE 101*  --  87   CALCIUM 8.1*  --  8.0*   ALBUMIN  --   --  2.0*   AST  --   --  21   GPT  --   --  8   ALKPT  --   --  79   BILIRUBIN  --   --  0.8         Medications/Infusions:       Scheduled:   • morphine SR  15 mg Oral 2 times per day   • ceftaroline (TEFLARO) IVPB  400 mg  Intravenous 3 times per day   • famotidine  20 mg Oral Daily   • carvedilol  6.25 mg Oral BID WC   • magnesium lactate  168 mg Oral Daily with breakfast   • iron polysaccharide complex  150 mg Oral BID WC   • sodium chloride (PF)  2 mL Injection 2 times per day   • rifAMPin  600 mg Oral Daily   • furosemide  40 mg Oral Daily   • pregabalin  150 mg Oral TID   • aspirin  81 mg Oral Daily   • docusate sodium-sennosides  2 tablet Oral Daily   • sodium chloride (PF)  10 mL Injection 3 times per day       Continuous Infusions:   • sodium chloride 0.9% infusion 75 mL/hr at 05/05/17 1228     .         Radiology/Imaging: reviewed              Assessment:     S/p redo MVR/TVR for MRSA endocarditis  Acute LE ishemia secondary to septic emboli s/p extensive embolectomy with Bilateral LE ihchemia-BKA now  Right UE digit ischemia   MRSA bacteremia   Renal, splenic, hepatic emboli   Thrombocytopenia-sepsis/ endocarditis related-better   Ongoing substance abuse-+ cocaine in urine  Chronic pain Sx  AV block s/p epicardial pacemaker placement        Plan & Recommendations:     Pain control per pain service  Monitor incision sites  Rehab efforts   Abx per ID  - noted end date of abx 5/29   EP follow up - replete electrolytes prn  Antiplatelets    Follow HGB   IPR evaluation          Patient/Caregiver provided printed discharge information.

## 2024-12-27 NOTE — H&P ADULT - ASSESSMENT
Rigo Da Silva is a 69-year-old male with a past medical history of HTN presenting with a one day history of left sided weakness and a ground level fall concerning for an acute ischemic stroke c/b rib fracture.     Admitted to [place] acute inpatient rehab on 12/27 for ADL, gait, and functional impairments.     # Right pontine stroke  - had fall on 12/23 2/2 to weakness  - went to ED on 12/25  - MRI showed R pontine stroke, likely 2/2 to atherosclerosis  - Comprehensive Multidisciplinary Rehab Program: 3 hours a day, 5 days a week with PT/OT/SLP     P&O as needed    #HTN  - home med amlodipine  - can resume amlodipine 10 outpatient      # Mood/Cognition:    # Sleep:  - Melatonin qHS    # Pain Management:  - has rib fx  - Tylenol PRN  - lidocaine patch prn  - ibuprofen prn    # GI/Bowel:  - Senna QHS, Miralax daily PRN  - GI ppx:    # /Bladder:   - Bladder scan x1 on admission, SC PRN  - Encourage timed voids every 4 hours while awake       # Skin/Pressure Injury:  - Skin assessment on admission: ***  - Monitor Incisions: ***  - Turn every 2 hours while in bed    # Diet:   - Diet Consistency/Modifications: ***  - Aspiration Precautions  - SLP consult for swallow function evaluation and treatment    # DVT ppx:  - ***  - SCDs  - Last Doppler on ***    # Restrictions/Precautions:  - Weight bearing status: ****  - ROM restrictions: ***  - Precautions:    ---------------  Outpatient Follow-up:    Marquise Fraser)  Neurology  [address] [facility], [address]  [place] [address]  Phone: [phone]  Fax: [phone]  Follow Up Time:     Your Primary Care Provider,     Demetri Lee  Cardiac Electrophysiology  [address] [place] [address], [address]  [place] [address]  Phone: [phone]  Fax: [phone]  Follow Up Time:  --------------    Goals: Safe discharge to home  Estimated Length of Stay: 10-14 days  Rehab Potential: Good  Medical Prognosis: Good  Estimated Disposition: Home with Home Care  ---------------    PRESCREEN COMPARISON:  I have reviewed the prescreen information and I have found no relevant changes between the preadmission screening and my post admission evaluation.    RATIONALE FOR INPATIENT ADMISSION: Patient demonstrates the following:  [X] Medically appropriate for rehabilitation admission  [X] Has attainable rehab goals with an appropriate initial discharge plan  [X]Has rehabilitation potential (expected to make a significant improvement within a reasonable period of time)  [X] Requires close medical management by a rehab physician, rehab nursing care, Hospitalist and comprehensive interdisciplinary team (including PT, OT and/or SLP, Prosthetics and Orthotics)   Rigo Da Silva is a 69-year-old male with a past medical history of HTN presenting with a one day history of left sided weakness and a ground level fall concerning for an acute ischemic stroke c/b rib fracture.     Admitted to [facility] acute inpatient rehab on 12/27 for ADL, gait, and functional impairments.     # Right pontine stroke  - had fall on 12/23 2/2 to weakness  - went to ED on 12/25  - MRI showed R pontine stroke, likely 2/2 to atherosclerosis  - Comprehensive Multidisciplinary Rehab Program: 3 hours a day, 5 days a week with PT/OT/SLP  - will need AFO for left foot drop   -ambulate with kari walker     #HTN  - home med amlodipine  - can resume amlodipine 10 outpatient      # Mood/Cognition: monitor for adjustment to disability     # Sleep:  - encourage quiet at night, monitor for insomnia     # Pain Management:  - has rib fx  - Tylenol PRN  - lidocaine patch prn  - ibuprofen prn    # GI/Bowel:  - Senna QHS, Miralax daily PRN  - GI ppx:    # /Bladder:   - Bladder scan x1 on admission, SC PRN  - Encourage timed voids every 4 hours while awake       # Skin/Pressure Injury:  - Skin assessment on admission: no pressure injury       # Diet:   - Diet Consistency/Modifications: regular with thins   - Aspiration Precautions  - SLP consult for swallow function evaluation and treatment    # DVT ppx:  - ***  - SCDs  - Last Doppler on ***    # Restrictions/Precautions:  - Weight bearing status: ****  - ROM restrictions: ***  - Precautions:    ---------------  Outpatient Follow-up:    Marquise Fraser)  Neurology  [address] [facility], [address]  [place] [address]  Phone: [phone]  Fax: [phone]  Follow Up Time:     Your Primary Care Provider,     Demetri Lee  Cardiac Electrophysiology  [address] [place] [address], [address]  [place] [address]  Phone: [phone]  Fax: [phone]  Follow Up Time:  --------------    Goals: Safe discharge to home  Estimated Length of Stay: 10-14 days  Rehab Potential: Good  Medical Prognosis: Good  Estimated Disposition: Home with Home Care  ---------------    PRESCREEN COMPARISON:  I have reviewed the prescreen information and I have found no relevant changes between the preadmission screening and my post admission evaluation.    RATIONALE FOR INPATIENT ADMISSION: Patient demonstrates the following:  [X] Medically appropriate for rehabilitation admission  [X] Has attainable rehab goals with an appropriate initial discharge plan  [X]Has rehabilitation potential (expected to make a significant improvement within a reasonable period of time)  [X] Requires close medical management by a rehab physician, rehab nursing care, Hospitalist and comprehensive interdisciplinary team (including PT, OT and/or SLP, Prosthetics and Orthotics)   Rgio Da Silva is a 69-year-old male with a past medical history of HTN presenting with a one day history of left sided weakness and a ground level fall concerning for an acute ischemic stroke c/b rib fracture.     Admitted to [place] acute inpatient rehab on 12/27 for ADL, gait, and functional impairments.     # Right pontine stroke  - had fall on 12/23 2/2 to weakness  - went to ED on 12/25  - MRI showed R pontine stroke, likely 2/2 to atherosclerosis  - Comprehensive Multidisciplinary Rehab Program: 3 hours a day, 5 days a week with PT/OT/SLP  - will need AFO for left foot drop   -ambulate with kari walker     #HTN  - home med amlodipine  - can resume amlodipine 10 outpatient      # Mood/Cognition: monitor for adjustment to disability     # Sleep:  - encourage quiet at night, monitor for insomnia     # Pain Management:  - has rib fx  - Tylenol PRN  - lidocaine patch prn  - ibuprofen prn    # GI/Bowel:  - Senna QHS, Miralax daily PRN  - GI ppx:    # /Bladder:   - Bladder scan x1 on admission, SC PRN  - Encourage timed voids every 4 hours while awake       # Skin/Pressure Injury:  - Skin assessment on admission: no pressure injury       # Diet:   - Diet Consistency/Modifications: regular with thins   - Aspiration Precautions  - SLP consult for swallow function evaluation and treatment    # DVT ppx:  Lovenox 40 mg SQ     # Restrictions/Precautions:    - Precautions: Falls     ---------------  Outpatient Follow-up:    Marquise Fraser)  Neurology  [address] [facility], [address]  [place] [address]  Phone: [phone]  Fax: [phone]  Follow Up Time:     Your Primary Care Provider,     Demetri Lee  Cardiac Electrophysiology  [address] [place] [address], [address]  [place] [address]  Phone: [phone]  Fax: [phone]  Follow Up Time:  --------------    Goals: Safe discharge to home  Estimated Length of Stay: 10-14 days  Rehab Potential: Good  Medical Prognosis: Good  Estimated Disposition: Home with Home Care  ---------------    PRESCREEN COMPARISON:  I have reviewed the prescreen information and I have found no relevant changes between the preadmission screening and my post admission evaluation.    RATIONALE FOR INPATIENT ADMISSION: Patient demonstrates the following:  [X] Medically appropriate for rehabilitation admission  [X] Has attainable rehab goals with an appropriate initial discharge plan  [X]Has rehabilitation potential (expected to make a significant improvement within a reasonable period of time)  [X] Requires close medical management by a rehab physician, rehab nursing care, Hospitalist and comprehensive interdisciplinary team (including PT, OT and/or SLP, Prosthetics and Orthotics)   Rigo Da Silva is a 69-year-old male with a past medical history of HTN presenting with a one day history of left sided weakness and a ground level fall concerning for an acute ischemic stroke c/b rib fracture.     Admitted to [facility] acute inpatient rehab on 12/27 for ADL, gait, and functional impairments.     # Right pontine stroke  - had fall on 12/23 2/2 to weakness  - went to ED on 12/25  - MRI showed R pontine stroke, likely 2/2 to atherosclerosis  - Comprehensive Multidisciplinary Rehab Program: 3 hours a day, 5 days a week with PT/OT/SLP  - will need AFO for left foot drop   -ambulate with kari walker     #HTN  - home med amlodipine  - can resume amlodipine 10 outpatient    # Mood/Cognition: monitor for adjustment to disability     # Sleep:  - encourage quiet at night, monitor for insomnia   - PRN 3mg melatonin    # Pain Management:  - has rib fx  - Tylenol PRN  - lidocaine patch prn  - ibuprofen prn    # GI/Bowel:  - Senna QHS, Miralax daily PRN  - GI ppx:    # /Bladder:   - Bladder scan x1 on admission, SC PRN  - Encourage timed voids every 4 hours while awake       # Skin/Pressure Injury:  - Skin assessment on admission: no pressure injury       # Diet:   - Diet Consistency/Modifications: regular with thins   - Aspiration Precautions  - SLP consult for swallow function evaluation and treatment    # DVT ppx:  Lovenox 40 mg SQ     # Restrictions/Precautions:    - Precautions: Falls     ---------------  Outpatient Follow-up:    Marquise Fraser)  Neurology  [address] [facility], [address]  [place] [address]  Phone: [phone]  Fax: [phone]  Follow Up Time:     Your Primary Care Provider,     Demetri Lee  Cardiac Electrophysiology  [address] [place] [address], [address]  [place] [address]  Phone: [phone]  Fax: [phone]  Follow Up Time:  --------------    Goals: Safe discharge to home  Estimated Length of Stay: 10-14 days  Rehab Potential: Good  Medical Prognosis: Good  Estimated Disposition: Home with Home Care  ---------------    PRESCREEN COMPARISON:  I have reviewed the prescreen information and I have found no relevant changes between the preadmission screening and my post admission evaluation.    RATIONALE FOR INPATIENT ADMISSION: Patient demonstrates the following:  [X] Medically appropriate for rehabilitation admission  [X] Has attainable rehab goals with an appropriate initial discharge plan  [X]Has rehabilitation potential (expected to make a significant improvement within a reasonable period of time)  [X] Requires close medical management by a rehab physician, rehab nursing care, Hospitalist and comprehensive interdisciplinary team (including PT, OT and/or SLP, Prosthetics and Orthotics)

## 2024-12-27 NOTE — DISCHARGE NOTE PROVIDER - NSFOLLOWUPCLINICS_GEN_ALL_ED_FT
[facility] General Internal Medicine  General Internal Medicine  [address] [place] [address]  Phone: [phone]  Fax:

## 2024-12-27 NOTE — H&P ADULT - NSHPSOCIALHISTORY_GEN_ALL_CORE
SOCIAL HISTORY  Smoking - none    FUNCTIONAL HISTORY      Previous Functional Status:  Independent in ambulation, ADL's, transfers prior to hospitalization    Current Functional Status:  Bed mobility: min assist  Transfers: min assist  Gait / ambulation: min assist  ADL's: min to mod assist  Speech: 12/26 bedside swallow-  Continue Regular Solids with Thin Liquids

## 2024-12-27 NOTE — DISCHARGE NOTE PROVIDER - ATTENDING DISCHARGE PHYSICAL EXAMINATION:
Seen by me this afternoon, doing well, no complaints, tolerating PO, weakness on left side is unchanged  Vital Signs Last 24 Hrs  T(C): 36.2 (27 Dec 2024 13:15), Max: 37.1 (27 Dec 2024 00:00)  T(F): 97.2 (27 Dec 2024 13:15), Max: 98.7 (27 Dec 2024 00:00)  HR: 97 (27 Dec 2024 13:15) (61 - 97)  BP: 156/85 (27 Dec 2024 13:15) (139/80 - 158/94)  RR: 18 (27 Dec 2024 13:15) (18 - 18)  SpO2: 97% (27 Dec 2024 13:15) (97% - 100%)  Well appearing, NAD, fluent speech  HEENT:  Nonicteric, PERRLA  CV:  RRR, no murmur, no JVD  Lungs:  CTA B/L, no wheezes, rales, rhonchi  Abdomen:  Soft, non-tender, no distended, positive BS, no hepatosplenomegaly  Extremities:  2+ pulses, no c/c, no edema  Skin:  Warm and dry, no rashes  Neuro:  AAOx3, Strenght LLE 3+/5, LUE 2+/5

## 2024-12-27 NOTE — H&P ADULT - NSICDXFAMILYHX_GEN_ALL_CORE_FT
FAMILY HISTORY:  Mother  Still living? No  Known health problems: none, Age at diagnosis: Age Unknown    Child  Still living? Unknown  FH: stroke, Age at diagnosis: 31-40

## 2024-12-28 LAB
ALBUMIN SERPL ELPH-MCNC: 3.2 G/DL — LOW (ref 3.3–5)
ALP SERPL-CCNC: 63 U/L — SIGNIFICANT CHANGE UP (ref 40–120)
ALT FLD-CCNC: 23 U/L — SIGNIFICANT CHANGE UP (ref 10–45)
ANION GAP SERPL CALC-SCNC: 5 MMOL/L — SIGNIFICANT CHANGE UP (ref 5–17)
AST SERPL-CCNC: 25 U/L — SIGNIFICANT CHANGE UP (ref 10–40)
BASOPHILS # BLD AUTO: 0.03 K/UL — SIGNIFICANT CHANGE UP (ref 0–0.2)
BASOPHILS NFR BLD AUTO: 0.3 % — SIGNIFICANT CHANGE UP (ref 0–2)
BILIRUB SERPL-MCNC: 0.6 MG/DL — SIGNIFICANT CHANGE UP (ref 0.2–1.2)
BUN SERPL-MCNC: 22 MG/DL — SIGNIFICANT CHANGE UP (ref 7–23)
CALCIUM SERPL-MCNC: 9.3 MG/DL — SIGNIFICANT CHANGE UP (ref 8.4–10.5)
CHLORIDE SERPL-SCNC: 104 MMOL/L — SIGNIFICANT CHANGE UP (ref 96–108)
CO2 SERPL-SCNC: 33 MMOL/L — HIGH (ref 22–31)
CREAT SERPL-MCNC: 1.33 MG/DL — HIGH (ref 0.5–1.3)
EGFR: 58 ML/MIN/1.73M2 — LOW
EOSINOPHIL # BLD AUTO: 0.06 K/UL — SIGNIFICANT CHANGE UP (ref 0–0.5)
EOSINOPHIL NFR BLD AUTO: 0.6 % — SIGNIFICANT CHANGE UP (ref 0–6)
GLUCOSE SERPL-MCNC: 116 MG/DL — HIGH (ref 70–99)
HCT VFR BLD CALC: 41.1 % — SIGNIFICANT CHANGE UP (ref 39–50)
HGB BLD-MCNC: 14 G/DL — SIGNIFICANT CHANGE UP (ref 13–17)
IMM GRANULOCYTES NFR BLD AUTO: 0.5 % — SIGNIFICANT CHANGE UP (ref 0–0.9)
LYMPHOCYTES # BLD AUTO: 2.99 K/UL — SIGNIFICANT CHANGE UP (ref 1–3.3)
LYMPHOCYTES # BLD AUTO: 30.6 % — SIGNIFICANT CHANGE UP (ref 13–44)
MCHC RBC-ENTMCNC: 29.2 PG — SIGNIFICANT CHANGE UP (ref 27–34)
MCHC RBC-ENTMCNC: 34.1 G/DL — SIGNIFICANT CHANGE UP (ref 32–36)
MCV RBC AUTO: 85.6 FL — SIGNIFICANT CHANGE UP (ref 80–100)
MONOCYTES # BLD AUTO: 0.91 K/UL — HIGH (ref 0–0.9)
MONOCYTES NFR BLD AUTO: 9.3 % — SIGNIFICANT CHANGE UP (ref 2–14)
NEUTROPHILS # BLD AUTO: 5.73 K/UL — SIGNIFICANT CHANGE UP (ref 1.8–7.4)
NEUTROPHILS NFR BLD AUTO: 58.7 % — SIGNIFICANT CHANGE UP (ref 43–77)
NRBC # BLD: 0 /100 WBCS — SIGNIFICANT CHANGE UP (ref 0–0)
PLATELET # BLD AUTO: 285 K/UL — SIGNIFICANT CHANGE UP (ref 150–400)
POTASSIUM SERPL-MCNC: 3.6 MMOL/L — SIGNIFICANT CHANGE UP (ref 3.5–5.3)
POTASSIUM SERPL-SCNC: 3.6 MMOL/L — SIGNIFICANT CHANGE UP (ref 3.5–5.3)
PROT SERPL-MCNC: 7.8 G/DL — SIGNIFICANT CHANGE UP (ref 6–8.3)
RBC # BLD: 4.8 M/UL — SIGNIFICANT CHANGE UP (ref 4.2–5.8)
RBC # FLD: 11.4 % — SIGNIFICANT CHANGE UP (ref 10.3–14.5)
SODIUM SERPL-SCNC: 142 MMOL/L — SIGNIFICANT CHANGE UP (ref 135–145)
WBC # BLD: 9.77 K/UL — SIGNIFICANT CHANGE UP (ref 3.8–10.5)
WBC # FLD AUTO: 9.77 K/UL — SIGNIFICANT CHANGE UP (ref 3.8–10.5)

## 2024-12-28 PROCEDURE — 99223 1ST HOSP IP/OBS HIGH 75: CPT

## 2024-12-28 PROCEDURE — 99232 SBSQ HOSP IP/OBS MODERATE 35: CPT | Mod: GC

## 2024-12-28 RX ORDER — ACETAMINOPHEN 80 MG/.8ML
650 SOLUTION/ DROPS ORAL EVERY 6 HOURS
Refills: 0 | Status: DISCONTINUED | OUTPATIENT
Start: 2024-12-28 | End: 2025-01-10

## 2024-12-28 RX ORDER — POLYETHYLENE GLYCOL 3350 17 G/DOSE
17 POWDER (GRAM) ORAL DAILY
Refills: 0 | Status: DISCONTINUED | OUTPATIENT
Start: 2024-12-28 | End: 2025-01-10

## 2024-12-28 RX ADMIN — Medication 3 MILLIGRAM(S): at 20:25

## 2024-12-28 RX ADMIN — Medication 5 MILLIGRAM(S): at 11:57

## 2024-12-28 RX ADMIN — Medication 81 MILLIGRAM(S): at 11:56

## 2024-12-28 RX ADMIN — ACETAMINOPHEN 650 MILLIGRAM(S): 80 SOLUTION/ DROPS ORAL at 20:25

## 2024-12-28 RX ADMIN — LIDOCAINE 1 PATCH: 50 OINTMENT TOPICAL at 11:57

## 2024-12-28 RX ADMIN — ATORVASTATIN CALCIUM 80 MILLIGRAM(S): 40 TABLET, FILM COATED ORAL at 21:46

## 2024-12-28 RX ADMIN — ACETAMINOPHEN 650 MILLIGRAM(S): 80 SOLUTION/ DROPS ORAL at 21:25

## 2024-12-28 RX ADMIN — LIDOCAINE 1 PATCH: 50 OINTMENT TOPICAL at 19:21

## 2024-12-28 RX ADMIN — ENOXAPARIN SODIUM 40 MILLIGRAM(S): 60 INJECTION INTRAVENOUS; SUBCUTANEOUS at 05:00

## 2024-12-28 RX ADMIN — CLOPIDOGREL BISULFATE 75 MILLIGRAM(S): 75 TABLET, FILM COATED ORAL at 11:57

## 2024-12-28 RX ADMIN — LIDOCAINE 1 PATCH: 50 OINTMENT TOPICAL at 23:44

## 2024-12-28 NOTE — CONSULT NOTE ADULT - SUBJECTIVE AND OBJECTIVE BOX
HPI:   Rigo Da Silva is a 69-year-old male with a past medical history of HTN presenting with a one day history of left sided weakness and a ground level fall concerning for an acute ischemic stroke and found to have a rib fracture. Code stroke was called and neurology consulted. NIHSS 4, LKW 12/23 2200. CT head with hypoattenuation in the right pontine location concerning for an acute ischemic stroke. Presented >24 hours from symptom onset. Likely 2/2 to intracranial atherosclerosis per neurology. PT/OT/PM&R evaluated patient and rec  acute inpatient rehab. Patient was optimized for discharge on 12/27 to [facility].     patient seen and examined at bedside. no complaints    PAST MEDICAL & SURGICAL HISTORY:  HTN (hypertension)      Unilateral inguinal hernia, without obstruction or gangrene, not specified as recurrent      H/O hernia repair  right      History of surgery on arm  fx with metal          Review of Systems:   CONSTITUTIONAL: No fever, weight loss, or fatigue  EYES: No eye pain, visual disturbances, or discharge  ENMT:  No difficulty hearing, tinnitus, vertigo; No sinus or throat pain  NECK: No pain or stiffness  RESPIRATORY: No cough, wheezing, chills or hemoptysis; No shortness of breath  CARDIOVASCULAR: No chest pain, palpitations, dizziness, or leg swelling  GASTROINTESTINAL: No abdominal or epigastric pain. No nausea, vomiting, or hematemesis; No diarrhea or constipation. No melena or hematochezia.  GENITOURINARY: No dysuria, frequency, hematuria, or incontinence  NEUROLOGICAL:  +loss of strength, No headaches, memory loss, numbness, or tremors  SKIN: No itching, burning, rashes, or lesions   LYMPH NODES: No enlarged glands  ENDOCRINE: No heat or cold intolerance; No hair loss  MUSCULOSKELETAL: No joint pain or swelling; No muscle, back, or extremity pain  HEME/LYMPH: No easy bruising, or bleeding gums  ALLERY AND IMMUNOLOGIC: No hives or eczema    Allergies    No Known Allergies    Intolerances        Social History:     FAMILY HISTORY:  Known health problems: none (Mother)    FH: stroke (Child)        MEDICATIONS  (STANDING):  amLODIPine   Tablet 5 milliGRAM(s) Oral daily  aspirin  chewable 81 milliGRAM(s) Oral daily  atorvastatin 80 milliGRAM(s) Oral at bedtime  clopidogrel Tablet 75 milliGRAM(s) Oral daily  enoxaparin Injectable 40 milliGRAM(s) SubCutaneous every 24 hours  lidocaine   4% Patch 1 Patch Transdermal daily  polyethylene glycol 3350 17 Gram(s) Oral daily    MEDICATIONS  (PRN):  acetaminophen     Tablet .. 650 milliGRAM(s) Oral every 6 hours PRN Mild Pain (1 - 3)  melatonin 3 milliGRAM(s) Oral at bedtime PRN Insomnia      Vital Signs Last 24 Hrs  T(C): 37.1 (28 Dec 2024 20:20), Max: 37.1 (28 Dec 2024 20:20)  T(F): 98.8 (28 Dec 2024 20:20), Max: 98.8 (28 Dec 2024 20:20)  HR: 80 (28 Dec 2024 20:20) (66 - 80)  BP: 154/86 (28 Dec 2024 20:20) (148/80 - 157/80)  BP(mean): --  RR: 16 (28 Dec 2024 20:20) (14 - 16)  SpO2: 96% (28 Dec 2024 20:20) (93% - 96%)    Parameters below as of 28 Dec 2024 20:20  Patient On (Oxygen Delivery Method): room air      CAPILLARY BLOOD GLUCOSE            PHYSICAL EXAM:  GENERAL: NAD  HEAD:  Atraumatic, Normocephalic  EYES: EOMI, conjunctiva and sclera clear  NECK: Supple, No JVD  CHEST/LUNG: Clear to auscultation bilaterally; No wheeze  HEART: Regular rate and rhythm; No murmurs, rubs, or gallops  ABDOMEN: Soft, Nontender, Nondistended; Bowel sounds present  EXTREMITIES:  2+ Peripheral Pulses, No clubbing, cyanosis, or edema  NEUROLOGY: weakness in LUE and LLE, 1-2/5 diffusely  SKIN: No rashes or lesions    LABS:                        14.0   9.77  )-----------( 285      ( 28 Dec 2024 06:00 )             41.1     12-28    142  |  104  |  22  ----------------------------<  116[H]  3.6   |  33[H]  |  1.33[H]    Ca    9.3      28 Dec 2024 06:00  Phos  3.1     12-27  Mg     1.70     12-27    TPro  7.8  /  Alb  3.2[L]  /  TBili  0.6  /  DBili  x   /  AST  25  /  ALT  23  /  AlkPhos  63  12-28    PT/INR - ( 27 Dec 2024 06:39 )   PT: 12.2 sec;   INR: 1.05 ratio         PTT - ( 27 Dec 2024 06:39 )  PTT:33.7 sec      Urinalysis Basic - ( 28 Dec 2024 06:00 )    Color: x / Appearance: x / SG: x / pH: x  Gluc: 116 mg/dL / Ketone: x  / Bili: x / Urobili: x   Blood: x / Protein: x / Nitrite: x   Leuk Esterase: x / RBC: x / WBC x   Sq Epi: x / Non Sq Epi: x / Bacteria: x        EKG(personally reviewed):    RADIOLOGY & ADDITIONAL TESTS:    Imaging Personally Reviewed:    Consultant(s) Notes Reviewed:      Care Discussed with Consultants/Other Providers:

## 2024-12-28 NOTE — DIETITIAN INITIAL EVALUATION ADULT - OTHER INFO
Occasional constipation - on bowel regimen, will provide prunes daily.   Recommended Ensure Max 11oz PO Daily (Provides 150kcal & 30grams of Protein) to optimize protein/calorie intake.

## 2024-12-28 NOTE — DIETITIAN INITIAL EVALUATION ADULT - ADD RECOMMEND
No 1. Recommended Ensure Max 11oz PO Daily (Provides 150kcal & 30grams of Protein)   2. RD to remain available.

## 2024-12-28 NOTE — DIETITIAN INITIAL EVALUATION ADULT - PERTINENT LABORATORY DATA
12-28    142  |  104  |  22  ----------------------------<  116[H]  3.6   |  33[H]  |  1.33[H]    Ca    9.3      28 Dec 2024 06:00  Phos  3.1     12-27  Mg     1.70     12-27    TPro  7.8  /  Alb  3.2[L]  /  TBili  0.6  /  DBili  x   /  AST  25  /  ALT  23  /  AlkPhos  63  12-28  A1C with Estimated Average Glucose Result: 5.3 % (12-26-24 @ 07:06)

## 2024-12-28 NOTE — DIETITIAN INITIAL EVALUATION ADULT - PERTINENT MEDS FT
pt c/o painful urination for 2 days  seen by mali  given antibiotic , continues with burning MEDICATIONS  (STANDING):  amLODIPine   Tablet 5 milliGRAM(s) Oral daily  aspirin  chewable 81 milliGRAM(s) Oral daily  atorvastatin 80 milliGRAM(s) Oral at bedtime  clopidogrel Tablet 75 milliGRAM(s) Oral daily  enoxaparin Injectable 40 milliGRAM(s) SubCutaneous every 24 hours  lidocaine   4% Patch 1 Patch Transdermal daily    MEDICATIONS  (PRN):  acetaminophen     Tablet .. 650 milliGRAM(s) Oral every 6 hours PRN Mild Pain (1 - 3)  melatonin 3 milliGRAM(s) Oral at bedtime PRN Insomnia

## 2024-12-29 PROCEDURE — 99232 SBSQ HOSP IP/OBS MODERATE 35: CPT | Mod: GC

## 2024-12-29 PROCEDURE — 99232 SBSQ HOSP IP/OBS MODERATE 35: CPT

## 2024-12-29 RX ADMIN — ACETAMINOPHEN 650 MILLIGRAM(S): 80 SOLUTION/ DROPS ORAL at 17:17

## 2024-12-29 RX ADMIN — LIDOCAINE 1 PATCH: 50 OINTMENT TOPICAL at 19:36

## 2024-12-29 RX ADMIN — Medication 5 MILLIGRAM(S): at 05:21

## 2024-12-29 RX ADMIN — CLOPIDOGREL BISULFATE 75 MILLIGRAM(S): 75 TABLET, FILM COATED ORAL at 11:24

## 2024-12-29 RX ADMIN — Medication 81 MILLIGRAM(S): at 11:24

## 2024-12-29 RX ADMIN — ATORVASTATIN CALCIUM 80 MILLIGRAM(S): 40 TABLET, FILM COATED ORAL at 21:09

## 2024-12-29 RX ADMIN — ACETAMINOPHEN 650 MILLIGRAM(S): 80 SOLUTION/ DROPS ORAL at 16:17

## 2024-12-29 RX ADMIN — LIDOCAINE 1 PATCH: 50 OINTMENT TOPICAL at 22:54

## 2024-12-29 RX ADMIN — LIDOCAINE 1 PATCH: 50 OINTMENT TOPICAL at 11:24

## 2024-12-29 RX ADMIN — Medication 17 GRAM(S): at 11:24

## 2024-12-29 RX ADMIN — ENOXAPARIN SODIUM 40 MILLIGRAM(S): 60 INJECTION INTRAVENOUS; SUBCUTANEOUS at 05:21

## 2024-12-29 NOTE — PROGRESS NOTE ADULT - SUBJECTIVE AND OBJECTIVE BOX
Patient is a 69y old  Male who presents with a chief complaint of Right pontine stroke. (28 Dec 2024 21:10)    ---------------------------------------------------------------------  SUBJECTIVE:  Seen and evaluated at bedside this AM. No acute issues overnight. Rib pain stable but still present. Observed eating breakfast.    Other ROS:  Denies: headache, CP, SOB, pain, bowel or bladder issues  ----------------------------------------------------------------------  PHYSICAL EXAM:    Vital Signs Last 24 Hrs  T(C): 37.1 (29 Dec 2024 07:54), Max: 37.1 (28 Dec 2024 20:20)  T(F): 98.8 (29 Dec 2024 07:54), Max: 98.8 (28 Dec 2024 20:20)  HR: 71 (29 Dec 2024 07:54) (66 - 80)  BP: 151/82 (29 Dec 2024 07:54) (145/83 - 157/80)  BP(mean): --  RR: 16 (29 Dec 2024 07:54) (16 - 16)  SpO2: 95% (29 Dec 2024 07:54) (93% - 96%)    Parameters below as of 29 Dec 2024 07:54  Patient On (Oxygen Delivery Method): room air      Daily     Daily       Constitutional - NAD, Comfortable  HEENT - NCAT, EOMI  Neck - Supple, No limited ROM  Chest - no respiratory distress  Cardiovascular - RRR,  Abdomen - BS+, Soft, NTND  Extremities - No C/C/E, No calf tenderness   Neurologic Exam -                    Cognitive - Awake, Alert, AAO to self, place, date, year, situation     Communication - Fluent, + dysarthria     Cranial Nerves - CN 2-12 intact     Motor -                     LEFT    UE - ShAB  1/5, EF 2/5, EE 2/5, WE 0/5,  0/5                    RIGHT UE - ShAB 5/5, EF 5/5, EE 5/5, WE 5/5,  5/5                    LEFT    LE - HF 2/5, KE 2/5, DF 0/5, PF 0/5                    RIGHT LE - HF 5/5, KE 5/5, DF 5/5, PF 5/5        Sensory - Intact to LT      OculoVestibular - No saccades, No nystagmus, VOR         Balance - WNL Static  Psychiatric - Mood stable, Affect WNL    ----------------------------------------------------------------------  RECENT LABS:                        14.0   9.77  )-----------( 285      ( 28 Dec 2024 06:00 )             41.1     12-28    142  |  104  |  22  ----------------------------<  116[H]  3.6   |  33[H]  |  1.33[H]    Ca    9.3      28 Dec 2024 06:00    TPro  7.8  /  Alb  3.2[L]  /  TBili  0.6  /  DBili  x   /  AST  25  /  ALT  23  /  AlkPhos  63  12-28    LIVER FUNCTIONS - ( 28 Dec 2024 06:00 )  Alb: 3.2 g/dL / Pro: 7.8 g/dL / ALK PHOS: 63 U/L / ALT: 23 U/L / AST: 25 U/L / GGT: x             Urinalysis Basic - ( 28 Dec 2024 06:00 )    Color: x / Appearance: x / SG: x / pH: x  Gluc: 116 mg/dL / Ketone: x  / Bili: x / Urobili: x   Blood: x / Protein: x / Nitrite: x   Leuk Esterase: x / RBC: x / WBC x   Sq Epi: x / Non Sq Epi: x / Bacteria: x      CAPILLARY BLOOD GLUCOSE        ----------------------------------------------------------------------  RECENT IMAGING:    ***  ----------------------------------------------------------------------  MEDICATIONS:  MEDICATIONS  (STANDING):  amLODIPine   Tablet 5 milliGRAM(s) Oral daily  aspirin  chewable 81 milliGRAM(s) Oral daily  atorvastatin 80 milliGRAM(s) Oral at bedtime  clopidogrel Tablet 75 milliGRAM(s) Oral daily  enoxaparin Injectable 40 milliGRAM(s) SubCutaneous every 24 hours  lidocaine   4% Patch 1 Patch Transdermal daily  polyethylene glycol 3350 17 Gram(s) Oral daily    MEDICATIONS  (PRN):  acetaminophen     Tablet .. 650 milliGRAM(s) Oral every 6 hours PRN Mild Pain (1 - 3)  melatonin 3 milliGRAM(s) Oral at bedtime PRN Insomnia    ----------------------------------------------------------------------

## 2024-12-29 NOTE — PROGRESS NOTE ADULT - ASSESSMENT
Rigo Da Silva is a 69-year-old male with a past medical history of HTN presenting with a one day history of left sided weakness and a ground level fall concerning for an acute ischemic stroke c/b rib fracture.     Admitted to [facility] acute inpatient [facility] on 12/27 for ADL, gait, and functional impairments.     # Right pontine stroke  - had fall on 12/23 2/2 to weakness  - went to ED on 12/25  - MRI showed R pontine stroke, likely 2/2 to atherosclerosis  - Comprehensive Multidisciplinary Rehab Program: 3 hours a day, 5 days a week with PT/OT/SLP  - will need AFO for left foot drop   -ambulate with kari walker     #HTN  - home med amlodipine  - can resume amlodipine 10 outpatient    # Mood/Cognition: monitor for adjustment to disability     # Sleep:  - encourage quiet at night, monitor for insomnia   - PRN 3mg melatonin    # Pain Management:  - has rib fx  - Tylenol PRN  - lidocaine patch prn  - ibuprofen prn    # GI/Bowel:  - Senna QHS, Miralax daily PRN  - GI ppx:    # /Bladder:   - Bladder scan x1 on admission, SC PRN  - Encourage timed voids every 4 hours while awake       # Skin/Pressure Injury:  - Skin assessment on admission: no pressure injury       # Diet:   - Diet Consistency/Modifications: regular with thins   - Aspiration Precautions  - SLP consult for swallow function evaluation and treatment    # DVT ppx:  Lovenox 40 mg SQ     # Restrictions/Precautions:    - Precautions: Falls     ------------------------------------  - Chart reviewed  - Continue comprehensive rehabilitation program. Patient requires active and ongoing therapeutic interventions of multiple disciplines and can tolerate 3h/d of therapies. Can actively participate and benefit from an intensive rehabilitation program. Requires supervision of a rehabilitation physician and a coordinated interdisciplinary approach to providing rehabilitation.   - DVT prophylaxis- Lovenox  - Skin- Turn q2h, check skin daily  - Continue current medications  - Medical issues: as above    HTN - BP labile. continue amlodipine. Monitor daily and in therapy  - Labs reviewed: Elevated Cr. Repeat BMP in AM to trend. Encourage PO fluids  - Discussed with resident on call and interdisciplinary team.

## 2024-12-29 NOTE — PROGRESS NOTE ADULT - SUBJECTIVE AND OBJECTIVE BOX
Patient seen and examined at bedside. no complaints.      ALLERGIES:  No Known Allergies    MEDICATIONS  (STANDING):  amLODIPine   Tablet 5 milliGRAM(s) Oral daily  aspirin  chewable 81 milliGRAM(s) Oral daily  atorvastatin 80 milliGRAM(s) Oral at bedtime  clopidogrel Tablet 75 milliGRAM(s) Oral daily  enoxaparin Injectable 40 milliGRAM(s) SubCutaneous every 24 hours  lidocaine   4% Patch 1 Patch Transdermal daily  polyethylene glycol 3350 17 Gram(s) Oral daily    MEDICATIONS  (PRN):  acetaminophen     Tablet .. 650 milliGRAM(s) Oral every 6 hours PRN Mild Pain (1 - 3)  melatonin 3 milliGRAM(s) Oral at bedtime PRN Insomnia    Vital Signs Last 24 Hrs  T(F): 98.8 (29 Dec 2024 07:54), Max: 98.8 (28 Dec 2024 20:20)  HR: 71 (29 Dec 2024 07:54) (66 - 80)  BP: 151/82 (29 Dec 2024 07:54) (145/83 - 157/80)  RR: 16 (29 Dec 2024 07:54) (16 - 16)  SpO2: 95% (29 Dec 2024 07:54) (93% - 96%)  I&O's Summary    28 Dec 2024 07:01  -  29 Dec 2024 07:00  --------------------------------------------------------  IN: 800 mL / OUT: 0 mL / NET: 800 mL      BMI (kg/m2): 35.4 (12-27-24 @ 20:39), 24.9 (12-25-24 @ 22:40)  PHYSICAL EXAM:    Gen: nad, resting in bed  Neuro: aaox3  Heent: no jvd, no oral exudates  Pulm: cta b/l, no w/r/r  CV: +s1s2, no m/r/g  Ab: soft, nt/nd, normoactive bs x 4  Extrem: no edema, pulses intact and equal  Skin: no rashes  Psych: normal    LABS:                        14.0   9.77  )-----------( 285      ( 28 Dec 2024 06:00 )             41.1       12-28    142  |  104  |  22  ----------------------------<  116  3.6   |  33  |  1.33    Ca    9.3      28 Dec 2024 06:00  Phos  3.1     12-27  Mg     1.70     12-27    TPro  7.8  /  Alb  3.2  /  TBili  0.6  /  DBili  x   /  AST  25  /  ALT  23  /  AlkPhos  63  12-28       PT/INR - ( 27 Dec 2024 06:39 )   PT: 12.2 sec;   INR: 1.05 ratio         PTT - ( 27 Dec 2024 06:39 )  PTT:33.7 sec         12-26 Chol 193 mg/dL LDL -- HDL 48 mg/dL Trig 100 mg/dL          Urinalysis Basic - ( 28 Dec 2024 06:00 )    Color: x / Appearance: x / SG: x / pH: x  Gluc: 116 mg/dL / Ketone: x  / Bili: x / Urobili: x   Blood: x / Protein: x / Nitrite: x   Leuk Esterase: x / RBC: x / WBC x   Sq Epi: x / Non Sq Epi: x / Bacteria: x        COVID-19 PCR: NotDetec (12-27-24 @ 22:20)      RADIOLOGY & ADDITIONAL TESTS:    Care Discussed with Consultants/Other Providers:

## 2024-12-29 NOTE — PROGRESS NOTE ADULT - ASSESSMENT
Rigo Da Silva is a 69-year-old male with a past medical history of HTN presenting with a one day history of left sided weakness and a ground level fall concerning for an acute ischemic stroke c/b rib fracture.     # Right pontine stroke  - had fall on 12/23 2/2 to weakness  - went to ED on 12/25  - MRI showed R pontine stroke, likely 2/2 to atherosclerosis  - Comprehensive Multidisciplinary Rehab   - AFO for left foot drop   - ambulate with kari walker  - aspirin, plavix and high intensity statin    #rib fx  -pain management with lidocaine patch and tylenol  -further changes per rehab    #HTN  - home med amlodipine 10  - currently on 5 mg. if SBP sustains above 160 mmHg, would increase to 10 mg    #other:  -miralax    modified consistency diet, SLP consult  Lovenox 40 mg SQ

## 2024-12-30 LAB
ALBUMIN SERPL ELPH-MCNC: 3.1 G/DL — LOW (ref 3.3–5)
ALP SERPL-CCNC: 68 U/L — SIGNIFICANT CHANGE UP (ref 40–120)
ALT FLD-CCNC: 41 U/L — SIGNIFICANT CHANGE UP (ref 10–45)
ANION GAP SERPL CALC-SCNC: 10 MMOL/L — SIGNIFICANT CHANGE UP (ref 5–17)
AST SERPL-CCNC: 30 U/L — SIGNIFICANT CHANGE UP (ref 10–40)
BILIRUB SERPL-MCNC: 0.8 MG/DL — SIGNIFICANT CHANGE UP (ref 0.2–1.2)
BUN SERPL-MCNC: 20 MG/DL — SIGNIFICANT CHANGE UP (ref 7–23)
CALCIUM SERPL-MCNC: 9.3 MG/DL — SIGNIFICANT CHANGE UP (ref 8.4–10.5)
CHLORIDE SERPL-SCNC: 102 MMOL/L — SIGNIFICANT CHANGE UP (ref 96–108)
CO2 SERPL-SCNC: 28 MMOL/L — SIGNIFICANT CHANGE UP (ref 22–31)
CREAT SERPL-MCNC: 1.17 MG/DL — SIGNIFICANT CHANGE UP (ref 0.5–1.3)
EGFR: 68 ML/MIN/1.73M2 — SIGNIFICANT CHANGE UP
GLUCOSE SERPL-MCNC: 116 MG/DL — HIGH (ref 70–99)
HCT VFR BLD CALC: 39.6 % — SIGNIFICANT CHANGE UP (ref 39–50)
HGB BLD-MCNC: 13.7 G/DL — SIGNIFICANT CHANGE UP (ref 13–17)
MCHC RBC-ENTMCNC: 29.6 PG — SIGNIFICANT CHANGE UP (ref 27–34)
MCHC RBC-ENTMCNC: 34.6 G/DL — SIGNIFICANT CHANGE UP (ref 32–36)
MCV RBC AUTO: 85.5 FL — SIGNIFICANT CHANGE UP (ref 80–100)
NRBC # BLD: 0 /100 WBCS — SIGNIFICANT CHANGE UP (ref 0–0)
PLATELET # BLD AUTO: 286 K/UL — SIGNIFICANT CHANGE UP (ref 150–400)
POTASSIUM SERPL-MCNC: 3.8 MMOL/L — SIGNIFICANT CHANGE UP (ref 3.5–5.3)
POTASSIUM SERPL-SCNC: 3.8 MMOL/L — SIGNIFICANT CHANGE UP (ref 3.5–5.3)
PROT SERPL-MCNC: 7.8 G/DL — SIGNIFICANT CHANGE UP (ref 6–8.3)
RBC # BLD: 4.63 M/UL — SIGNIFICANT CHANGE UP (ref 4.2–5.8)
RBC # FLD: 11.8 % — SIGNIFICANT CHANGE UP (ref 10.3–14.5)
SODIUM SERPL-SCNC: 140 MMOL/L — SIGNIFICANT CHANGE UP (ref 135–145)
WBC # BLD: 10.93 K/UL — HIGH (ref 3.8–10.5)
WBC # FLD AUTO: 10.93 K/UL — HIGH (ref 3.8–10.5)

## 2024-12-30 PROCEDURE — 99232 SBSQ HOSP IP/OBS MODERATE 35: CPT

## 2024-12-30 RX ADMIN — Medication 5 MILLIGRAM(S): at 05:44

## 2024-12-30 RX ADMIN — LIDOCAINE 1 PATCH: 50 OINTMENT TOPICAL at 23:41

## 2024-12-30 RX ADMIN — CLOPIDOGREL BISULFATE 75 MILLIGRAM(S): 75 TABLET, FILM COATED ORAL at 12:12

## 2024-12-30 RX ADMIN — LIDOCAINE 1 PATCH: 50 OINTMENT TOPICAL at 12:11

## 2024-12-30 RX ADMIN — ENOXAPARIN SODIUM 40 MILLIGRAM(S): 60 INJECTION INTRAVENOUS; SUBCUTANEOUS at 05:44

## 2024-12-30 RX ADMIN — ATORVASTATIN CALCIUM 80 MILLIGRAM(S): 40 TABLET, FILM COATED ORAL at 21:28

## 2024-12-30 RX ADMIN — Medication 81 MILLIGRAM(S): at 12:12

## 2024-12-30 RX ADMIN — Medication 17 GRAM(S): at 12:12

## 2024-12-30 RX ADMIN — LIDOCAINE 1 PATCH: 50 OINTMENT TOPICAL at 19:51

## 2024-12-30 NOTE — PROGRESS NOTE ADULT - SUBJECTIVE AND OBJECTIVE BOX
Patient is a 69y old  Male who presents with a chief complaint of Right pontine stroke. (29 Dec 2024 12:09)      HPI:  Rigo Da Silva is a 69-year-old male with a past medical history of HTN presenting with a one day history of left sided weakness and a ground level fall concerning for an acute ischemic stroke and found to have a rib fracture. Code stroke was called and neurology consulted. NIHSS 4, LKW 12/23 2200. CT head with hypoattenuation in the right pontine location concerning for an acute ischemic stroke. Presented >24 hours from symptom onset. Likely 2/2 to intracranial atherosclerosis per neurology. PT/OT/PM&R evaluated patient and rec  acute inpatient rehab. Patient was optimized for discharge on 12/27 to [facility].    (27 Dec 2024 16:35)      PAST MEDICAL & SURGICAL HISTORY:  HTN (hypertension)      Unilateral inguinal hernia, without obstruction or gangrene, not specified as recurrent      H/O hernia repair  right      History of surgery on arm  fx with metal          MEDICATIONS  (STANDING):  amLODIPine   Tablet 5 milliGRAM(s) Oral daily  aspirin  chewable 81 milliGRAM(s) Oral daily  atorvastatin 80 milliGRAM(s) Oral at bedtime  clopidogrel Tablet 75 milliGRAM(s) Oral daily  enoxaparin Injectable 40 milliGRAM(s) SubCutaneous every 24 hours  lidocaine   4% Patch 1 Patch Transdermal daily  polyethylene glycol 3350 17 Gram(s) Oral daily    MEDICATIONS  (PRN):  acetaminophen     Tablet .. 650 milliGRAM(s) Oral every 6 hours PRN Mild Pain (1 - 3)  melatonin 3 milliGRAM(s) Oral at bedtime PRN Insomnia      Allergies    No Known Allergies    Intolerances          VITALS  69y  Vital Signs Last 24 Hrs  T(C): 36.7 (30 Dec 2024 07:00), Max: 37.1 (29 Dec 2024 21:10)  T(F): 98.1 (30 Dec 2024 07:00), Max: 98.8 (29 Dec 2024 21:10)  HR: 85 (30 Dec 2024 07:00) (70 - 85)  BP: 145/78 (30 Dec 2024 07:00) (140/70 - 152/80)  BP(mean): --  RR: 17 (30 Dec 2024 07:00) (16 - 17)  SpO2: 93% (30 Dec 2024 07:00) (93% - 95%)    Parameters below as of 30 Dec 2024 07:00  Patient On (Oxygen Delivery Method): room air      Daily     Daily         RECENT LABS:                          13.7   10.93 )-----------( 286      ( 30 Dec 2024 06:03 )             39.6     12-30    140  |  102  |  20  ----------------------------<  116[H]  3.8   |  28  |  1.17    Ca    9.3      30 Dec 2024 06:03    TPro  7.8  /  Alb  3.1[L]  /  TBili  0.8  /  DBili  x   /  AST  30  /  ALT  41  /  AlkPhos  68  12-30    LIVER FUNCTIONS - ( 30 Dec 2024 06:03 )  Alb: 3.1 g/dL / Pro: 7.8 g/dL / ALK PHOS: 68 U/L / ALT: 41 U/L / AST: 30 U/L / GGT: x             Urinalysis Basic - ( 30 Dec 2024 06:03 )    Color: x / Appearance: x / SG: x / pH: x  Gluc: 116 mg/dL / Ketone: x  / Bili: x / Urobili: x   Blood: x / Protein: x / Nitrite: x   Leuk Esterase: x / RBC: x / WBC x   Sq Epi: x / Non Sq Epi: x / Bacteria: x          CAPILLARY BLOOD GLUCOSE

## 2024-12-30 NOTE — PROGRESS NOTE ADULT - ASSESSMENT
Rigo Da Silva is a 69-year-old male with a past medical history of HTN presenting with a one day history of left sided weakness and a ground level fall concerning for an acute ischemic stroke c/b rib fracture.     # Right pontine stroke  - Comprehensive Multidisciplinary Rehab   - AFO for left foot drop   - ambulate with kari walker  - aspirin, plavix and high intensity statin    #rib fx  -pain management per primary team    #HTN  - amlodipine 5mg po daily      labs reviewed 12/30

## 2024-12-30 NOTE — PROGRESS NOTE ADULT - SUBJECTIVE AND OBJECTIVE BOX
no acute events overnight    PHYSICAL EXAM:    Vital Signs Last 24 Hrs  T(F): 98.1 (30 Dec 2024 07:00), Max: 98.8 (29 Dec 2024 21:10)  HR: 85 (30 Dec 2024 07:00) (70 - 85)  BP: 145/78 (30 Dec 2024 07:00) (140/70 - 152/80)  RR: 17 (30 Dec 2024 07:00) (16 - 17)  SpO2: 93% (30 Dec 2024 07:00) (93% - 95%)  I&O's Summary    29 Dec 2024 07:01  -  30 Dec 2024 07:00  --------------------------------------------------------  IN: 480 mL / OUT: 400 mL / NET: 80 mL        GENERAL: NAD  HEENT: NCAT  CHEST/LUNG: No increased WOB, Clear to percussion bilaterally; No rales, rhonchi, wheezing  HEART: Regular rate and rhythm; No murmurs  ABDOMEN: Soft, Nontender, Nondistended; Bowel sounds present  MUSCULOSKELETAL/EXTREMITIES:  2+ Peripheral Pulses, No LE edema  PSYCH: Appropriate affect, Alert & Oriented    LABS:                        13.7   10.93 )-----------( 286      ( 30 Dec 2024 06:03 )             39.6       12-30    140  |  102  |  20  ----------------------------<  116  3.8   |  28  |  1.17    Ca    9.3      30 Dec 2024 06:03    TPro  7.8  /  Alb  3.1  /  TBili  0.8  /  DBili  x   /  AST  30  /  ALT  41  /  AlkPhos  68  12-30                12-26 Chol 193 mg/dL LDL -- HDL 48 mg/dL Trig 100 mg/dL                  Urinalysis Basic - ( 30 Dec 2024 06:03 )    Color: x / Appearance: x / SG: x / pH: x  Gluc: 116 mg/dL / Ketone: x  / Bili: x / Urobili: x   Blood: x / Protein: x / Nitrite: x   Leuk Esterase: x / RBC: x / WBC x   Sq Epi: x / Non Sq Epi: x / Bacteria: x        COVID-19 PCR: NotDetec (12-27-24 @ 22:20)      MEDICATIONS  (STANDING):  amLODIPine   Tablet 5 milliGRAM(s) Oral daily  aspirin  chewable 81 milliGRAM(s) Oral daily  atorvastatin 80 milliGRAM(s) Oral at bedtime  clopidogrel Tablet 75 milliGRAM(s) Oral daily  enoxaparin Injectable 40 milliGRAM(s) SubCutaneous every 24 hours  lidocaine   4% Patch 1 Patch Transdermal daily  polyethylene glycol 3350 17 Gram(s) Oral daily    MEDICATIONS  (PRN):  acetaminophen     Tablet .. 650 milliGRAM(s) Oral every 6 hours PRN Mild Pain (1 - 3)  melatonin 3 milliGRAM(s) Oral at bedtime PRN Insomnia      Care Discussed with Consultants/Other Providers: Yes

## 2024-12-30 NOTE — PROGRESS NOTE ADULT - TIME BILLING
- Ordering, reviewing, and interpreting labs, testing, and imaging.  - Independently obtaining a review of systems and performing a physical exam  - Reviewing prior hospitalization and where necessary, outpatient records.  - Counselling and educating patient and family regarding interpretation of aforementioned items and plan of care. Time spent includes direct patient care  (interview and examination of patient), discussion with other providers, support staff and/or patient's family members, review of medical records, imaging and lab results.

## 2024-12-30 NOTE — PROGRESS NOTE ADULT - ASSESSMENT
Rigo Da Silva is a 69-year-old male with a past medical history of HTN presenting with a one day history of left sided weakness and a ground level fall , found to have pontine CVA c/b rib fracture.     # Right pontine stroke  - MRI showed R pontine stroke, likely 2/2 to atherosclerosis  - Comprehensive Multidisciplinary Rehab Program: 3 hours a day, 5 days a week with PT/OT/SLP  - will need AFO for left foot drop   - Precautions: cardiac, fall, aspiration    # rib fractures  - Incentive spirometry  - pain mgt as below    # HTN  - home med amlodipine  - /70 - 152/80) 12/30    # Sleep:  - PRN 3mg melatonin    # Pain Management:  - has rib fx  - Tylenol PRN  - lidocaine patch prn  - ibuprofen prn    # GI/Bowel:  - Senna QHS, Miralax daily PRN  - GI ppx:    # /Bladder:   - Bladder scan x1 on admission, SC PRN  - Encourage timed voids every 4 hours while awake     # Diet:   - Diet Consistency/Modifications: regular with thins     # DVT ppx:  - Lovenox 40 mg SQ     # CAse discussed in IDT rounds 12/30 (initial)  -    - barriers:  - goals:  - target:     # LABS  CBC BMP 12/30 stable  CBC CMP 1/2    ---------------  Outpatient Follow-up:    Marquise Fraser)  Neurology  [address] [facility], [address]  [place] [address]  Phone: [phone]  Fax: [phone]  Follow Up Time:     Your Primary Care Provider,     Demetri Lee  Cardiac Electrophysiology  [address] [place] [address], [address]  [place] [address]  Phone: [phone]  Fax: [phone]  Follow Up Time:  --------------     Rigo Da Silva is a 69-year-old male with a past medical history of HTN presenting with a one day history of left sided weakness and a ground level fall , found to have pontine CVA c/b rib fracture.     # Right pontine stroke and left body involvement, dysarthria  - MRI showed R pontine stroke, likely 2/2 to atherosclerosis  - Comprehensive Multidisciplinary Rehab Program: 3 hours a day, 5 days a week with PT/OT/SLP  - will need AFO for left foot drop. LEFT PRAFO ordered for positioning and prevention contracture in bed  - sling for left shoulder subluxation/support during OOB activities, discussed with patient. FES to left shoulder, bicep in therapy  - Precautions: cardiac, fall, aspiration    # rib fractures  - Incentive spirometry  - pain mgt as below    # HTN  - home med amlodipine  - /70 - 152/80) 12/30    # Sleep:  - PRN 3mg melatonin    # Pain Management:  - has rib fx  - Tylenol PRN  - lidocaine patch prn  - ibuprofen prn    # GI/Bowel:  - Senna QHS, Miralax daily PRN  - GI ppx:    # /Bladder:   - Bladder scan x1 on admission, SC PRN. Voiding well, PVR <120 ml, dc bladder scan 12/30  - Encourage timed voids every 4 hours while awake     # Diet:   - Diet Consistency/Modifications: regular with thins     # DVT ppx:  - Lovenox 40 mg SQ     # CAse discussed in IDT rounds 12/30 (initial)  -    - barriers:  - goals:  - target:     # LABS  CBC BMP 12/30 stable  CBC CMP 1/2    ---------------  Outpatient Follow-up:    Marquise Fraser)  Neurology  [address] [facility], [address]  [place] [address]  Phone: [phone]  Fax: [phone]  Follow Up Time:     Your Primary Care Provider,     Demetri Lee  Cardiac Electrophysiology  [address] [place] [address], [address]  [place] [address]  Phone: [phone]  Fax: [phone]  Follow Up Time:  --------------     Rigo Da Silva is a 69-year-old male with a past medical history of HTN presenting with a one day history of left sided weakness and a ground level fall , found to have pontine CVA c/b rib fracture.     # Right pontine stroke and left body involvement, dysarthria  - MRI showed R pontine stroke, likely 2/2 to atherosclerosis  - Comprehensive Multidisciplinary Rehab Program: 3 hours a day, 5 days a week with PT/OT/SLP  - will need AFO for left foot drop. LEFT PRAFO ordered for positioning and prevention contracture in bed  - sling for left shoulder subluxation/support during OOB activities, discussed with patient. FES to left shoulder, bicep in therapy  - rec therapy  - Precautions: cardiac, fall, aspiration    # rib fractures  - Incentive spirometry  - pain mgt as below    # HTN  - home med amlodipine  - /70 - 152/80) 12/30    # Sleep:  - PRN 3mg melatonin    # Pain Management:  - has rib fx  - Tylenol PRN  - lidocaine patch prn  - ibuprofen prn    # GI/Bowel:  - Senna QHS, Miralax daily PRN  - GI ppx:    # /Bladder:   - Bladder scan x1 on admission, SC PRN. Voiding well, PVR <120 ml, dc bladder scan 12/30  - Encourage timed voids every 4 hours while awake     # Diet:   - Diet Consistency/Modifications: regular with thins     # DVT ppx:  - Lovenox 40 mg SQ     # CAse discussed in IDT rounds 12/30 (initial)  -    - barriers:  - goals:  - target:     # LABS  CBC BMP 12/30 stable  CBC CMP 1/2    ---------------  Outpatient Follow-up:    Marquise Fraser)  Neurology  [address] [facility], [address]  [place] [address]  Phone: [phone]  Fax: [phone]  Follow Up Time:     Your Primary Care Provider,     Demetri Lee  Cardiac Electrophysiology  [address] [place] [address], [address]  [place] [address]  Phone: [phone]  Fax: [phone]  Follow Up Time:  --------------     Rigo Da Silva is a 69-year-old male with a past medical history of HTN presenting with a one day history of left sided weakness and a ground level fall , found to have pontine CVA c/b rib fracture.     # Right pontine stroke and left body involvement, dysarthria  - MRI showed R pontine stroke, likely 2/2 to atherosclerosis  - Comprehensive Multidisciplinary Rehab Program: 3 hours a day, 5 days a week with PT/OT/SLP  - will need AFO for left foot drop. LEFT PRAFO ordered for positioning and prevention contracture in bed  - sling for left shoulder subluxation/support during OOB activities, discussed with patient. FES to left shoulder, bicep in therapy  - rec therapy  - Precautions: cardiac, fall, aspiration    # rib fractures  - Incentive spirometry  - pain mgt as below    # HTN  - home med amlodipine  - /70 - 152/80) 12/30    # Sleep:  - PRN 3mg melatonin    # Pain Management:  - has rib fx  - Tylenol PRN  - lidocaine patch prn  - ibuprofen prn    # GI/Bowel:  - Senna QHS, Miralax daily PRN  - GI ppx:    # /Bladder:   - Bladder scan x1 on admission, SC PRN. Voiding well, PVR <120 ml, dc bladder scan 12/30  - Encourage timed voids every 4 hours while awake     # Diet:   - Diet Consistency/Modifications: regular with thins     # DVT ppx:  - Lovenox 40 mg SQ     # Case discussed in IDT rounds 12/30 (initial)  - continent B/B, skin intact 2nd floor 2 family home +stairs, regular solid thin liquids, mild cognitive impairment in memory and executive function, mod assist bed mobility, max toileting hygiene, max foot wear, max toilet transfers, mod assist stand pivot, ambulates 10 feet with HR and WC follow  - barriers: lives alone, rib pain, steps, left HP, no support on dc, cognitive impairment  - goals: supervision transfers and ambulation household level with cane, 8-12 steps CS, Cs/supervision bADLs "to become independent and return back to work" "Remberto" Recall therapy recommendations independently, supervision transfers CS  - target: 1/10 to [facility]    # LABS  CBC BMP 12/30 stable  CBC CMP 1/2    ---------------  Outpatient Follow-up:    Marquise Fraser)  Neurology  [address] [facility], [address]  [place] [address]  Phone: [phone]  Fax: [phone]  Follow Up Time:     Your Primary Care Provider,     Demetri Lee  Cardiac Electrophysiology  [address] [place] [address], [address]  [place] [address]  Phone: [phone]  Fax: [phone]  Follow Up Time:  --------------

## 2024-12-30 NOTE — PROGRESS NOTE ADULT - MENTAL STATUS
recall 3/3 immediate 2/3 delayed  O  x 4, including name of hospital and date/month./year  difficulty with spelling "world" however may be related to ESL

## 2024-12-31 ENCOUNTER — TRANSCRIPTION ENCOUNTER (OUTPATIENT)
Age: 69
End: 2024-12-31

## 2024-12-31 PROCEDURE — 99231 SBSQ HOSP IP/OBS SF/LOW 25: CPT

## 2024-12-31 PROCEDURE — 99232 SBSQ HOSP IP/OBS MODERATE 35: CPT

## 2024-12-31 RX ORDER — POLYETHYLENE GLYCOL 3350 17 G/DOSE
17 POWDER (GRAM) ORAL
Qty: 0 | Refills: 0 | DISCHARGE
Start: 2024-12-31

## 2024-12-31 RX ORDER — ASPIRIN 81 MG
1 TABLET, DELAYED RELEASE (ENTERIC COATED) ORAL
Qty: 0 | Refills: 0 | DISCHARGE
Start: 2024-12-31

## 2024-12-31 RX ORDER — GINKGO BILOBA 40 MG
1 CAPSULE ORAL
Qty: 0 | Refills: 0 | DISCHARGE
Start: 2024-12-31

## 2024-12-31 RX ORDER — LIDOCAINE 50 MG/G
1 OINTMENT TOPICAL
Qty: 0 | Refills: 0 | DISCHARGE
Start: 2024-12-31

## 2024-12-31 RX ORDER — ATORVASTATIN CALCIUM 40 MG/1
1 TABLET, FILM COATED ORAL
Qty: 0 | Refills: 0 | DISCHARGE
Start: 2024-12-31

## 2024-12-31 RX ORDER — ACETAMINOPHEN 80 MG/.8ML
2 SOLUTION/ DROPS ORAL
Qty: 0 | Refills: 0 | DISCHARGE
Start: 2024-12-31

## 2024-12-31 RX ORDER — CLOPIDOGREL BISULFATE 75 MG/1
1 TABLET, FILM COATED ORAL
Qty: 0 | Refills: 0 | DISCHARGE
Start: 2024-12-31

## 2024-12-31 RX ADMIN — CLOPIDOGREL BISULFATE 75 MILLIGRAM(S): 75 TABLET, FILM COATED ORAL at 12:28

## 2024-12-31 RX ADMIN — Medication 5 MILLIGRAM(S): at 05:34

## 2024-12-31 RX ADMIN — ENOXAPARIN SODIUM 40 MILLIGRAM(S): 60 INJECTION INTRAVENOUS; SUBCUTANEOUS at 05:34

## 2024-12-31 RX ADMIN — Medication 17 GRAM(S): at 12:28

## 2024-12-31 RX ADMIN — ATORVASTATIN CALCIUM 80 MILLIGRAM(S): 40 TABLET, FILM COATED ORAL at 21:15

## 2024-12-31 RX ADMIN — Medication 81 MILLIGRAM(S): at 12:28

## 2024-12-31 RX ADMIN — LIDOCAINE 1 PATCH: 50 OINTMENT TOPICAL at 12:28

## 2024-12-31 RX ADMIN — LIDOCAINE 1 PATCH: 50 OINTMENT TOPICAL at 21:13

## 2024-12-31 NOTE — DISCHARGE NOTE PROVIDER - CARE PROVIDER_API CALL
Marquise Fraser)  Neurology  [address] [facility], [address]  [place] [address]  Phone: [phone]  Fax: [phone]  Follow Up Time: 2 weeks    Demetri Lee  Cardiac Electrophysiology  [address] [place] [address], [address]  [place] [address]  Phone: [phone]  Fax: [phone]  Follow Up Time: 2 weeks    Genesis Valadez  Physical/Rehab Medicine  [address] [place] [address]  Phone: [phone]  Fax: [phone]  Follow Up Time: 1 month

## 2024-12-31 NOTE — PROGRESS NOTE ADULT - TIME BILLING
Time spent includes direct patient care  (interview and examination of patient), discussion with other providers, support staff and/or patient's family members, review of medical records, imaging and lab results.

## 2024-12-31 NOTE — DISCHARGE NOTE PROVIDER - HOSPITAL COURSE
HPI:  Rgio Da Silva is a 69-year-old male with a past medical history of HTN presenting with a one day history of left sided weakness and a ground level fall concerning for an acute ischemic stroke and found to have a rib fracture. Code stroke was called and neurology consulted. NIHSS 4, LKW 12/23 2200. CT head with hypoattenuation in the right pontine location concerning for an acute ischemic stroke. Presented >24 hours from symptom onset. Likely 2/2 to intracranial atherosclerosis per neurology. PT/OT/PM&R evaluated patient and rec  acute inpatient rehab. Patient was optimized for discharge on 12/27 to [facility]. (27 Dec 2024 16:35)  Admitted with gait instability, ADL, and functional impairments.     Rehab course significant for ___.    All other medical co-morbidities were stable. Patient tolerated course of inpatient PT/OT/SLP rehab with significant improvements and met rehab goals prior to discharge. Patient was cleared on 1/10 for discharge to [facility]. HPI:  Rigo Da Silva is a 69-year-old male with a past medical history of HTN presenting with a one day history of left sided weakness and a ground level fall concerning for an acute ischemic stroke and found to have a rib fracture. Code stroke was called and neurology consulted. NIHSS 4, LKW 12/23 2200. CT head with hypoattenuation in the right pontine location concerning for an acute ischemic stroke. Presented >24 hours from symptom onset. Likely 2/2 to intracranial atherosclerosis per neurology. PT/OT/PM&R evaluated patient and rec  acute inpatient rehab. Patient was optimized for discharge on 12/27 to [facility]. (27 Dec 2024 16:35)  Admitted with gait instability, ADL, and functional impairments.     Rehab course significant for ___.    All other medical co-morbidities were stable. Patient tolerated course of inpatient PT/OT/SLP rehab with significant improvements and met rehab goals prior to discharge. Patient was cleared on 1/10 for discharge to [facility]. HPI:  Rigo Da Silva is a 69-year-old male with a past medical history of HTN presenting with a one day history of left sided weakness and a ground level fall concerning for an acute ischemic stroke and found to have a rib fracture. Code stroke was called and neurology consulted. NIHSS 4, LKW 12/23 2200. CT head with hypoattenuation in the right pontine location concerning for an acute ischemic stroke. Presented >24 hours from symptom onset. Likely 2/2 to intracranial atherosclerosis per neurology. PT/OT/PM&R evaluated patient and rec  acute inpatient rehab. Patient was optimized for discharge on 12/27 to [facility]. (27 Dec 2024 16:35)  Admitted with gait instability, ADL, and functional impairments.     Rehab course significant for Initiation of Prozac to aid in motor recovery. Pt will need AFO  evaluation at [facility] for Left foot drop.   Left rib pain 2/2 to fx, managed with lidocaine.     All other medical co-morbidities were stable. Patient tolerated course of inpatient PT/OT/SLP rehab with significant improvements and met rehab goals prior to discharge. Patient was cleared on 1/10 for discharge to [facility].

## 2024-12-31 NOTE — DISCHARGE NOTE PROVIDER - CARE PROVIDERS DIRECT ADDRESSES
,DirectAddress_Unknown,[email or web address]

## 2024-12-31 NOTE — PROGRESS NOTE ADULT - ASSESSMENT
Rgio Da Silva is a 69-year-old male with a past medical history of HTN presenting with a one day history of left sided weakness and a ground level fall , found to have pontine CVA c/b rib fracture.     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 FES to left shoulder, bicep in therapy  - rec therapy  - AFO evaluation at [facility] to allow for continued motor recovery  - Precautions: cardiac, fall, aspiration    # rib fractures  - Incentive spirometry  - pain mgt as below    # HTN  - amlodipine 5 mg daily  - BP (151/90 - 153/75) 12/31    # Sleep:  - PRN 3mg melatonin    # Pain Management:  - Tylenol PRN  - lidocaine patch prn  - ibuprofen prn    # GI/Bowel:  - Senna QHS  - Miralax daily PRN    # /Bladder:   - Voiding well    # Diet:   - regular with thins     # DVT ppx:  - Lovenox 40 mg SQ     # Case discussed in IDT rounds 12/30 (initial)  - continent B/B, skin intact 2nd floor 2 family home +stairs, regular solid thin liquids, mild cognitive impairment in memory and executive function, mod assist bed mobility, max toileting hygiene, max foot wear, max toilet transfers, mod assist stand pivot, ambulates 10 feet with HR and WC follow  - barriers: lives alone, rib pain, steps, left HP, no support on dc, cognitive impairment  - goals: supervision transfers and ambulation household level with cane, 8-12 steps CS, Cs/supervision bADLs "to become independent and return back to work" "Remberto" Recall therapy recommendations independently, supervision transfers CS  - target: 1/10 to [facility]    # LABS  CBC CMP 1/2    ---------------  Outpatient Follow-up:    Marquise Fraser)  Neurology  [address] [facility], [address]  [place] [address]  Phone: [phone]  Fax: [phone]  Follow Up Time:     Your Primary Care Provider,     Demetri Lee  Cardiac Electrophysiology  [address] [place] [address], [address]  [place] [address]  Phone: [phone]  Fax: [phone]  Follow Up Time:  --------------

## 2024-12-31 NOTE — PROGRESS NOTE ADULT - ASSESSMENT
Rigo Da Silva is a 69-year-old male with a past medical history of HTN presenting with a one day history of left sided weakness and a ground level fall concerning for an acute ischemic stroke c/b rib fracture.     # Right pontine stroke  - Comprehensive Multidisciplinary Rehab   - AFO for left foot drop   - ambulate with kari walker  - aspirin, plavix and high intensity statin    #rib fx  -pain management per primary team    #HTN  - amlodipine 5mg po daily

## 2024-12-31 NOTE — DISCHARGE NOTE PROVIDER - NSDCCPCAREPLAN_GEN_ALL_CORE_FT
PRINCIPAL DISCHARGE DIAGNOSIS  Diagnosis: Right pontine stroke  Assessment and Plan of Treatment: You had a stroke and came to rehab to get stronger. Please follow up with your neurosurgeon and Dr. Valadez outpatient.

## 2024-12-31 NOTE — PROGRESS NOTE ADULT - SUBJECTIVE AND OBJECTIVE BOX
Patient is a 69y old  Male who presents with a chief complaint of Right pontine stroke. (30 Dec 2024 14:32)      HPI:  Rigo Da Silva is a 69-year-old male with a past medical history of HTN presenting with a one day history of left sided weakness and a ground level fall concerning for an acute ischemic stroke and found to have a rib fracture. Code stroke was called and neurology consulted. NIHSS 4, LKW 12/23 2200. CT head with hypoattenuation in the right pontine location concerning for an acute ischemic stroke. Presented >24 hours from symptom onset. Likely 2/2 to intracranial atherosclerosis per neurology. PT/OT/PM&R evaluated patient and rec  acute inpatient rehab. Patient was optimized for discharge on 12/27 to [facility].    (27 Dec 2024 16:35)      PAST MEDICAL & SURGICAL HISTORY:  HTN (hypertension)      Unilateral inguinal hernia, without obstruction or gangrene, not specified as recurrent      H/O hernia repair  right      History of surgery on arm  fx with metal          MEDICATIONS  (STANDING):  amLODIPine   Tablet 5 milliGRAM(s) Oral daily  aspirin  chewable 81 milliGRAM(s) Oral daily  atorvastatin 80 milliGRAM(s) Oral at bedtime  clopidogrel Tablet 75 milliGRAM(s) Oral daily  enoxaparin Injectable 40 milliGRAM(s) SubCutaneous every 24 hours  lidocaine   4% Patch 1 Patch Transdermal daily  polyethylene glycol 3350 17 Gram(s) Oral daily    MEDICATIONS  (PRN):  acetaminophen     Tablet .. 650 milliGRAM(s) Oral every 6 hours PRN Mild Pain (1 - 3)  melatonin 3 milliGRAM(s) Oral at bedtime PRN Insomnia      Allergies    No Known Allergies    Intolerances          VITALS  69y  Vital Signs Last 24 Hrs  T(C): 36.4 (31 Dec 2024 07:00), Max: 36.9 (30 Dec 2024 19:54)  T(F): 97.5 (31 Dec 2024 07:00), Max: 98.5 (30 Dec 2024 19:54)  HR: 70 (31 Dec 2024 07:00) (70 - 85)  BP: 153/75 (31 Dec 2024 07:00) (151/90 - 153/75)  BP(mean): --  RR: 17 (31 Dec 2024 07:00) (16 - 17)  SpO2: 98% (31 Dec 2024 07:00) (94% - 98%)    Parameters below as of 31 Dec 2024 07:00  Patient On (Oxygen Delivery Method): room air      Daily     Daily         RECENT LABS:                          13.7   10.93 )-----------( 286      ( 30 Dec 2024 06:03 )             39.6     12-30    140  |  102  |  20  ----------------------------<  116[H]  3.8   |  28  |  1.17    Ca    9.3      30 Dec 2024 06:03    TPro  7.8  /  Alb  3.1[L]  /  TBili  0.8  /  DBili  x   /  AST  30  /  ALT  41  /  AlkPhos  68  12-30    LIVER FUNCTIONS - ( 30 Dec 2024 06:03 )  Alb: 3.1 g/dL / Pro: 7.8 g/dL / ALK PHOS: 68 U/L / ALT: 41 U/L / AST: 30 U/L / GGT: x             Urinalysis Basic - ( 30 Dec 2024 06:03 )    Color: x / Appearance: x / SG: x / pH: x  Gluc: 116 mg/dL / Ketone: x  / Bili: x / Urobili: x   Blood: x / Protein: x / Nitrite: x   Leuk Esterase: x / RBC: x / WBC x   Sq Epi: x / Non Sq Epi: x / Bacteria: x          CAPILLARY BLOOD GLUCOSE

## 2024-12-31 NOTE — DISCHARGE NOTE PROVIDER - NSDCFUADDAPPT_GEN_ALL_CORE_FT
Please follow up with your Primary care doctor one week after discharge  Please follow up with your Rehab doctor Dr Valadez Within 4-6 week of discharge

## 2024-12-31 NOTE — DISCHARGE NOTE PROVIDER - NSDCMRMEDTOKEN_GEN_ALL_CORE_FT
acetaminophen 325 mg oral tablet: 2 tab(s) orally every 6 hours As needed Mild Pain (1 - 3)  amLODIPine 5 mg oral tablet: 1 tab(s) orally once a day  aspirin 81 mg oral tablet, chewable: 1 tab(s) orally once a day  atorvastatin 80 mg oral tablet: 1 tab(s) orally once a day (at bedtime)  clopidogrel 75 mg oral tablet: 1 tab(s) orally once a day  lidocaine 4% topical film: Apply topically to affected area once a day  melatonin 3 mg oral tablet: 1 tab(s) orally once a day (at bedtime) As needed Insomnia  polyethylene glycol 3350 oral powder for reconstitution: 17 gram(s) orally once a day   acetaminophen 325 mg oral tablet: 2 tab(s) orally every 6 hours As needed Mild Pain (1 - 3)  amLODIPine 5 mg oral tablet: 2 tab(s) orally once a day  aspirin 81 mg oral tablet, chewable: 1 tab(s) orally once a day  atorvastatin 80 mg oral tablet: 1 tab(s) orally once a day (at bedtime)  clopidogrel 75 mg oral tablet: 1 tab(s) orally once a day  FLUoxetine 10 mg oral capsule: 1 tab(s) orally once a day  lidocaine 4% topical film: Apply topically to affected area once a day  lisinopril 2.5 mg oral tablet: 1 tab(s) orally once a day  melatonin 3 mg oral tablet: 1 tab(s) orally once a day (at bedtime) As needed Insomnia  pantoprazole 40 mg oral delayed release tablet: 1 tab(s) orally once a day (before a meal)  polyethylene glycol 3350 oral powder for reconstitution: 17 gram(s) orally once a day

## 2024-12-31 NOTE — PROGRESS NOTE ADULT - SUBJECTIVE AND OBJECTIVE BOX
no acute events overnight      PHYSICAL EXAM:    Vital Signs Last 24 Hrs  T(F): 97.5 (31 Dec 2024 07:00), Max: 98.5 (30 Dec 2024 19:54)  HR: 70 (31 Dec 2024 07:00) (70 - 85)  BP: 153/75 (31 Dec 2024 07:00) (151/90 - 153/75)  RR: 17 (31 Dec 2024 07:00) (16 - 17)  SpO2: 98% (31 Dec 2024 07:00) (94% - 98%)  I&O's Summary    30 Dec 2024 07:01  -  31 Dec 2024 07:00  --------------------------------------------------------  IN: 480 mL / OUT: 1100 mL / NET: -620 mL        GENERAL: NAD  HEENT: NCAT  CHEST/LUNG: No increased WOB, Clear to percussion bilaterally; No rales, rhonchi, wheezing  HEART: Regular rate and rhythm; No murmurs  ABDOMEN: Soft, Nontender, Nondistended; Bowel sounds present  MUSCULOSKELETAL/EXTREMITIES:  2+ Peripheral Pulses, No LE edema  PSYCH: Appropriate affect, Alert & Oriented    LABS:                        13.7   10.93 )-----------( 286      ( 30 Dec 2024 06:03 )             39.6       12-30    140  |  102  |  20  ----------------------------<  116  3.8   |  28  |  1.17    Ca    9.3      30 Dec 2024 06:03    TPro  7.8  /  Alb  3.1  /  TBili  0.8  /  DBili  x   /  AST  30  /  ALT  41  /  AlkPhos  68  12-30                12-26 Chol 193 mg/dL LDL -- HDL 48 mg/dL Trig 100 mg/dL                  Urinalysis Basic - ( 30 Dec 2024 06:03 )    Color: x / Appearance: x / SG: x / pH: x  Gluc: 116 mg/dL / Ketone: x  / Bili: x / Urobili: x   Blood: x / Protein: x / Nitrite: x   Leuk Esterase: x / RBC: x / WBC x   Sq Epi: x / Non Sq Epi: x / Bacteria: x        COVID-19 PCR: NotDetec (12-27-24 @ 22:20)      MEDICATIONS  (STANDING):  amLODIPine   Tablet 5 milliGRAM(s) Oral daily  aspirin  chewable 81 milliGRAM(s) Oral daily  atorvastatin 80 milliGRAM(s) Oral at bedtime  clopidogrel Tablet 75 milliGRAM(s) Oral daily  enoxaparin Injectable 40 milliGRAM(s) SubCutaneous every 24 hours  lidocaine   4% Patch 1 Patch Transdermal daily  polyethylene glycol 3350 17 Gram(s) Oral daily    MEDICATIONS  (PRN):  acetaminophen     Tablet .. 650 milliGRAM(s) Oral every 6 hours PRN Mild Pain (1 - 3)  melatonin 3 milliGRAM(s) Oral at bedtime PRN Insomnia      Care Discussed with Consultants/Other Providers: Yes

## 2024-12-31 NOTE — DISCHARGE NOTE PROVIDER - PROVIDER TOKENS
PROVIDER:[TOKEN:[[id number]],FOLLOWUP:[2 weeks]],PROVIDER:[TOKEN:[[id number]],FOLLOWUP:[2 weeks]],PROVIDER:[TOKEN:[[id number]],FOLLOWUP:[1 month]]

## 2025-01-01 PROCEDURE — 99232 SBSQ HOSP IP/OBS MODERATE 35: CPT

## 2025-01-01 RX ADMIN — LIDOCAINE 1 PATCH: 50 OINTMENT TOPICAL at 05:24

## 2025-01-01 RX ADMIN — ENOXAPARIN SODIUM 40 MILLIGRAM(S): 60 INJECTION INTRAVENOUS; SUBCUTANEOUS at 05:29

## 2025-01-01 RX ADMIN — CLOPIDOGREL BISULFATE 75 MILLIGRAM(S): 75 TABLET, FILM COATED ORAL at 11:17

## 2025-01-01 RX ADMIN — LIDOCAINE 1 PATCH: 50 OINTMENT TOPICAL at 11:15

## 2025-01-01 RX ADMIN — LIDOCAINE 1 PATCH: 50 OINTMENT TOPICAL at 20:14

## 2025-01-01 RX ADMIN — ATORVASTATIN CALCIUM 80 MILLIGRAM(S): 40 TABLET, FILM COATED ORAL at 21:17

## 2025-01-01 RX ADMIN — Medication 81 MILLIGRAM(S): at 11:16

## 2025-01-01 RX ADMIN — Medication 5 MILLIGRAM(S): at 05:29

## 2025-01-01 RX ADMIN — Medication 17 GRAM(S): at 11:16

## 2025-01-01 NOTE — PROGRESS NOTE ADULT - ASSESSMENT
Rigo Da Silva is a 69-year-old male with a past medical history of HTN presenting with a one day history of left sided weakness and a ground level fall concerning for an acute ischemic stroke c/b rib fracture.     # Right pontine stroke  - Comprehensive Multidisciplinary Rehab   - AFO for left foot drop   - ambulate with kari walker  - aspirin, plavix and high intensity statin    #rib fx  -pain management per primary team    #HTN  - amlodipine 5mg po daily  - increase norvasc to 7.5 mg daily    #leukocytosis  -no s/s of infection  -likely reactive  -repeat and trend    DVT ppx: Lovenox

## 2025-01-01 NOTE — PROGRESS NOTE ADULT - SUBJECTIVE AND OBJECTIVE BOX
Patient seen and examined at bedside. no complaints.      ALLERGIES:  No Known Allergies    MEDICATIONS  (STANDING):  amLODIPine   Tablet 5 milliGRAM(s) Oral daily  aspirin  chewable 81 milliGRAM(s) Oral daily  atorvastatin 80 milliGRAM(s) Oral at bedtime  clopidogrel Tablet 75 milliGRAM(s) Oral daily  enoxaparin Injectable 40 milliGRAM(s) SubCutaneous every 24 hours  lidocaine   4% Patch 1 Patch Transdermal daily  polyethylene glycol 3350 17 Gram(s) Oral daily    MEDICATIONS  (PRN):  acetaminophen     Tablet .. 650 milliGRAM(s) Oral every 6 hours PRN Mild Pain (1 - 3)  melatonin 3 milliGRAM(s) Oral at bedtime PRN Insomnia    Vital Signs Last 24 Hrs  T(F): 98.4 (01 Jan 2025 08:34), Max: 98.4 (01 Jan 2025 08:34)  HR: 73 (01 Jan 2025 08:34) (68 - 77)  BP: 146/78 (01 Jan 2025 08:34) (146/61 - 160/82)  RR: 14 (01 Jan 2025 08:34) (14 - 17)  SpO2: 94% (01 Jan 2025 08:34) (94% - 99%)  I&O's Summary    31 Dec 2024 07:01  -  01 Jan 2025 07:00  --------------------------------------------------------  IN: 480 mL / OUT: 800 mL / NET: -320 mL      BMI (kg/m2): 35.4 (12-27-24 @ 20:39)  PHYSICAL EXAM:    Gen: nad, resting in bed  Neuro: aaox3  Heent: no jvd, no oral exudates  Pulm: cta b/l, no w/r/r  CV: +s1s2, no m/r/g  Ab: soft, nt/nd, normoactive bs x 4  Extrem: no edema, pulses intact and equal  Skin: no rashes  Psych: normal    LABS:                        13.7   10.93 )-----------( 286      ( 30 Dec 2024 06:03 )             39.6       12-30    140  |  102  |  20  ----------------------------<  116  3.8   |  28  |  1.17    Ca    9.3      30 Dec 2024 06:03    TPro  7.8  /  Alb  3.1  /  TBili  0.8  /  DBili  x   /  AST  30  /  ALT  41  /  AlkPhos  68  12-30                12-26 Chol 193 mg/dL LDL -- HDL 48 mg/dL Trig 100 mg/dL                  Urinalysis Basic - ( 30 Dec 2024 06:03 )    Color: x / Appearance: x / SG: x / pH: x  Gluc: 116 mg/dL / Ketone: x  / Bili: x / Urobili: x   Blood: x / Protein: x / Nitrite: x   Leuk Esterase: x / RBC: x / WBC x   Sq Epi: x / Non Sq Epi: x / Bacteria: x        COVID-19 PCR: NotDetec (12-27-24 @ 22:20)      RADIOLOGY & ADDITIONAL TESTS:    Care Discussed with Consultants/Other Providers:

## 2025-01-01 NOTE — PROGRESS NOTE ADULT - SUBJECTIVE AND OBJECTIVE BOX
CC: Patient being seen for rehabilitation follow up.  Slept well.  Reports no pain.    No other new ROS.  Has been tolerating rehabilitation program.    VITALS  T(C): 36.6 (12-31-24 @ 19:00), Max: 36.6 (12-31-24 @ 19:00)  HR: 68 (01-01-25 @ 05:30) (68 - 77)  BP: 160/82 (01-01-25 @ 05:30) (146/61 - 160/82)  RR: 17 (12-31-24 @ 19:00) (17 - 17)  SpO2: 95% (01-01-25 @ 05:30) (95% - 99%)  Wt(kg): --     MEDICATIONS   acetaminophen     Tablet .. 650 milliGRAM(s) every 6 hours PRN  amLODIPine   Tablet 5 milliGRAM(s) daily  aspirin  chewable 81 milliGRAM(s) daily  atorvastatin 80 milliGRAM(s) at bedtime  clopidogrel Tablet 75 milliGRAM(s) daily  enoxaparin Injectable 40 milliGRAM(s) every 24 hours  lidocaine   4% Patch 1 Patch daily  melatonin 3 milliGRAM(s) at bedtime PRN  polyethylene glycol 3350 17 Gram(s) daily      RECENT LABS/IMAGING                     ------------------------------------------  PHYSICAL EXAM  Constitutional - NAD, Comfortable  Pulm - Breathing comfortably, No wheezing  Abd - Nondistended  Extremities - No edema  Neurologic Exam - Awake, Alert  Psychiatric - Mood WNL     ASSESSMENT/PLAN  69y Male with impairments in mobility and ADLs   - Continue current rehabilitation program 3hrs a day   - Continue current medications, patient is medically stable - monitor BP  - DVT prophylaxis  - Skin - OOB and mobilization daily

## 2025-01-02 LAB
ALBUMIN SERPL ELPH-MCNC: 3.5 G/DL — SIGNIFICANT CHANGE UP (ref 3.3–5)
ALP SERPL-CCNC: 69 U/L — SIGNIFICANT CHANGE UP (ref 40–120)
ALT FLD-CCNC: 63 U/L — HIGH (ref 10–45)
ANION GAP SERPL CALC-SCNC: 7 MMOL/L — SIGNIFICANT CHANGE UP (ref 5–17)
AST SERPL-CCNC: 36 U/L — SIGNIFICANT CHANGE UP (ref 10–40)
BILIRUB SERPL-MCNC: 0.7 MG/DL — SIGNIFICANT CHANGE UP (ref 0.2–1.2)
BUN SERPL-MCNC: 18 MG/DL — SIGNIFICANT CHANGE UP (ref 7–23)
CALCIUM SERPL-MCNC: 9.6 MG/DL — SIGNIFICANT CHANGE UP (ref 8.4–10.5)
CHLORIDE SERPL-SCNC: 105 MMOL/L — SIGNIFICANT CHANGE UP (ref 96–108)
CO2 SERPL-SCNC: 29 MMOL/L — SIGNIFICANT CHANGE UP (ref 22–31)
CREAT SERPL-MCNC: 1.04 MG/DL — SIGNIFICANT CHANGE UP (ref 0.5–1.3)
EGFR: 78 ML/MIN/1.73M2 — SIGNIFICANT CHANGE UP
GLUCOSE SERPL-MCNC: 140 MG/DL — HIGH (ref 70–99)
HCT VFR BLD CALC: 40.8 % — SIGNIFICANT CHANGE UP (ref 39–50)
HGB BLD-MCNC: 14 G/DL — SIGNIFICANT CHANGE UP (ref 13–17)
MCHC RBC-ENTMCNC: 29.7 PG — SIGNIFICANT CHANGE UP (ref 27–34)
MCHC RBC-ENTMCNC: 34.3 G/DL — SIGNIFICANT CHANGE UP (ref 32–36)
MCV RBC AUTO: 86.6 FL — SIGNIFICANT CHANGE UP (ref 80–100)
NRBC # BLD: 0 /100 WBCS — SIGNIFICANT CHANGE UP (ref 0–0)
PLATELET # BLD AUTO: 327 K/UL — SIGNIFICANT CHANGE UP (ref 150–400)
POTASSIUM SERPL-MCNC: 4.1 MMOL/L — SIGNIFICANT CHANGE UP (ref 3.5–5.3)
POTASSIUM SERPL-SCNC: 4.1 MMOL/L — SIGNIFICANT CHANGE UP (ref 3.5–5.3)
PROT SERPL-MCNC: 8.5 G/DL — HIGH (ref 6–8.3)
RBC # BLD: 4.71 M/UL — SIGNIFICANT CHANGE UP (ref 4.2–5.8)
RBC # FLD: 11.6 % — SIGNIFICANT CHANGE UP (ref 10.3–14.5)
SODIUM SERPL-SCNC: 141 MMOL/L — SIGNIFICANT CHANGE UP (ref 135–145)
WBC # BLD: 10.5 K/UL — SIGNIFICANT CHANGE UP (ref 3.8–10.5)
WBC # FLD AUTO: 10.5 K/UL — SIGNIFICANT CHANGE UP (ref 3.8–10.5)

## 2025-01-02 PROCEDURE — 99232 SBSQ HOSP IP/OBS MODERATE 35: CPT

## 2025-01-02 RX ADMIN — ENOXAPARIN SODIUM 40 MILLIGRAM(S): 60 INJECTION INTRAVENOUS; SUBCUTANEOUS at 05:10

## 2025-01-02 RX ADMIN — ATORVASTATIN CALCIUM 80 MILLIGRAM(S): 40 TABLET, FILM COATED ORAL at 21:07

## 2025-01-02 RX ADMIN — Medication 17 GRAM(S): at 11:44

## 2025-01-02 RX ADMIN — Medication 2.5 MILLIGRAM(S): at 18:04

## 2025-01-02 RX ADMIN — LIDOCAINE 1 PATCH: 50 OINTMENT TOPICAL at 00:00

## 2025-01-02 RX ADMIN — LIDOCAINE 1 PATCH: 50 OINTMENT TOPICAL at 11:44

## 2025-01-02 RX ADMIN — LIDOCAINE 1 PATCH: 50 OINTMENT TOPICAL at 19:28

## 2025-01-02 RX ADMIN — LIDOCAINE 1 PATCH: 50 OINTMENT TOPICAL at 23:55

## 2025-01-02 RX ADMIN — CLOPIDOGREL BISULFATE 75 MILLIGRAM(S): 75 TABLET, FILM COATED ORAL at 11:44

## 2025-01-02 RX ADMIN — Medication 81 MILLIGRAM(S): at 11:44

## 2025-01-02 RX ADMIN — Medication 5 MILLIGRAM(S): at 05:10

## 2025-01-02 NOTE — PROGRESS NOTE ADULT - ASSESSMENT
Rigo Da Silva is a 69-year-old male with a past medical history of HTN presenting with a one day history of left sided weakness and a ground level fall , found to have pontine CVA c/b rib fracture.     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 FES to left shoulder, bicep in therapy  - rec therapy  - AFO evaluation at [facility] to allow for continued motor recovery  - Precautions: cardiac, fall, aspiration    # rib fractures  - Incentive spirometry  - pain mgt as below    # HTN  - amlodipine 5 mg daily  - BP (143/75 - 159/80) 1/2    # Sleep:  - PRN 3mg melatonin    # Pain Management:  - Tylenol PRN  - lidocaine patch prn  - ibuprofen prn    # GI/Bowel:  - Senna QHS  - Miralax daily PRN    # /Bladder:   - Voiding well    # Diet:   - regular with thins     # DVT ppx:  - Lovenox 40 mg SQ     # Case discussed in IDT rounds 12/30 (initial)  - continent B/B, skin intact 2nd floor 2 family home +stairs, regular solid thin liquids, mild cognitive impairment in memory and executive function, mod assist bed mobility, max toileting hygiene, max foot wear, max toilet transfers, mod assist stand pivot, ambulates 10 feet with HR and WC follow  - barriers: lives alone, rib pain, steps, left HP, no support on dc, cognitive impairment  - goals: supervision transfers and ambulation household level with cane, 8-12 steps CS, Cs/supervision bADLs "to become independent and return back to work" "Remberto" Recall therapy recommendations independently, supervision transfers CS  - target: 1/10 to [facility]    # LABS  CBC CMP 1/2-- reviewed, stable, slight elevation in AST will monitor  next CBC CMP 1/6    ---------------  Outpatient Follow-up:    Marquise Fraser)  Neurology  [address] [facility], [address]  [place] [address]  Phone: [phone]  Fax: [phone]  Follow Up Time:     Your Primary Care Provider,     Demetri Lee  Cardiac Electrophysiology  [address] [place] [address], [address] [place] [address]  Phone: [phone]  Fax: [phone]  Follow Up Time:  --------------     Rigo Da Silva is a 69-year-old male with a past medical history of HTN presenting with a one day history of left sided weakness and a ground level fall , found to have pontine CVA c/b rib fracture.     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 FES to left shoulder, bicep in therapy  - rec therapy  - AFO evaluation at [facility] to allow for continued motor recovery  - Precautions: cardiac, fall, aspiration    # rib fractures  - Incentive spirometry  - pain mgt as below    # HTN  - amlodipine 5 mg daily  - BP (143/75 - 159/80) 1/2    # borderline leukocytosis  - fatigue, but otherwise asymptomatic, no fever  - WBC 10.5 1/2  - repeat CBC in AM 1/3    # mild transaminitis  - slight elevation in AST 63 1/3  - will monitor    # Sleep:  - PRN 3mg melatonin    # Pain Management:  - Tylenol PRN  - lidocaine patch prn  - ibuprofen prn    # GI/Bowel:  - Senna QHS  - Miralax daily PRN    # /Bladder:   - Voiding well    # Diet:   - regular with thins     # DVT ppx:  - Lovenox 40 mg SQ     # Case discussed in IDT rounds 12/30 (initial)  - continent B/B, skin intact 2nd floor 2 family home +stairs, regular solid thin liquids, mild cognitive impairment in memory and executive function, mod assist bed mobility, max toileting hygiene, max foot wear, max toilet transfers, mod assist stand pivot, ambulates 10 feet with HR and WC follow  - barriers: lives alone, rib pain, steps, left HP, no support on dc, cognitive impairment  - goals: supervision transfers and ambulation household level with cane, 8-12 steps CS, Cs/supervision bADLs "to become independent and return back to work" "Remberto" Recall therapy recommendations independently, supervision transfers CS  - target: 1/10 to [facility]    # LABS  CBC CMP 1/3  CBC CMP 1/6    ---------------  Outpatient Follow-up:    Marquise Fraser)  Neurology  [address] [facility], [address]  [place] [address]  Phone: [phone]  Fax: [phone]  Follow Up Time:     Your Primary Care Provider,     Demetri Lee  Cardiac Electrophysiology  [address] [place] [address], [address]  [place] [address]  Phone: [phone]  Fax: [phone]  Follow Up Time:  --------------     Rigo Da Silva is a 69-year-old male with a past medical history of HTN presenting with a one day history of left sided weakness and a ground level fall , found to have pontine CVA c/b rib fracture.     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 FES to left shoulder, bicep in therapy  - rec therapy  - AFO evaluation at [facility] to allow for continued motor recovery  - Precautions: cardiac, fall, aspiration    # rib fractures  - Incentive spirometry  - pain mgt as below    # HTN  - amlodipine 5 mg daily  - BP (143/75 - 159/80) 1/2    # borderline leukocytosis  - fatigue, but otherwise asymptomatic, no fever  - WBC 10.5 1/2  - repeat CBC in AM 1/3    # mild transaminitis  - slight elevation in AST 63 1/3  - will monitor    # Sleep:  - PRN 3mg melatonin    # Pain Management:  - Tylenol PRN  - lidocaine patch prn  - ibuprofen prn    # GI/Bowel:  - Senna QHS  - Miralax daily PRN    # /Bladder:   - Voiding well    # Diet:   - regular with thins     # DVT ppx:  - Lovenox 40 mg SQ     # Case discussed in IDT rounds 12/30 (initial)  - continent B/B, skin intact 2nd floor 2 family home +stairs, regular solid thin liquids, mild cognitive impairment in memory and executive function, mod assist bed mobility, max toileting hygiene, max foot wear, max toilet transfers, mod assist stand pivot, ambulates 10 feet with HR and WC follow  - barriers: lives alone, rib pain, steps, left HP, no support on dc, cognitive impairment  - goals: supervision transfers and ambulation household level with cane, 8-12 steps CS, Cs/supervision bADLs "to become independent and return back to work" "Remberto" Recall therapy recommendations independently, supervision transfers CS  - target: 1/10 to [facility]    # LABS  CBC CMP 1/3  CBC CMP 1/6    addendum 2:41 PM  - Patient with elevated /74. Will check bladder scan x 1 r/o retention as BP sl higher than usual range, and has uptrending WBC with fatigue. If no retention, will give spot dose 2.5 norvasc and increase standing dose starting 1/3    ---------------  Outpatient Follow-up:    Marquise Fraser)  Neurology  [address] [facility], [address]  [place] [address]  Phone: [phone]  Fax: [phone]  Follow Up Time:     Your Primary Care Provider,     Demetri Lee  Cardiac Electrophysiology  [address] [place] [address], [address]  [place] [address]  Phone: [phone]  Fax: [phone]  Follow Up Time:  --------------     Rigo Da Silva is a 69-year-old male with a past medical history of HTN presenting with a one day history of left sided weakness and a ground level fall , found to have pontine CVA c/b rib fracture.     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 FES to left shoulder, bicep in therapy  - rec therapy  - AFO evaluation at [facility] to allow for continued motor recovery  - Precautions: cardiac, fall, aspiration    # rib fractures  - Incentive spirometry  - pain mgt as below    # HTN  - amlodipine 5 mg daily  - BP (143/75 - 159/80) 1/2    # borderline leukocytosis  - fatigue, but otherwise asymptomatic, no fever  - WBC 10.5 1/2  - repeat CBC in AM 1/3    # mild transaminitis  - slight elevation in AST 63 1/3  - will monitor    # Sleep:  - PRN 3mg melatonin    # Pain Management:  - Tylenol PRN  - lidocaine patch prn  - ibuprofen prn    # GI/Bowel:  - Senna QHS  - Miralax daily PRN    # /Bladder:   - Voiding well    # Diet:   - regular with thins     # DVT ppx:  - Lovenox 40 mg SQ     # Case discussed in IDT rounds 12/30 (initial)  - continent B/B, skin intact 2nd floor 2 family home +stairs, regular solid thin liquids, mild cognitive impairment in memory and executive function, mod assist bed mobility, max toileting hygiene, max foot wear, max toilet transfers, mod assist stand pivot, ambulates 10 feet with HR and WC follow  - barriers: lives alone, rib pain, steps, left HP, no support on dc, cognitive impairment  - goals: supervision transfers and ambulation household level with cane, 8-12 steps CS, Cs/supervision bADLs "to become independent and return back to work" "Remberto" Recall therapy recommendations independently, supervision transfers CS  - target: 1/10 to [facility]    # LABS  CBC CMP 1/3  CBC CMP 1/6    addendum 2:41 PM  - Patient with elevated /74. Will check bladder scan x 1 r/o retention as BP sl higher than usual range, and has uptrending WBC with fatigue. If no retention, will give spot dose 2.5 norvasc and increase standing dose starting 1/3    addendum 3:05 PM  - Patient just voided urine and had BM. PVR 42 ml. Discussed with hospitalist, amlodipine started on admission but SBP still ranging 140-160s. Will give 2.5 mg x 1 now and start 10 mg standing 1/3,    ---------------  Outpatient Follow-up:    Marquise Fraser)  Neurology  [address] [facility], [address]  [place] [address]  Phone: [phone]  Fax: [phone]  Follow Up Time:     Your Primary Care Provider,     Demetri Lee  Cardiac Electrophysiology  [address] [place] [address], [address]  [place] [address]  Phone: [phone]  Fax: [phone]  Follow Up Time:  --------------

## 2025-01-02 NOTE — PROGRESS NOTE ADULT - SUBJECTIVE AND OBJECTIVE BOX
Patient is a 69y old  Male who presents with a chief complaint of Right pontine stroke. (30 Dec 2024 14:32)      HPI:  Rigo Da Silva is a 69-year-old male with a past medical history of HTN presenting with a one day history of left sided weakness and a ground level fall concerning for an acute ischemic stroke and found to have a rib fracture. Code stroke was called and neurology consulted. NIHSS 4, LKW 12/23 2200. CT head with hypoattenuation in the right pontine location concerning for an acute ischemic stroke. Presented >24 hours from symptom onset. Likely 2/2 to intracranial atherosclerosis per neurology. PT/OT/PM&R evaluated patient and rec  acute inpatient rehab. Patient was optimized for discharge on 12/27 to [facility].    (27 Dec 2024 16:35)      SUBJECTIVE/ROS:      Patient was seen and evaluated at bedside.  NAD and tolerating therapies. No other acute primary complaints.  Denies any CP, SOB, LIVINGSTON, palpitations, fever, chills, body aches, cough, congestion, or any other symptoms at this time.       PAST MEDICAL & SURGICAL HISTORY:  HTN (hypertension)      Unilateral inguinal hernia, without obstruction or gangrene, not specified as recurrent      H/O hernia repair  right      History of surgery on arm  fx with metal        MEDICATIONS  (STANDING):  amLODIPine   Tablet 5 milliGRAM(s) Oral daily  aspirin  chewable 81 milliGRAM(s) Oral daily  atorvastatin 80 milliGRAM(s) Oral at bedtime  clopidogrel Tablet 75 milliGRAM(s) Oral daily  enoxaparin Injectable 40 milliGRAM(s) SubCutaneous every 24 hours  lidocaine   4% Patch 1 Patch Transdermal daily  polyethylene glycol 3350 17 Gram(s) Oral daily    MEDICATIONS  (PRN):  acetaminophen     Tablet .. 650 milliGRAM(s) Oral every 6 hours PRN Mild Pain (1 - 3)  melatonin 3 milliGRAM(s) Oral at bedtime PRN Insomnia        Allergies    No Known Allergies    Intolerances          VITALS  69y  T(C): 36.9 (01-02-25 @ 09:33), Max: 36.9 (01-02-25 @ 09:33)  T(F): 98.5 (01-02-25 @ 09:33), Max: 98.5 (01-02-25 @ 09:33)  HR: 77 (01-02-25 @ 09:33) (67 - 77)  BP: 147/70 (01-02-25 @ 09:33) (143/75 - 159/80)  ABP: --  ABP(mean): --  RR: 14 (01-02-25 @ 09:33) (14 - 16)  SpO2: 91% (01-02-25 @ 09:33) (91% - 96%)      Daily     Daily         LAB                        14.0   10.50 )-----------( 327      ( 02 Jan 2025 08:46 )             40.8     01-02    141  |  105  |  18  ----------------------------<  140[H]  4.1   |  29  |  1.04    Ca    9.6      02 Jan 2025 08:46    TPro  8.5[H]  /  Alb  3.5  /  TBili  0.7  /  DBili  x   /  AST  36  /  ALT  63[H]  /  AlkPhos  69  01-02    LIVER FUNCTIONS - ( 02 Jan 2025 08:46 )  Alb: 3.5 g/dL / Pro: 8.5 g/dL / ALK PHOS: 69 U/L / ALT: 63 U/L / AST: 36 U/L / GGT: x                 Urinalysis Basic - ( 02 Jan 2025 08:46 )    Color: x / Appearance: x / SG: x / pH: x  Gluc: 140 mg/dL / Ketone: x  / Bili: x / Urobili: x   Blood: x / Protein: x / Nitrite: x   Leuk Esterase: x / RBC: x / WBC x   Sq Epi: x / Non Sq Epi: x / Bacteria: x                  CAPILLARY BLOOD GLUCOSE        PHYSICAL EXAM:   Constitutional - NAD, Comfortable  Chest - good chest expansion, good respiratory effort  Cardio - warm and well perfused  Abdomen -  Nondistended  Extremities - No peripheral edema  Neurologic Exam:                           Orientation: Awake, Alert  Psychiatric - Mood stable, Affect WNL           Patient is a 69y old  Male who presents with a chief complaint of Right pontine stroke. (30 Dec 2024 14:32)      HPI:  Rigo Da Silva is a 69-year-old male with a past medical history of HTN presenting with a one day history of left sided weakness and a ground level fall concerning for an acute ischemic stroke and found to have a rib fracture. Code stroke was called and neurology consulted. NIHSS 4, LKW 12/23 2200. CT head with hypoattenuation in the right pontine location concerning for an acute ischemic stroke. Presented >24 hours from symptom onset. Likely 2/2 to intracranial atherosclerosis per neurology. PT/OT/PM&R evaluated patient and rec  acute inpatient rehab. Patient was optimized for discharge on 12/27 to [facility].    (27 Dec 2024 16:35)      SUBJECTIVE/ROS:      Patient was seen and evaluated at bedside.  NAD and tolerating therapies. No other acute primary complaints.  Denies any CP, SOB, LIVINGSTON, palpitations, fever, chills, body aches, cough, congestion, or any other symptoms at this time.       PAST MEDICAL & SURGICAL HISTORY:  HTN (hypertension)      Unilateral inguinal hernia, without obstruction or gangrene, not specified as recurrent      H/O hernia repair  right      History of surgery on arm  fx with metal        MEDICATIONS  (STANDING):  amLODIPine   Tablet 5 milliGRAM(s) Oral daily  aspirin  chewable 81 milliGRAM(s) Oral daily  atorvastatin 80 milliGRAM(s) Oral at bedtime  clopidogrel Tablet 75 milliGRAM(s) Oral daily  enoxaparin Injectable 40 milliGRAM(s) SubCutaneous every 24 hours  lidocaine   4% Patch 1 Patch Transdermal daily  polyethylene glycol 3350 17 Gram(s) Oral daily    MEDICATIONS  (PRN):  acetaminophen     Tablet .. 650 milliGRAM(s) Oral every 6 hours PRN Mild Pain (1 - 3)  melatonin 3 milliGRAM(s) Oral at bedtime PRN Insomnia        Allergies    No Known Allergies    Intolerances          VITALS  69y  T(C): 36.9 (01-02-25 @ 09:33), Max: 36.9 (01-02-25 @ 09:33)  T(F): 98.5 (01-02-25 @ 09:33), Max: 98.5 (01-02-25 @ 09:33)  HR: 77 (01-02-25 @ 09:33) (67 - 77)  BP: 147/70 (01-02-25 @ 09:33) (143/75 - 159/80)  ABP: --  ABP(mean): --  RR: 14 (01-02-25 @ 09:33) (14 - 16)  SpO2: 91% (01-02-25 @ 09:33) (91% - 96%)      Daily     Daily         LAB                        14.0   10.50 )-----------( 327      ( 02 Jan 2025 08:46 )             40.8     01-02    141  |  105  |  18  ----------------------------<  140[H]  4.1   |  29  |  1.04    Ca    9.6      02 Jan 2025 08:46    TPro  8.5[H]  /  Alb  3.5  /  TBili  0.7  /  DBili  x   /  AST  36  /  ALT  63[H]  /  AlkPhos  69  01-02    LIVER FUNCTIONS - ( 02 Jan 2025 08:46 )  Alb: 3.5 g/dL / Pro: 8.5 g/dL / ALK PHOS: 69 U/L / ALT: 63 U/L / AST: 36 U/L / GGT: x                 Urinalysis Basic - ( 02 Jan 2025 08:46 )    Color: x / Appearance: x / SG: x / pH: x  Gluc: 140 mg/dL / Ketone: x  / Bili: x / Urobili: x   Blood: x / Protein: x / Nitrite: x   Leuk Esterase: x / RBC: x / WBC x   Sq Epi: x / Non Sq Epi: x / Bacteria: x                  CAPILLARY BLOOD GLUCOSE        PHYSICAL EXAM:   Constitutional - NAD, Comfortable  Chest - good chest expansion, good respiratory effort  Cardio - warm and well perfused  Abdomen -  Nondistended  Extremities - No peripheral edema  Neurologic Exam:                           Orientation: Awake, Alert  Psychiatric - Mood stable, Affect WNL

## 2025-01-03 LAB
ALBUMIN SERPL ELPH-MCNC: 3.1 G/DL — LOW (ref 3.3–5)
ALP SERPL-CCNC: 62 U/L — SIGNIFICANT CHANGE UP (ref 40–120)
ALT FLD-CCNC: 51 U/L — HIGH (ref 10–45)
ANION GAP SERPL CALC-SCNC: 6 MMOL/L — SIGNIFICANT CHANGE UP (ref 5–17)
AST SERPL-CCNC: 27 U/L — SIGNIFICANT CHANGE UP (ref 10–40)
BILIRUB SERPL-MCNC: 0.6 MG/DL — SIGNIFICANT CHANGE UP (ref 0.2–1.2)
BUN SERPL-MCNC: 19 MG/DL — SIGNIFICANT CHANGE UP (ref 7–23)
CALCIUM SERPL-MCNC: 9.3 MG/DL — SIGNIFICANT CHANGE UP (ref 8.4–10.5)
CHLORIDE SERPL-SCNC: 105 MMOL/L — SIGNIFICANT CHANGE UP (ref 96–108)
CO2 SERPL-SCNC: 29 MMOL/L — SIGNIFICANT CHANGE UP (ref 22–31)
CREAT SERPL-MCNC: 1.05 MG/DL — SIGNIFICANT CHANGE UP (ref 0.5–1.3)
EGFR: 78 ML/MIN/1.73M2 — SIGNIFICANT CHANGE UP
GLUCOSE SERPL-MCNC: 113 MG/DL — HIGH (ref 70–99)
HCT VFR BLD CALC: 37.4 % — LOW (ref 39–50)
HCT VFR BLD CALC: 40.3 % — SIGNIFICANT CHANGE UP (ref 39–50)
HGB BLD-MCNC: 12.8 G/DL — LOW (ref 13–17)
HGB BLD-MCNC: 13.4 G/DL — SIGNIFICANT CHANGE UP (ref 13–17)
MCHC RBC-ENTMCNC: 29.1 PG — SIGNIFICANT CHANGE UP (ref 27–34)
MCHC RBC-ENTMCNC: 29.3 PG — SIGNIFICANT CHANGE UP (ref 27–34)
MCHC RBC-ENTMCNC: 33.3 G/DL — SIGNIFICANT CHANGE UP (ref 32–36)
MCHC RBC-ENTMCNC: 34.2 G/DL — SIGNIFICANT CHANGE UP (ref 32–36)
MCV RBC AUTO: 85.6 FL — SIGNIFICANT CHANGE UP (ref 80–100)
MCV RBC AUTO: 87.4 FL — SIGNIFICANT CHANGE UP (ref 80–100)
NRBC # BLD: 0 /100 WBCS — SIGNIFICANT CHANGE UP (ref 0–0)
NRBC # BLD: 0 /100 WBCS — SIGNIFICANT CHANGE UP (ref 0–0)
PLATELET # BLD AUTO: 321 K/UL — SIGNIFICANT CHANGE UP (ref 150–400)
PLATELET # BLD AUTO: 323 K/UL — SIGNIFICANT CHANGE UP (ref 150–400)
POTASSIUM SERPL-MCNC: 4.2 MMOL/L — SIGNIFICANT CHANGE UP (ref 3.5–5.3)
POTASSIUM SERPL-SCNC: 4.2 MMOL/L — SIGNIFICANT CHANGE UP (ref 3.5–5.3)
PROT SERPL-MCNC: 7.7 G/DL — SIGNIFICANT CHANGE UP (ref 6–8.3)
RBC # BLD: 4.37 M/UL — SIGNIFICANT CHANGE UP (ref 4.2–5.8)
RBC # BLD: 4.61 M/UL — SIGNIFICANT CHANGE UP (ref 4.2–5.8)
RBC # FLD: 11.5 % — SIGNIFICANT CHANGE UP (ref 10.3–14.5)
RBC # FLD: 11.7 % — SIGNIFICANT CHANGE UP (ref 10.3–14.5)
SODIUM SERPL-SCNC: 140 MMOL/L — SIGNIFICANT CHANGE UP (ref 135–145)
WBC # BLD: 10.3 K/UL — SIGNIFICANT CHANGE UP (ref 3.8–10.5)
WBC # BLD: 9.36 K/UL — SIGNIFICANT CHANGE UP (ref 3.8–10.5)
WBC # FLD AUTO: 10.3 K/UL — SIGNIFICANT CHANGE UP (ref 3.8–10.5)
WBC # FLD AUTO: 9.36 K/UL — SIGNIFICANT CHANGE UP (ref 3.8–10.5)

## 2025-01-03 PROCEDURE — 99232 SBSQ HOSP IP/OBS MODERATE 35: CPT

## 2025-01-03 RX ORDER — PANTOPRAZOLE 40 MG/1
40 TABLET, DELAYED RELEASE ORAL
Refills: 0 | Status: DISCONTINUED | OUTPATIENT
Start: 2025-01-03 | End: 2025-01-10

## 2025-01-03 RX ADMIN — Medication 81 MILLIGRAM(S): at 11:35

## 2025-01-03 RX ADMIN — CLOPIDOGREL BISULFATE 75 MILLIGRAM(S): 75 TABLET, FILM COATED ORAL at 11:35

## 2025-01-03 RX ADMIN — ATORVASTATIN CALCIUM 80 MILLIGRAM(S): 40 TABLET, FILM COATED ORAL at 21:36

## 2025-01-03 RX ADMIN — Medication 10 MILLIGRAM(S): at 05:15

## 2025-01-03 RX ADMIN — ENOXAPARIN SODIUM 40 MILLIGRAM(S): 60 INJECTION INTRAVENOUS; SUBCUTANEOUS at 05:15

## 2025-01-03 NOTE — PROGRESS NOTE ADULT - ASSESSMENT
Rigo Da Silva is a 69-year-old male with a past medical history of HTN presenting with a one day history of left sided weakness and a ground level fall , found to have pontine CVA c/b rib fracture.     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 FES to left shoulder, bicep in therapy  - rec therapy  - AFO evaluation at [facility] to allow for continued motor recovery  - Precautions: cardiac, fall, aspiration    # rib fractures  - Incentive spirometry  - pain mgt as below    # HTN  - amlodipine 5 mg daily  - BP (136/65 - 168/74) 1/3    #asymptomatic drop in hgb  - hgb 14 --> 12.7 1/3  - rechecked this morning at 13.4  - cbc 1/6    # borderline leukocytosis  - fatigue, but otherwise asymptomatic, no fever  - WBC 10.3 1/3    # mild transaminitis  - slight elevation in AST 51 1/3  - will monitor    # Sleep:  - PRN 3mg melatonin    # Pain Management:  - Tylenol PRN  - lidocaine patch prn  - ibuprofen prn    # GI/Bowel:  - Senna QHS  - Miralax daily PRN    # /Bladder:   - Voiding well    # Diet:   - regular with thins     # DVT ppx:  - Lovenox 40 mg SQ     # Case discussed in IDT rounds 12/30 (initial)  - continent B/B, skin intact 2nd floor 2 family home +stairs, regular solid thin liquids, mild cognitive impairment in memory and executive function, mod assist bed mobility, max toileting hygiene, max foot wear, max toilet transfers, mod assist stand pivot, ambulates 10 feet with HR and WC follow  - barriers: lives alone, rib pain, steps, left HP, no support on dc, cognitive impairment  - goals: supervision transfers and ambulation household level with cane, 8-12 steps CS, Cs/supervision bADLs "to become independent and return back to work" "Remberto" Recall therapy recommendations independently, supervision transfers CS  - target: 1/10 to [facility]    # LABS  CBC CMP 1/6        ---------------  Outpatient Follow-up:    Marquise Fraser)  Neurology  [address], [address]  [place] [address]  Phone: [phone]  Fax: [phone]  Follow Up Time:     Your Primary Care Provider,     Demetri Lee  Cardiac Electrophysiology  [address] [place] [address], [address]  [place] [address]  Phone: [phone]  Fax: [phone]  Follow Up Time:  --------------     Rigo Da Silva is a 69-year-old male with a past medical history of HTN presenting with a one day history of left sided weakness and a ground level fall , found to have pontine CVA c/b rib fracture.     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 FES to left shoulder, bicep in therapy  - rec therapy  - left foot drop: AFO evaluation at [facility] to allow for continued motor recovery  - Precautions: cardiac, fall, aspiration    # rib fractures  - Incentive spirometry  - pain mgt as below    # HTN  - amlodipine 5 mg daily  - BP (136/65 - 168/74) 1/3    #asymptomatic drop in hgb  - hgb 14 --> 12.7 1/3  - rechecked this morning at 13.4  - CBC 1/6    # borderline leukocytosis  - fatigue, but otherwise asymptomatic, no fever  - WBC 10.3 1/3    # mild transaminitis  - slight elevation in AST 51 1/3  - will monitor. LFTs 1/6    # Sleep:  - PRN 3mg melatonin    # Pain Management:  - Tylenol PRN  - lidocaine patch prn  - ibuprofen prn    # GI/Bowel:  - Senna QHS  - Miralax daily PRN  - patnoprazole added due to DAPT 1/3    # /Bladder:   - Voiding well    # Diet:   - regular with thins     # DVT ppx:  - Lovenox 40 mg SQ     # Case discussed in IDT rounds 12/30 (initial)  - continent B/B, skin intact 2nd floor 2 family home +stairs, regular solid thin liquids, mild cognitive impairment in memory and executive function, mod assist bed mobility, max toileting hygiene, max foot wear, max toilet transfers, mod assist stand pivot, ambulates 10 feet with HR and WC follow  - barriers: lives alone, rib pain, steps, left HP, no support on dc, cognitive impairment  - goals: supervision transfers and ambulation household level with cane, 8-12 steps CS, Cs/supervision bADLs "to become independent and return back to work" "Remberto" Recall therapy recommendations independently, supervision transfers CS  - target: 1/10 to [facility]    # LABS  CBC CMP 1/6        ---------------  Outpatient Follow-up:    Marquise Fraser)  Neurology  [address] [facility], [address]  [place] [address]  Phone: [phone]  Fax: [phone]  Follow Up Time:     Your Primary Care Provider,     Demetri Lee  Cardiac Electrophysiology  [address] [place] [address], [address]  [place] [address]  Phone: [phone]  Fax: [phone]  Follow Up Time:  --------------

## 2025-01-03 NOTE — PROGRESS NOTE ADULT - ATTENDING COMMENTS
Progress note amended to include my discussions with patient, resident, hospitalist, RN, SW, PT and my findings    Patient seen in PT, ambulating with quad cane and left sling in place. LLE with reduced hip flexion, reduced LLE clearance, and externally rotated leg and foot, with some instability in ankle/inversion tendency as well as occasionally buckling of knee, no recurvatum noted. He also appears slightly more fatigued, less bright and interactive; no fever, no cough, no dysuria, no diarrhea. No headache or complaints of left UE pain. However, noted to have slightly elevated WBC and uptrending AST. Will monitor, as afebrile and without specific complaints, CBC and CMP in AM    Continue to montior BP, if remains elevated, will increase amlodipine dose. Remainder vitals/labs stable    Continue current program.     RECENT LABS    Vital Signs Last 24 Hrs  T(C): 36.9 (02 Jan 2025 09:33), Max: 36.9 (02 Jan 2025 09:33)  T(F): 98.5 (02 Jan 2025 09:33), Max: 98.5 (02 Jan 2025 09:33)  HR: 77 (02 Jan 2025 09:33) (67 - 77)  BP: 147/70 (02 Jan 2025 09:33) (143/75 - 159/80)  BP(mean): --  RR: 14 (02 Jan 2025 09:33) (14 - 16)  SpO2: 91% (02 Jan 2025 09:33) (91% - 96%)    Parameters below as of 02 Jan 2025 09:33  Patient On (Oxygen Delivery Method): room air                              14.0   10.50 )-----------( 327      ( 02 Jan 2025 08:46 )             40.8     01-02    141  |  105  |  18  ----------------------------<  140[H]  4.1   |  29  |  1.04    Ca    9.6      02 Jan 2025 08:46    TPro  8.5[H]  /  Alb  3.5  /  TBili  0.7  /  DBili  x   /  AST  36  /  ALT  63[H]  /  AlkPhos  69  01-02      Urinalysis Basic - ( 02 Jan 2025 08:46 )    Color: x / Appearance: x / SG: x / pH: x  Gluc: 140 mg/dL / Ketone: x  / Bili: x / Urobili: x   Blood: x / Protein: x / Nitrite: x   Leuk Esterase: x / RBC: x / WBC x   Sq Epi: x / Non Sq Epi: x / Bacteria: x      CAPILLARY BLOOD GLUCOSE
Progress note amended to include my discussions with patient, resident, hospitalist, RN, SW, PT and my findings    Patient seen sitting in chair. He appears tired, and does report subjective fatigue although he denies cough, congestion, H/A, dysuria, diarrhea, There was an initial drop in his Hgb from 14 yesterday to 12.8 today; he denies abdominal pain, no N/V, no noted BRBPR/hematochezia. Currently on ASA and plavix, pantoprazole added and repeat CBC ordered, which shows Hgb 13.4, Will continue to monitor, no outward signs bleeding noted, vitals stable.     Patient denies any specific symptoms contributing to fatigue/infection, and also denies difficulty sleeping. Lungs are clear on exam, no tachycardia, abd soft _BS. May be mood related as appears slightly more withdrawn. Will continue to montior, discussed informing staff if any new physical symptoms develop    Continue program, for [facility] next week if stable    - pantoprazole added due to DAPT 1/3  - f/u CBC Monday 1/6  - still with borderline WBC (9.36 1/3, then on repeat 10.30. Monitor for symptoms given fatigue, discussed with patient    RECENT LABS    Vital Signs Last 24 Hrs  T(C): 36.8 (03 Jan 2025 08:57), Max: 37.2 (02 Jan 2025 14:29)  T(F): 98.2 (03 Jan 2025 08:57), Max: 98.9 (02 Jan 2025 14:29)  HR: 76 (03 Jan 2025 08:57) (71 - 77)  BP: 136/65 (03 Jan 2025 08:57) (136/65 - 168/74)  BP(mean): --  RR: 16 (03 Jan 2025 08:57) (14 - 16)  SpO2: 97% (03 Jan 2025 08:57) (92% - 97%)    Parameters below as of 03 Jan 2025 08:57  Patient On (Oxygen Delivery Method): room air                              13.4   10.30 )-----------( 323      ( 03 Jan 2025 08:58 )             40.3     01-03    140  |  105  |  19  ----------------------------<  113[H]  4.2   |  29  |  1.05    Ca    9.3      03 Jan 2025 05:20    TPro  7.7  /  Alb  3.1[L]  /  TBili  0.6  /  DBili  x   /  AST  27  /  ALT  51[H]  /  AlkPhos  62  01-03      Urinalysis Basic - ( 03 Jan 2025 05:20 )    Color: x / Appearance: x / SG: x / pH: x  Gluc: 113 mg/dL / Ketone: x  / Bili: x / Urobili: x   Blood: x / Protein: x / Nitrite: x   Leuk Esterase: x / RBC: x / WBC x   Sq Epi: x / Non Sq Epi: x / Bacteria: x      CAPILLARY BLOOD GLUCOSE

## 2025-01-03 NOTE — PROGRESS NOTE ADULT - ASSESSMENT
Rigo Da Silva is a 69-year-old male with a past medical history of HTN presenting with a one day history of left sided weakness and a ground level fall concerning for an acute ischemic stroke c/b rib fracture.     # Right pontine stroke  - Comprehensive Multidisciplinary Rehab   - AFO for left foot drop   - ambulate with kari walker  - aspirin, plavix and high intensity statin    #rib fx  -pain management per primary team    #HTN  - amlodipine 5mg po daily--> amlidipine 10 mg daily on 1/2  - monitor VS, check OH before adding any new meds. add lisinopril 5 if persistently above 140/90 and no OH    #leukocytosis  -no s/s of infection  -likely reactive  -repeat and trend    DVT ppx: Lovenox    d/w rehab team at [facility]   yes

## 2025-01-03 NOTE — PROGRESS NOTE ADULT - SUBJECTIVE AND OBJECTIVE BOX
Medicine Progress Note    Patient is a 69y old  Male who presents with a chief complaint of Right pontine stroke. (03 Jan 2025 11:17)      SUBJECTIVE / OVERNIGHT EVENTS:  seen and examined  Chart reviewed  No overnight events  Limb weakness improving with therapy  BM+  left rib pain controlled with meds  No complaints now      ROS:  denied fever/chills/CP/SOB/cough/palpitation/dizziness/abdominal pian/nausea/vomiting/diarrhoea/constipation/dysuria/leg or calf pain/headaches.all other ROS neg    MEDICATIONS  (STANDING):  amLODIPine   Tablet 10 milliGRAM(s) Oral daily  aspirin  chewable 81 milliGRAM(s) Oral daily  atorvastatin 80 milliGRAM(s) Oral at bedtime  clopidogrel Tablet 75 milliGRAM(s) Oral daily  enoxaparin Injectable 40 milliGRAM(s) SubCutaneous every 24 hours  lidocaine   4% Patch 1 Patch Transdermal daily  pantoprazole    Tablet 40 milliGRAM(s) Oral before breakfast  polyethylene glycol 3350 17 Gram(s) Oral daily    MEDICATIONS  (PRN):  acetaminophen     Tablet .. 650 milliGRAM(s) Oral every 6 hours PRN Mild Pain (1 - 3)  melatonin 3 milliGRAM(s) Oral at bedtime PRN Insomnia    CAPILLARY BLOOD GLUCOSE        I&O's Summary    02 Jan 2025 07:01  -  03 Jan 2025 07:00  --------------------------------------------------------  IN: 0 mL / OUT: 400 mL / NET: -400 mL        PHYSICAL EXAM:  Vital Signs Last 24 Hrs  T(C): 36.8 (03 Jan 2025 08:57), Max: 37.2 (02 Jan 2025 14:29)  T(F): 98.2 (03 Jan 2025 08:57), Max: 98.9 (02 Jan 2025 14:29)  HR: 76 (03 Jan 2025 08:57) (71 - 77)  BP: 136/65 (03 Jan 2025 08:57) (136/65 - 168/74)  BP(mean): --  RR: 16 (03 Jan 2025 08:57) (14 - 16)  SpO2: 97% (03 Jan 2025 08:57) (92% - 97%)    Parameters below as of 03 Jan 2025 08:57  Patient On (Oxygen Delivery Method): room air    GENERAL: Not in distress. Alert    HEENT: clear conjuctiva, MMM. no pallor or icterus  CARDIOVASCULAR: RRR S1, S2. No murmur/rubs/gallop  LUNGS: BLAE+, no rales, no wheezing, no rhonchi.    ABDOMEN: ND. Soft,  NT, no guarding / rebound / rigidity. BS normoactive  BACK: No spine tenderness.  EXTREMITIES: no edema. no leg or calf TP.  SKIN: warm and dry  PSYCHIATRIC: Calm.  No agitation.  CNS: AAO. moves limbs, follows commands. left sided weakness. dysarthria    LABS:                        13.4   10.30 )-----------( 323      ( 03 Jan 2025 08:58 )             40.3     01-03    140  |  105  |  19  ----------------------------<  113[H]  4.2   |  29  |  1.05    Ca    9.3      03 Jan 2025 05:20    TPro  7.7  /  Alb  3.1[L]  /  TBili  0.6  /  DBili  x   /  AST  27  /  ALT  51[H]  /  AlkPhos  62  01-03          Urinalysis Basic - ( 03 Jan 2025 05:20 )    Color: x / Appearance: x / SG: x / pH: x  Gluc: 113 mg/dL / Ketone: x  / Bili: x / Urobili: x   Blood: x / Protein: x / Nitrite: x   Leuk Esterase: x / RBC: x / WBC x   Sq Epi: x / Non Sq Epi: x / Bacteria: x        COVID-19 PCR: NotDetec (27 Dec 2024 22:20)      RADIOLOGY & ADDITIONAL TESTS:  Imaging from Last 24 Hours:    Electrocardiogram/QTc Interval:    COORDINATION OF CARE:  Care Discussed with Consultants/Other Providers:

## 2025-01-03 NOTE — CHART NOTE - NSCHARTNOTEFT_GEN_A_CORE
Nutrition Follow Up Note  Hospital Course   (Per Electronic Medical Record)    Source:  Patient [X]  Nursing Staff [X]   Medical Record [X]      Diet: Diet, Regular:   DASH/TLC {Sodium & Cholesterol Restricted} (DASH) (12-27-24 @ 16:58) [Active]    At this time patient tolerating diet w/ varied appetite/intake consuming % of meals. Patient reports feeling satisfied with meals, declined oral supplementation. No issues w/ dentition or chewing and swallowing current diet texture. Denies N/V/C/D, last BM 1/2 per nursing flowsheets.          Current Weight: 212.5lb on 12/27      Pertinent Medications: MEDICATIONS  (STANDING):  amLODIPine   Tablet 10 milliGRAM(s) Oral daily  aspirin  chewable 81 milliGRAM(s) Oral daily  atorvastatin 80 milliGRAM(s) Oral at bedtime  clopidogrel Tablet 75 milliGRAM(s) Oral daily  enoxaparin Injectable 40 milliGRAM(s) SubCutaneous every 24 hours  lidocaine   4% Patch 1 Patch Transdermal daily  pantoprazole    Tablet 40 milliGRAM(s) Oral before breakfast  polyethylene glycol 3350 17 Gram(s) Oral daily    MEDICATIONS  (PRN):  acetaminophen     Tablet .. 650 milliGRAM(s) Oral every 6 hours PRN Mild Pain (1 - 3)  melatonin 3 milliGRAM(s) Oral at bedtime PRN Insomnia      Pertinent Labs:  01-03 Na140 mmol/L Glu 113 mg/dL[H] K+ 4.2 mmol/L Cr  1.05 mg/dL BUN 19 mg/dL 01-03 Alb 3.1 g/dL[L] 12-26 Chol 193 mg/dL LDL --    HDL 48 mg/dL Trig 100 mg/dL        Skin: No pressure injury per nursing flowsheets    Edema: No edema noted per nursing flowsheet    Last Bowel Movement: on 1/2 Per nursing flowsheets     Estimated Needs:   [X] No Change Since Previous Assessment    Previous Nutrition Diagnosis:   Overweight/Obesity  2/2 presumed excessive energy intake  evidenced by BMI >30    Nutrition Diagnosis is [X] Ongoing     New Nutrition Diagnosis: [X] Not Applicable    Interventions:   1. Recommend continuing with current plan of care, diet consistency per SLP  2. Encourage PO intake  3. Obtain and honor food preferences as able  4. Ongoing diet education     Monitoring & Evaluation:   [X] Weights   [X] PO Intake   [X] Skin Integrity   [X] Follow Up (Per Protocol)  [X] Tolerance to Diet Prescription   [X] Other: Labs     Registered Dietitian/Nutritionist Remains Available.  Jasmin Renner RD    Phone# [phone]

## 2025-01-03 NOTE — PROGRESS NOTE ADULT - SUBJECTIVE AND OBJECTIVE BOX
Patient is a 69y old  Male who presents with a chief complaint of Right pontine stroke. (30 Dec 2024 14:32)      HPI:  Rigo Da Silva is a 69-year-old male with a past medical history of HTN presenting with a one day history of left sided weakness and a ground level fall concerning for an acute ischemic stroke and found to have a rib fracture. Code stroke was called and neurology consulted. NIHSS 4, LKW 12/23 2200. CT head with hypoattenuation in the right pontine location concerning for an acute ischemic stroke. Presented >24 hours from symptom onset. Likely 2/2 to intracranial atherosclerosis per neurology. PT/OT/PM&R evaluated patient and rec  acute inpatient rehab. Patient was optimized for discharge on 12/27 to [facility].    (27 Dec 2024 16:35)      SUBJECTIVE/ROS:      Patient was seen and evaluated at bedside.  Feels well, slightly fatigued similar to earlier this week but no other new sx.  NAD and tolerating therapies. No other acute primary complaints.  Denies any CP, SOB, LIVINGSTON, palpitations, fever, chills, body aches, cough, congestion, or any other symptoms at this time.       PAST MEDICAL & SURGICAL HISTORY:  HTN (hypertension)      Unilateral inguinal hernia, without obstruction or gangrene, not specified as recurrent      H/O hernia repair  right      History of surgery on arm  fx with metal      MEDICATIONS  (STANDING):  amLODIPine   Tablet 10 milliGRAM(s) Oral daily  aspirin  chewable 81 milliGRAM(s) Oral daily  atorvastatin 80 milliGRAM(s) Oral at bedtime  clopidogrel Tablet 75 milliGRAM(s) Oral daily  enoxaparin Injectable 40 milliGRAM(s) SubCutaneous every 24 hours  lidocaine   4% Patch 1 Patch Transdermal daily  pantoprazole    Tablet 40 milliGRAM(s) Oral before breakfast  polyethylene glycol 3350 17 Gram(s) Oral daily    MEDICATIONS  (PRN):  acetaminophen     Tablet .. 650 milliGRAM(s) Oral every 6 hours PRN Mild Pain (1 - 3)  melatonin 3 milliGRAM(s) Oral at bedtime PRN Insomnia          Allergies    No Known Allergies    Intolerances          VITALS  T(C): 36.8 (01-03-25 @ 08:57), Max: 37.2 (01-02-25 @ 14:29)  T(F): 98.2 (01-03-25 @ 08:57), Max: 98.9 (01-02-25 @ 14:29)  HR: 76 (01-03-25 @ 08:57) (71 - 77)  BP: 136/65 (01-03-25 @ 08:57) (136/65 - 168/74)  ABP: --  ABP(mean): --  RR: 16 (01-03-25 @ 08:57) (14 - 16)  SpO2: 97% (01-03-25 @ 08:57) (92% - 97%)        Daily     Daily         LAB                          13.4   10.30 )-----------( 323      ( 03 Jan 2025 08:58 )             40.3     01-03    140  |  105  |  19  ----------------------------<  113[H]  4.2   |  29  |  1.05    Ca    9.3      03 Jan 2025 05:20    TPro  7.7  /  Alb  3.1[L]  /  TBili  0.6  /  DBili  x   /  AST  27  /  ALT  51[H]  /  AlkPhos  62  01-03    LIVER FUNCTIONS - ( 03 Jan 2025 05:20 )  Alb: 3.1 g/dL / Pro: 7.7 g/dL / ALK PHOS: 62 U/L / ALT: 51 U/L / AST: 27 U/L / GGT: x                 Urinalysis Basic - ( 03 Jan 2025 05:20 )    Color: x / Appearance: x / SG: x / pH: x  Gluc: 113 mg/dL / Ketone: x  / Bili: x / Urobili: x   Blood: x / Protein: x / Nitrite: x   Leuk Esterase: x / RBC: x / WBC x   Sq Epi: x / Non Sq Epi: x / Bacteria: x                          CAPILLARY BLOOD GLUCOSE        PHYSICAL EXAM:   Constitutional - NAD, Comfortable  Chest - good chest expansion, good respiratory effort  Cardio - warm and well perfused  Abdomen -  Nondistended  Extremities - No peripheral edema  Neurologic Exam:                           Orientation: Awake, Alert  Psychiatric - Mood stable, Affect WNL           Patient is a 69y old  Male who presents with a chief complaint of Right pontine stroke. (30 Dec 2024 14:32)      HPI:  Rigo Da Silva is a 69-year-old male with a past medical history of HTN presenting with a one day history of left sided weakness and a ground level fall concerning for an acute ischemic stroke and found to have a rib fracture. Code stroke was called and neurology consulted. NIHSS 4, LKW 12/23 2200. CT head with hypoattenuation in the right pontine location concerning for an acute ischemic stroke. Presented >24 hours from symptom onset. Likely 2/2 to intracranial atherosclerosis per neurology. PT/OT/PM&R evaluated patient and rec  acute inpatient rehab. Patient was optimized for discharge on 12/27 to [facility].    (27 Dec 2024 16:35)      SUBJECTIVE/ROS:      Patient was seen and evaluated at bedside.  Feels well, slightly fatigued similar to earlier this week but no other new sx.  NAD and tolerating therapies. No other acute primary complaints.  Denies any CP, SOB, LIVINGSTON, palpitations, fever, chills, body aches, cough, congestion, or any other symptoms at this time.       PAST MEDICAL & SURGICAL HISTORY:  HTN (hypertension)      Unilateral inguinal hernia, without obstruction or gangrene, not specified as recurrent      H/O hernia repair  right      History of surgery on arm  fx with metal      MEDICATIONS  (STANDING):  amLODIPine   Tablet 10 milliGRAM(s) Oral daily  aspirin  chewable 81 milliGRAM(s) Oral daily  atorvastatin 80 milliGRAM(s) Oral at bedtime  clopidogrel Tablet 75 milliGRAM(s) Oral daily  enoxaparin Injectable 40 milliGRAM(s) SubCutaneous every 24 hours  lidocaine   4% Patch 1 Patch Transdermal daily  pantoprazole    Tablet 40 milliGRAM(s) Oral before breakfast  polyethylene glycol 3350 17 Gram(s) Oral daily    MEDICATIONS  (PRN):  acetaminophen     Tablet .. 650 milliGRAM(s) Oral every 6 hours PRN Mild Pain (1 - 3)  melatonin 3 milliGRAM(s) Oral at bedtime PRN Insomnia          Allergies    No Known Allergies    Intolerances          VITALS  T(C): 36.8 (01-03-25 @ 08:57), Max: 37.2 (01-02-25 @ 14:29)  T(F): 98.2 (01-03-25 @ 08:57), Max: 98.9 (01-02-25 @ 14:29)  HR: 76 (01-03-25 @ 08:57) (71 - 77)  BP: 136/65 (01-03-25 @ 08:57) (136/65 - 168/74)  ABP: --  ABP(mean): --  RR: 16 (01-03-25 @ 08:57) (14 - 16)  SpO2: 97% (01-03-25 @ 08:57) (92% - 97%)        Daily     Daily         LAB                          13.4   10.30 )-----------( 323      ( 03 Jan 2025 08:58 )             40.3     01-03    140  |  105  |  19  ----------------------------<  113[H]  4.2   |  29  |  1.05    Ca    9.3      03 Jan 2025 05:20    TPro  7.7  /  Alb  3.1[L]  /  TBili  0.6  /  DBili  x   /  AST  27  /  ALT  51[H]  /  AlkPhos  62  01-03    LIVER FUNCTIONS - ( 03 Jan 2025 05:20 )  Alb: 3.1 g/dL / Pro: 7.7 g/dL / ALK PHOS: 62 U/L / ALT: 51 U/L / AST: 27 U/L / GGT: x                 Urinalysis Basic - ( 03 Jan 2025 05:20 )    Color: x / Appearance: x / SG: x / pH: x  Gluc: 113 mg/dL / Ketone: x  / Bili: x / Urobili: x   Blood: x / Protein: x / Nitrite: x   Leuk Esterase: x / RBC: x / WBC x   Sq Epi: x / Non Sq Epi: x / Bacteria: x                          CAPILLARY BLOOD GLUCOSE        PHYSICAL EXAM:   Constitutional - NAD, Comfortable  Chest - good chest expansion, good respiratory effort  Cardio - warm and well perfused  Abdomen -  Nondistended  Extremities - No peripheral edema  Neurologic Exam:                           Orientation: Awake, Alert  Psychiatric - Mood stable, Affect WNL

## 2025-01-04 PROCEDURE — 99232 SBSQ HOSP IP/OBS MODERATE 35: CPT

## 2025-01-04 RX ADMIN — CLOPIDOGREL BISULFATE 75 MILLIGRAM(S): 75 TABLET, FILM COATED ORAL at 12:32

## 2025-01-04 RX ADMIN — LIDOCAINE 1 PATCH: 50 OINTMENT TOPICAL at 21:19

## 2025-01-04 RX ADMIN — Medication 10 MILLIGRAM(S): at 05:49

## 2025-01-04 RX ADMIN — ACETAMINOPHEN 650 MILLIGRAM(S): 80 SOLUTION/ DROPS ORAL at 12:42

## 2025-01-04 RX ADMIN — Medication 81 MILLIGRAM(S): at 12:31

## 2025-01-04 RX ADMIN — LIDOCAINE 1 PATCH: 50 OINTMENT TOPICAL at 12:30

## 2025-01-04 RX ADMIN — PANTOPRAZOLE 40 MILLIGRAM(S): 40 TABLET, DELAYED RELEASE ORAL at 05:49

## 2025-01-04 RX ADMIN — ENOXAPARIN SODIUM 40 MILLIGRAM(S): 60 INJECTION INTRAVENOUS; SUBCUTANEOUS at 05:49

## 2025-01-04 RX ADMIN — ATORVASTATIN CALCIUM 80 MILLIGRAM(S): 40 TABLET, FILM COATED ORAL at 21:22

## 2025-01-04 RX ADMIN — Medication 17 GRAM(S): at 12:32

## 2025-01-04 RX ADMIN — Medication 10 MILLIGRAM(S): at 12:36

## 2025-01-04 NOTE — PROGRESS NOTE ADULT - ASSESSMENT
69M with  HTN   admit with Lt HP, ischemic CVA      Ischemic CVA  - Rehab per pmr  - aspirin, plavix and high intensity statin    rib fx  -pain management per primary team    HTN  - amlidipine 10 mg daily, lisinopril 5  - titrate as tolerated      DVT ppx: Lovenox    d/w rehab team at [facility]

## 2025-01-04 NOTE — PROGRESS NOTE ADULT - SUBJECTIVE AND OBJECTIVE BOX
Patient is a 69y old  Male who presents with a chief complaint of Right pontine stroke. (03 Jan 2025 14:10)      HPI:  Rigo Da Silva is a 69-year-old male with a past medical history of HTN presenting with a one day history of left sided weakness and a ground level fall concerning for an acute ischemic stroke and found to have a rib fracture. Code stroke was called and neurology consulted. NIHSS 4, LKW 12/23 2200. CT head with hypoattenuation in the right pontine location concerning for an acute ischemic stroke. Presented >24 hours from symptom onset. Likely 2/2 to intracranial atherosclerosis per neurology. PT/OT/PM&R evaluated patient and rec  acute inpatient rehab. Patient was optimized for discharge on 12/27 to [facility].    (27 Dec 2024 16:35)      PAST MEDICAL & SURGICAL HISTORY:  HTN (hypertension)      Unilateral inguinal hernia, without obstruction or gangrene, not specified as recurrent      H/O hernia repair  right      History of surgery on arm  fx with metal          MEDICATIONS  (STANDING):  amLODIPine   Tablet 10 milliGRAM(s) Oral daily  aspirin  chewable 81 milliGRAM(s) Oral daily  atorvastatin 80 milliGRAM(s) Oral at bedtime  clopidogrel Tablet 75 milliGRAM(s) Oral daily  enoxaparin Injectable 40 milliGRAM(s) SubCutaneous every 24 hours  lidocaine   4% Patch 1 Patch Transdermal daily  pantoprazole    Tablet 40 milliGRAM(s) Oral before breakfast  polyethylene glycol 3350 17 Gram(s) Oral daily    MEDICATIONS  (PRN):  acetaminophen     Tablet .. 650 milliGRAM(s) Oral every 6 hours PRN Mild Pain (1 - 3)  melatonin 3 milliGRAM(s) Oral at bedtime PRN Insomnia      Allergies    No Known Allergies    Intolerances          VITALS  69y  Vital Signs Last 24 Hrs  T(C): 37.1 (04 Jan 2025 05:45), Max: 37.1 (04 Jan 2025 05:45)  T(F): 98.7 (04 Jan 2025 05:45), Max: 98.7 (04 Jan 2025 05:45)  HR: 75 (04 Jan 2025 05:45) (75 - 77)  BP: 145/78 (04 Jan 2025 05:45) (143/79 - 145/78)  BP(mean): --  RR: 16 (04 Jan 2025 05:45) (16 - 16)  SpO2: 96% (04 Jan 2025 05:45) (93% - 96%)    Parameters below as of 04 Jan 2025 05:45  Patient On (Oxygen Delivery Method): room air      Daily     Daily         RECENT LABS:                          13.4   10.30 )-----------( 323      ( 03 Jan 2025 08:58 )             40.3     01-03    140  |  105  |  19  ----------------------------<  113[H]  4.2   |  29  |  1.05    Ca    9.3      03 Jan 2025 05:20    TPro  7.7  /  Alb  3.1[L]  /  TBili  0.6  /  DBili  x   /  AST  27  /  ALT  51[H]  /  AlkPhos  62  01-03    LIVER FUNCTIONS - ( 03 Jan 2025 05:20 )  Alb: 3.1 g/dL / Pro: 7.7 g/dL / ALK PHOS: 62 U/L / ALT: 51 U/L / AST: 27 U/L / GGT: x             Urinalysis Basic - ( 03 Jan 2025 05:20 )    Color: x / Appearance: x / SG: x / pH: x  Gluc: 113 mg/dL / Ketone: x  / Bili: x / Urobili: x   Blood: x / Protein: x / Nitrite: x   Leuk Esterase: x / RBC: x / WBC x   Sq Epi: x / Non Sq Epi: x / Bacteria: x          CAPILLARY BLOOD GLUCOSE

## 2025-01-04 NOTE — PROGRESS NOTE ADULT - ASSESSMENT
Rigo Da Silva is a 69-year-old male with a past medical history of HTN presenting with a one day history of left sided weakness and a ground level fall , found to have pontine CVA c/b rib fracture.     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 FES to left shoulder, bicep in therapy  - begin trial prozac 10 mg for motor recovery, discussed with patient who is agreeable 1/4  - add psychology support for adjustment symptoms  - left foot drop: AFO evaluation at [facility] to allow for continued motor recovery  - Precautions: cardiac, fall, aspiration    # rib fractures  - Incentive spirometry  - pain mgt as below    # HTN  - amlodipine 5 mg daily  - BP  (143/79 - 145/78) 1/4    #asymptomatic drop in hgb  - hgb 14 --> 12.7 1/3-->  13.4 repeat 1/3  - CBC 1/6    # borderline leukocytosis  - fatigue, but otherwise asymptomatic, no fever  - WBC 10.3 1/3    # mild transaminitis  - slight elevation in AST 51 1/3  - will monitor. LFTs 1/6    # Sleep:  - PRN 3mg melatonin    # Pain Management:  - Tylenol PRN  - lidocaine patch prn  - ibuprofen prn    # GI/Bowel:  - Senna QHS  - Miralax daily PRN  - patnoprazole added due to DAPT 1/3    # /Bladder:   - Voiding well    # Diet:   - regular with thins     # DVT ppx:  - Lovenox 40 mg SQ     # Case discussed in IDT rounds 12/30 (initial)  - continent B/B, skin intact 2nd floor 2 family home +stairs, regular solid thin liquids, mild cognitive impairment in memory and executive function, mod assist bed mobility, max toileting hygiene, max foot wear, max toilet transfers, mod assist stand pivot, ambulates 10 feet with HR and WC follow  - barriers: lives alone, rib pain, steps, left HP, no support on dc, cognitive impairment  - goals: supervision transfers and ambulation household level with cane, 8-12 steps CS, Cs/supervision Talita "to become independent and return back to work" "Remberto" Recall therapy recommendations independently, supervision transfers CS  - target: 1/10 to [facility]ANSICO    # LABS  CBC CMP 1/6        ---------------  Outpatient Follow-up:    Marquise Fraser)  Neurology  [address] [facility], [address]  [place] [address]  Phone: [phone]  Fax: [phone]  Follow Up Time:     Your Primary Care Provider,     Demetri Lee  Cardiac Electrophysiology  [address] [place] [address], [address]  [place] [address]  Phone: [phone]  Fax: [phone]  Follow Up Time:  --------------

## 2025-01-04 NOTE — PROGRESS NOTE ADULT - SUBJECTIVE AND OBJECTIVE BOX
|------------------------------------------|                    Primary issue  |------------------------------------------|    FU Ischemic CVA      |-------------------------------------------|                       Subjective   |-------------------------------------------|    No events overnight    |------------------------------------------|                       Objective  |------------------------------------------|    Vital Signs Last 24 Hrs  T(F): 98.7 (04 Jan 2025 05:45), Max: 98.7 (04 Jan 2025 05:45)  HR: 75 (04 Jan 2025 05:45) (75 - 77)  BP: 145/78 (04 Jan 2025 05:45) (143/79 - 145/78)  RR: 16 (04 Jan 2025 05:45) (16 - 16)  SpO2: 96% (04 Jan 2025 05:45) (93% - 96%)  I&O's Summary      Examination:   General: NAD, Comfortable  Pulm: CTA BL No Rhonchi Rales or wheezes  Neck: Supple, No JVD  CVS: RRR No rubs murmurs or gallops  Abdomen: Soft, Nontender, Nondistended; No masses or organomegally  Extremities: No calf tenderness, No pitting edema    Labs:                        13.4   10.30 )-----------( 323      ( 03 Jan 2025 08:58 )             40.3       01-03    140  |  105  |  19  ----------------------------<  113  4.2   |  29  |  1.05    Ca    9.3      03 Jan 2025 05:20    TPro  7.7  /  Alb  3.1  /  TBili  0.6  /  DBili  x   /  AST  27  /  ALT  51  /  AlkPhos  62  01-03

## 2025-01-05 PROCEDURE — 99232 SBSQ HOSP IP/OBS MODERATE 35: CPT

## 2025-01-05 RX ADMIN — Medication 10 MILLIGRAM(S): at 11:41

## 2025-01-05 RX ADMIN — LIDOCAINE 1 PATCH: 50 OINTMENT TOPICAL at 18:36

## 2025-01-05 RX ADMIN — CLOPIDOGREL BISULFATE 75 MILLIGRAM(S): 75 TABLET, FILM COATED ORAL at 11:42

## 2025-01-05 RX ADMIN — Medication 17 GRAM(S): at 11:42

## 2025-01-05 RX ADMIN — Medication 10 MILLIGRAM(S): at 05:37

## 2025-01-05 RX ADMIN — LIDOCAINE 1 PATCH: 50 OINTMENT TOPICAL at 05:32

## 2025-01-05 RX ADMIN — Medication 81 MILLIGRAM(S): at 11:41

## 2025-01-05 RX ADMIN — LIDOCAINE 1 PATCH: 50 OINTMENT TOPICAL at 21:46

## 2025-01-05 RX ADMIN — ENOXAPARIN SODIUM 40 MILLIGRAM(S): 60 INJECTION INTRAVENOUS; SUBCUTANEOUS at 05:36

## 2025-01-05 RX ADMIN — ATORVASTATIN CALCIUM 80 MILLIGRAM(S): 40 TABLET, FILM COATED ORAL at 21:49

## 2025-01-05 RX ADMIN — LIDOCAINE 1 PATCH: 50 OINTMENT TOPICAL at 11:45

## 2025-01-05 NOTE — PROGRESS NOTE ADULT - ASSESSMENT
Rigo Da Silva is a 69-year-old male with a past medical history of HTN presenting with a one day history of left sided weakness and a ground level fall , found to have pontine CVA c/b rib fracture.     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 FES to left shoulder, bicep in therapy  - begin trial prozac 10 mg for motor recovery, discussed with patient who is agreeable 1/4. Tolerating reviewed again 1/5  - add psychology support for adjustment symptoms  - left foot drop: AFO evaluation at [facility] to allow for continued motor recovery  - Precautions: cardiac, fall, aspiration    # rib fractures  - Incentive spirometry  - pain mgt as below    # HTN  - amlodipine 5 mg daily  - BP   (132/80 - 137/74)1/5    #asymptomatic drop in hgb  - hgb 14 --> 12.7 1/3-->  13.4 repeat 1/3  - CBC 1/6    # borderline leukocytosis  - fatigue, but otherwise asymptomatic, no fever  - WBC 10.3 1/3. CBC 1/6    # mild transaminitis  - slight elevation in AST 51 1/3  - will monitor. LFTs 1/6    # Sleep:  - PRN 3mg melatonin    # Pain Management:  - Tylenol PRN  - lidocaine patch prn  - ibuprofen prn    # GI/Bowel:  - Senna QHS  - Miralax daily PRN  - patnoprazole added due to DAPT 1/3    # /Bladder:   - Voiding well    # Diet:   - regular with thins     # DVT ppx:  - Lovenox 40 mg SQ     # Case discussed in IDT rounds 12/30 (initial)  - continent B/B, skin intact 2nd floor 2 family home +stairs, regular solid thin liquids, mild cognitive impairment in memory and executive function, mod assist bed mobility, max toileting hygiene, max foot wear, max toilet transfers, mod assist stand pivot, ambulates 10 feet with HR and WC follow  - barriers: lives alone, rib pain, steps, left HP, no support on dc, cognitive impairment  - goals: supervision transfers and ambulation household level with cane, 8-12 steps CS, Cs/supervision [email or web address] "to become independent and return back to work" "Remberto" Recall therapy recommendations independently, supervision transfers CS  - target: 1/10 to [facility]    # LABS  CBC CMP 1/6        ---------------  Outpatient Follow-up:    Marquise Fraser)  Neurology  [address] [facility], [address]  [place] [address]  Phone: [phone]  Fax: [phone]  Follow Up Time:     Your Primary Care Provider,     Demetri Lee  Cardiac Electrophysiology  [address] [place] [address], [address]  [place] [address]  Phone: [phone]  Fax: [phone]  Follow Up Time:  --------------

## 2025-01-05 NOTE — PROGRESS NOTE ADULT - ASSESSMENT
69M with  HTN   admit with Lt HP, ischemic CVA      Ischemic CVA  - Rehab per pmr  - aspirin, plavix and high intensity statin    rib fx  -pain management per primary team    HTN  - amlidipine 10 mg daily, lisinopril 5  - cont meds      DVT ppx: Lovenox    d/w rehab team at [facility]

## 2025-01-05 NOTE — PROGRESS NOTE ADULT - SUBJECTIVE AND OBJECTIVE BOX
Patient is a 69y old  Male who presents with a chief complaint of Right pontine stroke. (04 Jan 2025 14:30)      HPI:  Rigo Da Silva is a 69-year-old male with a past medical history of HTN presenting with a one day history of left sided weakness and a ground level fall concerning for an acute ischemic stroke and found to have a rib fracture. Code stroke was called and neurology consulted. NIHSS 4, LKW 12/23 2200. CT head with hypoattenuation in the right pontine location concerning for an acute ischemic stroke. Presented >24 hours from symptom onset. Likely 2/2 to intracranial atherosclerosis per neurology. PT/OT/PM&R evaluated patient and rec  acute inpatient rehab. Patient was optimized for discharge on 12/27 to [facility].    (27 Dec 2024 16:35)      PAST MEDICAL & SURGICAL HISTORY:  HTN (hypertension)      Unilateral inguinal hernia, without obstruction or gangrene, not specified as recurrent      H/O hernia repair  right      History of surgery on arm  fx with metal          MEDICATIONS  (STANDING):  amLODIPine   Tablet 10 milliGRAM(s) Oral daily  aspirin  chewable 81 milliGRAM(s) Oral daily  atorvastatin 80 milliGRAM(s) Oral at bedtime  clopidogrel Tablet 75 milliGRAM(s) Oral daily  enoxaparin Injectable 40 milliGRAM(s) SubCutaneous every 24 hours  FLUoxetine 10 milliGRAM(s) Oral daily  lidocaine   4% Patch 1 Patch Transdermal daily  pantoprazole    Tablet 40 milliGRAM(s) Oral before breakfast  polyethylene glycol 3350 17 Gram(s) Oral daily    MEDICATIONS  (PRN):  acetaminophen     Tablet .. 650 milliGRAM(s) Oral every 6 hours PRN Mild Pain (1 - 3)  melatonin 3 milliGRAM(s) Oral at bedtime PRN Insomnia      Allergies    No Known Allergies    Intolerances          VITALS  69y  Vital Signs Last 24 Hrs  T(C): 36.8 (04 Jan 2025 19:00), Max: 37.2 (04 Jan 2025 17:45)  T(F): 98.3 (04 Jan 2025 19:00), Max: 98.9 (04 Jan 2025 17:45)  HR: 64 (04 Jan 2025 19:00) (64 - 79)  BP: 137/74 (04 Jan 2025 19:00) (132/80 - 137/74)  BP(mean): --  RR: 16 (04 Jan 2025 19:00) (16 - 16)  SpO2: 97% (04 Jan 2025 19:00) (97% - 98%)    Parameters below as of 04 Jan 2025 19:00  Patient On (Oxygen Delivery Method): room air      Daily     Daily         RECENT LABS:                      CAPILLARY BLOOD GLUCOSE

## 2025-01-06 LAB
ALBUMIN SERPL ELPH-MCNC: 2.9 G/DL — LOW (ref 3.3–5)
ALP SERPL-CCNC: 65 U/L — SIGNIFICANT CHANGE UP (ref 40–120)
ALT FLD-CCNC: 35 U/L — SIGNIFICANT CHANGE UP (ref 10–45)
ANION GAP SERPL CALC-SCNC: 7 MMOL/L — SIGNIFICANT CHANGE UP (ref 5–17)
AST SERPL-CCNC: 21 U/L — SIGNIFICANT CHANGE UP (ref 10–40)
BILIRUB SERPL-MCNC: 0.6 MG/DL — SIGNIFICANT CHANGE UP (ref 0.2–1.2)
BUN SERPL-MCNC: 16 MG/DL — SIGNIFICANT CHANGE UP (ref 7–23)
CALCIUM SERPL-MCNC: 9.3 MG/DL — SIGNIFICANT CHANGE UP (ref 8.4–10.5)
CHLORIDE SERPL-SCNC: 103 MMOL/L — SIGNIFICANT CHANGE UP (ref 96–108)
CO2 SERPL-SCNC: 29 MMOL/L — SIGNIFICANT CHANGE UP (ref 22–31)
CREAT SERPL-MCNC: 1.05 MG/DL — SIGNIFICANT CHANGE UP (ref 0.5–1.3)
EGFR: 77 ML/MIN/1.73M2 — SIGNIFICANT CHANGE UP
GLUCOSE SERPL-MCNC: 105 MG/DL — HIGH (ref 70–99)
HCT VFR BLD CALC: 37.3 % — LOW (ref 39–50)
HGB BLD-MCNC: 12.6 G/DL — LOW (ref 13–17)
MCHC RBC-ENTMCNC: 29.1 PG — SIGNIFICANT CHANGE UP (ref 27–34)
MCHC RBC-ENTMCNC: 33.8 G/DL — SIGNIFICANT CHANGE UP (ref 32–36)
MCV RBC AUTO: 86.1 FL — SIGNIFICANT CHANGE UP (ref 80–100)
NRBC # BLD: 0 /100 WBCS — SIGNIFICANT CHANGE UP (ref 0–0)
PLATELET # BLD AUTO: 342 K/UL — SIGNIFICANT CHANGE UP (ref 150–400)
POTASSIUM SERPL-MCNC: 4.4 MMOL/L — SIGNIFICANT CHANGE UP (ref 3.5–5.3)
POTASSIUM SERPL-SCNC: 4.4 MMOL/L — SIGNIFICANT CHANGE UP (ref 3.5–5.3)
PROT SERPL-MCNC: 7.8 G/DL — SIGNIFICANT CHANGE UP (ref 6–8.3)
RBC # BLD: 4.33 M/UL — SIGNIFICANT CHANGE UP (ref 4.2–5.8)
RBC # FLD: 11.3 % — SIGNIFICANT CHANGE UP (ref 10.3–14.5)
SODIUM SERPL-SCNC: 139 MMOL/L — SIGNIFICANT CHANGE UP (ref 135–145)
WBC # BLD: 8.69 K/UL — SIGNIFICANT CHANGE UP (ref 3.8–10.5)
WBC # FLD AUTO: 8.69 K/UL — SIGNIFICANT CHANGE UP (ref 3.8–10.5)

## 2025-01-06 PROCEDURE — 99232 SBSQ HOSP IP/OBS MODERATE 35: CPT | Mod: GC

## 2025-01-06 PROCEDURE — 99232 SBSQ HOSP IP/OBS MODERATE 35: CPT

## 2025-01-06 PROCEDURE — 90832 PSYTX W PT 30 MINUTES: CPT

## 2025-01-06 RX ADMIN — Medication 10 MILLIGRAM(S): at 11:07

## 2025-01-06 RX ADMIN — PANTOPRAZOLE 40 MILLIGRAM(S): 40 TABLET, DELAYED RELEASE ORAL at 05:47

## 2025-01-06 RX ADMIN — LIDOCAINE 1 PATCH: 50 OINTMENT TOPICAL at 19:40

## 2025-01-06 RX ADMIN — LIDOCAINE 1 PATCH: 50 OINTMENT TOPICAL at 21:32

## 2025-01-06 RX ADMIN — ATORVASTATIN CALCIUM 80 MILLIGRAM(S): 40 TABLET, FILM COATED ORAL at 21:33

## 2025-01-06 RX ADMIN — CLOPIDOGREL BISULFATE 75 MILLIGRAM(S): 75 TABLET, FILM COATED ORAL at 11:08

## 2025-01-06 RX ADMIN — LIDOCAINE 1 PATCH: 50 OINTMENT TOPICAL at 11:11

## 2025-01-06 RX ADMIN — ENOXAPARIN SODIUM 40 MILLIGRAM(S): 60 INJECTION INTRAVENOUS; SUBCUTANEOUS at 05:48

## 2025-01-06 RX ADMIN — Medication 10 MILLIGRAM(S): at 05:48

## 2025-01-06 RX ADMIN — Medication 81 MILLIGRAM(S): at 11:07

## 2025-01-06 RX ADMIN — Medication 17 GRAM(S): at 11:08

## 2025-01-06 NOTE — PROGRESS NOTE ADULT - SUBJECTIVE AND OBJECTIVE BOX
Pt was seen for 30 minutes for supportive tx. Pt c/o anxiety, worry. Reviewed chart, approached Pt for an initial consult, introduced the role of neuropsychology in the rehab team, and explained the nature and purpose of the consult. Pt is a 70 y/o male with a past medical history of HTN presenting with a one day history of left sided weakness and a ground level fall concerning for an acute ischemic stroke c/b rib fracture. Pt agreed to participate; he was well-related and candid in discussing his present medical condition. Pt appeared mildly confused, but was pleasant and talkative. Session focused on establishing rapport with Pt, gathering relevant biographical information, and assessing his/her current emotional functioning. Pt was able to provide sufficient information about his medical hx and social hx. Pt answered all questions during the clinical interview in order to elicit emotional symptoms. Additionally, Pt reported experiencing anxiety, worry and helplessness within the past week. Pt expressed concern about his son who is incarcerated in [place], as well as grief for his ex-wife who passed away shortly before his son's arrest. Provided education on CVA rehabilitation issues. Support and encouragement were provided.

## 2025-01-06 NOTE — PROGRESS NOTE ADULT - SUBJECTIVE AND OBJECTIVE BOX
Patient is a 69y old  Male who presents with a chief complaint of Right pontine stroke.     seen at the bedside, no overnight events, no n/v, no sob, no headache or dizziness      Vital Signs Last 24 Hrs  T(C): 36.9 (06 Jan 2025 07:37), Max: 36.9 (05 Jan 2025 20:10)  T(F): 98.4 (06 Jan 2025 07:37), Max: 98.5 (05 Jan 2025 20:10)  HR: 69 (06 Jan 2025 07:37) (66 - 70)  BP: 121/63 (06 Jan 2025 07:37) (121/63 - 152/69)  BP(mean): --  RR: 16 (06 Jan 2025 07:37) (16 - 16)  SpO2: 95% (06 Jan 2025 07:37) (94% - 95%)    Parameters below as of 06 Jan 2025 07:37  Patient On (Oxygen Delivery Method): room air    GENERAL- NAD  EAR/NOSE/MOUTH/THROAT -  MMM  EYES- REEMA, conjunctiva and Sclera clear  NECK- supple  RESPIRATORY-  clear to auscultation bilaterally  CARDIOVASCULAR - SIS2, RRR  GI - soft NT BS present  EXTREMITIES- no pedal edema  NEUROLOGY- LSW                12.6                 139  | 29   | 16           8.69  >-----------< 342     ------------------------< 105                   37.3                 4.4  | 103  | 1.05                                         Ca 9.3   Mg x     Ph x          MEDICATIONS  (STANDING):  amLODIPine   Tablet 10 milliGRAM(s) Oral daily  aspirin  chewable 81 milliGRAM(s) Oral daily  atorvastatin 80 milliGRAM(s) Oral at bedtime  clopidogrel Tablet 75 milliGRAM(s) Oral daily  enoxaparin Injectable 40 milliGRAM(s) SubCutaneous every 24 hours  FLUoxetine 10 milliGRAM(s) Oral daily  lidocaine   4% Patch 1 Patch Transdermal daily  pantoprazole    Tablet 40 milliGRAM(s) Oral before breakfast  polyethylene glycol 3350 17 Gram(s) Oral daily    MEDICATIONS  (PRN):  acetaminophen     Tablet .. 650 milliGRAM(s) Oral every 6 hours PRN Mild Pain (1 - 3)  melatonin 3 milliGRAM(s) Oral at bedtime PRN Insomnia

## 2025-01-06 NOTE — PROGRESS NOTE ADULT - SUBJECTIVE AND OBJECTIVE BOX
Patient is a 69y old  Male who presents with a chief complaint of Right pontine stroke. (04 Jan 2025 14:30)      HPI:  Rigo Da Silva is a 69-year-old male with a past medical history of HTN presenting with a one day history of left sided weakness and a ground level fall concerning for an acute ischemic stroke and found to have a rib fracture. Code stroke was called and neurology consulted. NIHSS 4, LKW 12/23 2200. CT head with hypoattenuation in the right pontine location concerning for an acute ischemic stroke. Presented >24 hours from symptom onset. Likely 2/2 to intracranial atherosclerosis per neurology. PT/OT/PM&R evaluated patient and rec  acute inpatient rehab. Patient was optimized for discharge on 12/27 to [facility].    (27 Dec 2024 16:35)      PAST MEDICAL & SURGICAL HISTORY:  HTN (hypertension)      Unilateral inguinal hernia, without obstruction or gangrene, not specified as recurrent      H/O hernia repair  right      History of surgery on arm  fx with metal          MEDICATIONS  (STANDING):  amLODIPine   Tablet 10 milliGRAM(s) Oral daily  aspirin  chewable 81 milliGRAM(s) Oral daily  atorvastatin 80 milliGRAM(s) Oral at bedtime  clopidogrel Tablet 75 milliGRAM(s) Oral daily  enoxaparin Injectable 40 milliGRAM(s) SubCutaneous every 24 hours  FLUoxetine 10 milliGRAM(s) Oral daily  lidocaine   4% Patch 1 Patch Transdermal daily  pantoprazole    Tablet 40 milliGRAM(s) Oral before breakfast  polyethylene glycol 3350 17 Gram(s) Oral daily    MEDICATIONS  (PRN):  acetaminophen     Tablet .. 650 milliGRAM(s) Oral every 6 hours PRN Mild Pain (1 - 3)  melatonin 3 milliGRAM(s) Oral at bedtime PRN Insomnia      Allergies    No Known Allergies    Intolerances          VITALS  69y  Vital Signs Last 24 Hrs  T(C): 36.9 (06 Jan 2025 07:37), Max: 36.9 (05 Jan 2025 20:10)  T(F): 98.4 (06 Jan 2025 07:37), Max: 98.5 (05 Jan 2025 20:10)  HR: 69 (06 Jan 2025 07:37) (66 - 70)  BP: 121/63 (06 Jan 2025 07:37) (121/63 - 152/69)  BP(mean): --  RR: 16 (06 Jan 2025 07:37) (16 - 16)  SpO2: 95% (06 Jan 2025 07:37) (94% - 95%)    Parameters below as of 06 Jan 2025 07:37  Patient On (Oxygen Delivery Method): room air          Daily     Daily         RECENT LABS:                          12.6   8.69  )-----------( 342      ( 06 Jan 2025 06:05 )             37.3     01-06    139  |  103  |  16  ----------------------------<  105[H]  4.4   |  29  |  1.05    Ca    9.3      06 Jan 2025 06:05    TPro  7.8  /  Alb  2.9[L]  /  TBili  0.6  /  DBili  x   /  AST  21  /  ALT  35  /  AlkPhos  65  01-06    LIVER FUNCTIONS - ( 06 Jan 2025 06:05 )  Alb: 2.9 g/dL / Pro: 7.8 g/dL / ALK PHOS: 65 U/L / ALT: 35 U/L / AST: 21 U/L / GGT: x                 Urinalysis Basic - ( 06 Jan 2025 06:05 )    Color: x / Appearance: x / SG: x / pH: x  Gluc: 105 mg/dL / Ketone: x  / Bili: x / Urobili: x   Blood: x / Protein: x / Nitrite: x   Leuk Esterase: x / RBC: x / WBC x   Sq Epi: x / Non Sq Epi: x / Bacteria: x    CAPILLARY BLOOD GLUCOSE          Review of Systems:   · Additional ROS: 	Patient seen in bed at bedside in good spirits. Left upper ext 1/5 in sling. He reports that he slept better last night. BM yesterday. voiding without incident. Pt able to answer questions appropriately. Pt reports rib pain relieved by current pain med reg. Incentive education provided and importance reinforced. Pt denies headache Chest pain, SOB, abdomin pain NVD, dysuria     Physical Exam:   · Constitutional Comments	alert, mood stable. In good spirits today  · Respiratory	clear to auscultation bilaterally; no wheezes; no rales; no rhonchi  · Cardiovascular	regular rate and rhythm; S1 S2 present; no gallops; no rub; no murmur  · Gastrointestinal	soft; nontender  · Motor	left UE flaccid 0/5 shoulder, elbow flexion, grasp  no pain with ROM  no hand swelling  calves soft no TTP             Patient is a 69y old  Male who presents with a chief complaint of Right pontine stroke. (04 Jan 2025 14:30)      HPI:  Rigo Da Silva is a 69-year-old male with a past medical history of HTN presenting with a one day history of left sided weakness and a ground level fall concerning for an acute ischemic stroke and found to have a rib fracture. Code stroke was called and neurology consulted. NIHSS 4, LKW 12/23 2200. CT head with hypoattenuation in the right pontine location concerning for an acute ischemic stroke. Presented >24 hours from symptom onset. Likely 2/2 to intracranial atherosclerosis per neurology. PT/OT/PM&R evaluated patient and rec  acute inpatient rehab. Patient was optimized for discharge on 12/27 to [facility].    (27 Dec 2024 16:35)      PAST MEDICAL & SURGICAL HISTORY:  HTN (hypertension)      Unilateral inguinal hernia, without obstruction or gangrene, not specified as recurrent      H/O hernia repair  right      History of surgery on arm  fx with metal          MEDICATIONS  (STANDING):  amLODIPine   Tablet 10 milliGRAM(s) Oral daily  aspirin  chewable 81 milliGRAM(s) Oral daily  atorvastatin 80 milliGRAM(s) Oral at bedtime  clopidogrel Tablet 75 milliGRAM(s) Oral daily  enoxaparin Injectable 40 milliGRAM(s) SubCutaneous every 24 hours  FLUoxetine 10 milliGRAM(s) Oral daily  lidocaine   4% Patch 1 Patch Transdermal daily  pantoprazole    Tablet 40 milliGRAM(s) Oral before breakfast  polyethylene glycol 3350 17 Gram(s) Oral daily    MEDICATIONS  (PRN):  acetaminophen     Tablet .. 650 milliGRAM(s) Oral every 6 hours PRN Mild Pain (1 - 3)  melatonin 3 milliGRAM(s) Oral at bedtime PRN Insomnia      Allergies    No Known Allergies    Intolerances          VITALS  69y  Vital Signs Last 24 Hrs  T(C): 36.9 (06 Jan 2025 07:37), Max: 36.9 (05 Jan 2025 20:10)  T(F): 98.4 (06 Jan 2025 07:37), Max: 98.5 (05 Jan 2025 20:10)  HR: 69 (06 Jan 2025 07:37) (66 - 70)  BP: 121/63 (06 Jan 2025 07:37) (121/63 - 152/69)  RR: 16 (06 Jan 2025 07:37) (16 - 16)  SpO2: 95% (06 Jan 2025 07:37) (94% - 95%)    Parameters below as of 06 Jan 2025 07:37  Patient On (Oxygen Delivery Method): room air        RECENT LABS:                          12.6 (L)  8.69  )-----------( 342      ( 06 Jan 2025 06:05 )             37.3     01-06    139  |  103  |  16  ----------------------------<  105[H]  4.4   |  29  |  1.05    Ca    9.3      06 Jan 2025 06:05    TPro  7.8  /  Alb  2.9[L]  /  TBili  0.6  /  DBili  x   /  AST  21  /  ALT  35  /  AlkPhos  65  01-06    LIVER FUNCTIONS - ( 06 Jan 2025 06:05 )  Alb: 2.9 g/dL / Pro: 7.8 g/dL / ALK PHOS: 65 U/L / ALT: 35 U/L / AST: 21 U/L / GGT: x                 Urinalysis Basic - ( 06 Jan 2025 06:05 )    Color: x / Appearance: x / SG: x / pH: x  Gluc: 105 mg/dL / Ketone: x  / Bili: x / Urobili: x   Blood: x / Protein: x / Nitrite: x   Leuk Esterase: x / RBC: x / WBC x   Sq Epi: x / Non Sq Epi: x / Bacteria: x    CAPILLARY BLOOD GLUCOSE          Review of Systems:   · Additional ROS: 	Patient seen in bed at bedside in good spirits. Left upper ext 1/5 in sling. He reports that he slept better last night. BM yesterday. voiding without incident. Pt able to answer questions appropriately. Pt reports rib pain relieved by current pain med reg. Incentive education provided and importance reinforced. Pt denies headache Chest pain, SOB, abdomin pain NVD, dysuria     Physical Exam:   · Constitutional Comments	alert, mood stable. In good spirits today  · Respiratory	clear to auscultation bilaterally; no wheezes; no rales; no rhonchi  · Cardiovascular	regular rate and rhythm; S1 S2 present; no gallops; no rub; no murmur  · Gastrointestinal	soft; nontender  · Motor	left UE flaccid 0/5 shoulder, elbow flexion, grasp  no pain with ROM  no hand swelling  calves soft no TTP

## 2025-01-06 NOTE — PROGRESS NOTE ADULT - NS ATTEND AMEND GEN_ALL_CORE FT
I have personally seen and examined the patient.  I fully participated in the care of this patient.  I have made amendments to the documentation where necessary, and agree with the history, physical exam, and plan as documented above I have personally seen and examined the patient.  I fully participated in the care of this patient.  I have made amendments to the documentation where necessary, and agree with the history, physical exam, and plan as documented above  patient seen at bedside, denies pain. ongoing left sided weakness, arm in sling  labs reviewed- ALT normalized--35  team meeting with therapy team, nursing, social work today--plan for 1/10 [facility]

## 2025-01-06 NOTE — PROGRESS NOTE ADULT - ASSESSMENT
Rigo Da Silva is a 69-year-old male with a past medical history of HTN presenting with a one day history of left sided weakness and a ground level fall , found to have pontine CVA c/b rib fracture.     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

## 2025-01-06 NOTE — PROGRESS NOTE ADULT - ASSESSMENT
Pt alert, fair attention, relatively intact expressive language, thought processes - mildly circumstantial, thought contents - no morbid ideation noticed, depressed affect, euthymic mood, denied AH/VH, denied SI/HI/I/P, calm behavior. Plan: Continue the assessment process. Provide individual supportive tx.

## 2025-01-06 NOTE — PROGRESS NOTE ADULT - ASSESSMENT
Rigo Da Silva is a 69-year-old male with a past medical history of HTN presenting with a one day history of left sided weakness and a ground level fall , found to have pontine CVA c/b rib fracture.     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 FES to left shoulder, bicep in therapy  - begin trial prozac 10 mg for motor recovery, discussed with patient who is agreeable 1/4. Tolerating reviewed again 1/5  - add psychology support for adjustment symptoms   - left foot drop: AFO evaluation at [facility] to allow for continued motor recovery  - Precautions: cardiac, fall, aspiration    # rib fractures  - Incentive spirometry  - pain mgt as below    # HTN  - amlodipine 5 mg daily  - BP   (132/80 - 137/74)1/5    #asymptomatic drop in hgb  - hgb 14 --> 12.7 1/3-->  13.4 repeat 1/3  - CBC 1/6    # borderline leukocytosis  - fatigue, but otherwise asymptomatic, no fever  - WBC 10.3 1/3. CBC 1/6    # mild transaminitis  - slight elevation in AST 51 1/3  - will monitor. LFTs 1/6    # Sleep:  - PRN 3mg melatonin    # Pain Management:  - Tylenol PRN  - lidocaine patch prn  - ibuprofen prn    # GI/Bowel:  - Senna QHS  - Miralax daily PRN  - patnoprazole added due to DAPT 1/3    # /Bladder:   - Voiding well    # Diet:   - regular with thins     # DVT ppx:  - Lovenox 40 mg SQ     # Case discussed in IDT rounds 12/30 (initial)  - continent B/B, skin intact 2nd floor 2 family home +stairs, regular solid thin liquids, mild cognitive impairment in memory and executive function, mod assist bed mobility, max toileting hygiene, max foot wear, max toilet transfers, mod assist stand pivot, ambulates 10 feet with HR and WC follow  - barriers: lives alone, rib pain, steps, left HP, no support on dc, cognitive impairment  - goals: supervision transfers and ambulation household level with cane, 8-12 steps CS, Cs/supervision bADJai "to become independent and return back to work" "Remberto" Recall therapy recommendations independently, supervision transfers CS  - target: 1/10 to [facility]    # LABS  CBC CMP 1/6        ---------------  Outpatient Follow-up:    Marquise Fraser)  Neurology  [address] [facility] [address], [address]  [place] [address]  Phone: [phone]  Fax: [phone]  Follow Up Time:     Your Primary Care Provider,     Demetri Lee  Cardiac Electrophysiology  [address] [place] [address], [address]  [place] [address]  Phone: [phone]  Fax: [phone]  Follow Up Time:  --------------     Rigo Da Silva is a 69-year-old male with a past medical history of HTN presenting with a one day history of left sided weakness and a ground level fall , found to have pontine CVA c/b rib fracture.     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 FES to left shoulder, bicep in therapy  - begin trial prozac 10 mg for motor recovery, discussed with patient who is agreeable 1/4. Tolerating reviewed again 1/5  - add psychology support for adjustment symptoms   - left foot drop: AFO evaluation at [facility] to allow for continued motor recovery  - Precautions: cardiac, fall, aspiration    # rib fractures  - Incentive spirometry  - pain mgt as below    # HTN  - amlodipine 5 mg daily  - BP   (132/80 - 137/74)1/5    #asymptomatic drop in hgb  - hgb 14 --> 12.7 1/3-->  13.4 repeat 1/3  - CBC 1/6    # borderline leukocytosis  - fatigue, but otherwise asymptomatic, no fever  - WBC 10.3 1/3. CBC 1/6    # mild transaminitis  - slight elevation in AST 51 1/3  - will monitor. LFTs 1/6    # Sleep:  - PRN 3mg melatonin    # Pain Management:  - Tylenol PRN  - lidocaine patch prn  - ibuprofen prn    # GI/Bowel:  - Senna QHS  - Miralax daily PRN  - patnoprazole added due to DAPT 1/3    # /Bladder:   - Voiding well    # Diet:   - regular with thins     # DVT ppx:  - Lovenox 40 mg SQ     # Case discussed in IDT rounds 1/6/25  - continent B/B, skin intact  SW 2nd floor 2 family home +stairs,  SLP -Regular solid thin liquids,  improving in recall for strategies but not for recall for use of medication; has reduced reasoning to planning; needs support with financial management; targeting memory and executive functioning  OT -  mod toilet transfer; min dreassing; min/mod toileting hygiene; cg for transfer and toileting goal  PT - min to mod A for bed mob and transfers and walked 50' with quad cane modA 1 and wc follow; no stairs yet  - barriers: lives alone, rib pain, steps, left HP, no support on dc, cognitive impairment    - goals: supervision transfers and ambulation household level with cane, 8-12 steps CS,   Cs/supervision bADLs "to become independent and return back to work" "Remberto" Recall therapy recommendations independently, supervision transfers CS ( Progressing)    - target: 1/10 to [facility]            ---------------  Outpatient Follow-up:    Marquise Fraser)  Neurology  [address] [facility], [address]  [place] [address]  Phone: [phone]  Fax: [phone]  Follow Up Time:     Your Primary Care Provider,     Demetri Lee  Cardiac Electrophysiology  [address] [place] [address], [address]  [place] [address]  Phone: [phone]  Fax: [phone]  Follow Up Time:  --------------     Rigo Da Silva is a 69-year-old male with a past medical history of HTN presenting with a one day history of left sided weakness and a ground level fall , found to have pontine CVA c/b rib fracture.     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 FES to left shoulder, bicep in therapy  - begin trial prozac 10 mg for motor recovery, discussed with patient who is agreeable 1/4. Tolerating reviewed again 1/5  - add psychology support for adjustment symptoms   - left foot drop: AFO evaluation at [facility] to allow for continued motor recovery  - Precautions: cardiac, fall, aspiration    # rib fractures  - Incentive spirometry  - pain mgt as below    # HTN  - amlodipine 5 mg daily  - BP   (121/63 - 152/69) 1/6    #asymptomatic drop in hgb  - hgb 14 --> 12.7 1/3-->  13.4 repeat 1/3 --> 12.6 1/6  - CBC 1/6, Hgb 12.6  - CBC 1/9    # borderline leukocytosis  - fatigue, but otherwise asymptomatic, no fever  - WBC 10.3 1/3. CBC 1/6    # mild transaminitis resolving  - slight elevation in AST 51 1/3, resolved ALT 35 on 1/6   - will monitor. LFTs 1/9    # Sleep:  - PRN 3mg melatonin    # Pain Management:  - Tylenol PRN  - lidocaine patch prn  - ibuprofen prn    # GI/Bowel:  - Senna QHS  - Miralax daily PRN  - patnoprazole added due to DAPT 1/3    # /Bladder:   - Voiding well    # Diet:   - regular with thins     # DVT ppx:  - Lovenox 40 mg SQ     # Case discussed in IDT rounds 1/6/25  - continent B/B, skin intact  SW 2nd floor 2 family home +stairs,  SLP -Regular solid thin liquids, improving in recall for strategies but not for recall for use of medication; has reduced reasoning to planning; needs support with financial management; targeting memory and executive functioning  OT -  mod toilet transfer; min dressing; min/mod toileting hygiene; cg for transfer and toileting goal  PT - min to mod A for bed mob and transfers and walked 50' with quad cane modA 1 and wc follow; no stairs yet  - barriers: lives alone, rib pain, steps, left HP, no support on dc, cognitive impairment  - goals: supervision transfers and ambulation household level with cane, 8-12 steps CS,   Cs/supervision bADLs "to become independent and return back to work" "Remberto" Recall therapy recommendations independently, supervision transfers CS (Progressing)  - target: 1/10 to [facility]            ---------------  Outpatient Follow-up:    Marquise Fraser)  Neurology  [address] [facility], [address]  [place] [address]  Phone: [phone]  Fax: [phone]  Follow Up Time:     Your Primary Care Provider,     Demetri Lee  Cardiac Electrophysiology  [address] [place] [address], [address]  [place] [address]  Phone: [phone]  Fax: [phone]  Follow Up Time:  --------------     Rigo Da Silva is a 69-year-old male with a past medical history of HTN presenting with a one day history of left sided weakness and a ground level fall , found to have pontine CVA c/b rib fracture.     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 FES to left shoulder, bicep in therapy  - begin trial prozac 10 mg for motor recovery, discussed with patient who is agreeable 1/4. Tolerating reviewed again 1/5  - add psychology support for adjustment symptoms   - left foot drop: AFO evaluation at [facility] to allow for continued motor recovery  - Precautions: cardiac, fall, aspiration    # rib fractures  - Incentive spirometry  - pain mgt as below    # HTN  - amlodipine 5 mg daily  - BP   (121/63 - 152/69)--1/6    #asymptomatic drop in hgb  - hgb 14 --> 12.7 1/3-->  13.4 repeat 1/3 --> 12.6 1/6    # borderline leukocytosis  - fatigue, but otherwise asymptomatic, no fever  - WBC 10.3 1/3. CBC 1/6    # mild transaminitis resolving  - slight elevation in ALT 51 1/3, resolved ---ALT 35 on 1/6   - will monitor. LFTs 1/9    # Sleep:  - PRN 3mg melatonin    # Pain Management:  - Tylenol PRN  - lidocaine patch prn  - ibuprofen prn    # GI/Bowel:  - Senna QHS  - Miralax daily PRN  - patnoprazole added due to DAPT 1/3    # /Bladder:   - Voiding well    # Diet:   - regular with thins     # DVT ppx:  - Lovenox 40 mg SQ     # Case discussed in IDT rounds 1/6/25  - continent B/B, skin intact  SW 2nd floor 2 family home +stairs,  SLP -Regular solid thin liquids, improving in recall for strategies but not for recall for use of medication; has reduced reasoning to planning; needs support with financial management; targeting memory and executive functioning  OT -  mod toilet transfer; min dressing; min/mod toileting hygiene; cg for transfer and toileting goal  PT - min to mod A for bed mob and transfers and walked 50' with quad cane modA 1 and wc follow; no stairs yet  - barriers: lives alone, rib pain, steps, left HP, no support on dc, cognitive impairment  - goals: supervision transfers and ambulation household level with cane, 8-12 steps CS,   Cs/supervision bADLs "to become independent and return back to work" "Remberto" Recall therapy recommendations independently, supervision transfers CS (Progressing)  - target: 1/10 to [facility]        ---------------  Outpatient Follow-up:    Marquise Fraser)  Neurology  [address] [facility], [address]  [place] [address]  Phone: [phone]  Fax: [phone]  Follow Up Time:     Your Primary Care Provider,     Demetri Lee  Cardiac Electrophysiology  [address] [place] [address], [address]  [place] [address]  Phone: [phone]  Fax: [phone]  Follow Up Time:  --------------

## 2025-01-07 LAB — OB PNL STL: NEGATIVE — SIGNIFICANT CHANGE UP

## 2025-01-07 PROCEDURE — 99232 SBSQ HOSP IP/OBS MODERATE 35: CPT

## 2025-01-07 RX ADMIN — CLOPIDOGREL BISULFATE 75 MILLIGRAM(S): 75 TABLET, FILM COATED ORAL at 11:49

## 2025-01-07 RX ADMIN — PANTOPRAZOLE 40 MILLIGRAM(S): 40 TABLET, DELAYED RELEASE ORAL at 06:25

## 2025-01-07 RX ADMIN — Medication 17 GRAM(S): at 11:49

## 2025-01-07 RX ADMIN — Medication 81 MILLIGRAM(S): at 11:49

## 2025-01-07 RX ADMIN — Medication 10 MILLIGRAM(S): at 06:26

## 2025-01-07 RX ADMIN — ENOXAPARIN SODIUM 40 MILLIGRAM(S): 60 INJECTION INTRAVENOUS; SUBCUTANEOUS at 06:24

## 2025-01-07 RX ADMIN — ATORVASTATIN CALCIUM 80 MILLIGRAM(S): 40 TABLET, FILM COATED ORAL at 21:30

## 2025-01-07 RX ADMIN — Medication 10 MILLIGRAM(S): at 11:49

## 2025-01-07 NOTE — PROGRESS NOTE ADULT - ASSESSMENT
Rigo Da Silva is a 69-year-old male with a past medical history of HTN presenting with a one day history of left sided weakness and a ground level fall , found to have pontine CVA c/b rib fracture.     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

## 2025-01-07 NOTE — PROGRESS NOTE ADULT - SUBJECTIVE AND OBJECTIVE BOX
Patient is a 69y old  Male who presents with a chief complaint of Right pontine stroke.     seen at the bedside, no overnight events, no n/v, no sob, no headache or dizziness    Vital Signs Last 24 Hrs  T(C): 36.8 (07 Jan 2025 09:03), Max: 37.3 (06 Jan 2025 19:35)  T(F): 98.3 (07 Jan 2025 09:03), Max: 99.1 (06 Jan 2025 19:35)  HR: 77 (07 Jan 2025 09:03) (72 - 77)  BP: 147/62 (07 Jan 2025 09:03) (123/54 - 160/74)  BP(mean): --  RR: 16 (07 Jan 2025 09:03) (12 - 16)  SpO2: 97% (07 Jan 2025 09:03) (94% - 97%)    Parameters below as of 07 Jan 2025 09:03  Patient On (Oxygen Delivery Method): room air        GENERAL- NAD  EAR/NOSE/MOUTH/THROAT -  MMM  EYES- REEMA, conjunctiva and Sclera clear  NECK- supple  RESPIRATORY-  clear to auscultation bilaterally  CARDIOVASCULAR - SIS2, RRR  GI - soft NT BS present  EXTREMITIES- no pedal edema  NEUROLOGY- LSW                        12.6                 139  | 29   | 16           8.69  >-----------< 342     ------------------------< 105                   37.3                 4.4  | 103  | 1.05                                         Ca 9.3   Mg x     Ph x          MEDICATIONS  (STANDING):  amLODIPine   Tablet 10 milliGRAM(s) Oral daily  aspirin  chewable 81 milliGRAM(s) Oral daily  atorvastatin 80 milliGRAM(s) Oral at bedtime  clopidogrel Tablet 75 milliGRAM(s) Oral daily  enoxaparin Injectable 40 milliGRAM(s) SubCutaneous every 24 hours  FLUoxetine 10 milliGRAM(s) Oral daily  lidocaine   4% Patch 1 Patch Transdermal daily  pantoprazole    Tablet 40 milliGRAM(s) Oral before breakfast  polyethylene glycol 3350 17 Gram(s) Oral daily    MEDICATIONS  (PRN):  acetaminophen     Tablet .. 650 milliGRAM(s) Oral every 6 hours PRN Mild Pain (1 - 3)  melatonin 3 milliGRAM(s) Oral at bedtime PRN Insomnia  Patient is a 69y old  Male who presents with a chief complaint of Right pontine stroke.

## 2025-01-07 NOTE — PROGRESS NOTE ADULT - SUBJECTIVE AND OBJECTIVE BOX
Patient is a 69y old  Male who presents with a chief complaint of Right pontine stroke. (06 Jan 2025 13:48)      HPI:  Rigo Da Silva is a 69-year-old male with a past medical history of HTN presenting with a one day history of left sided weakness and a ground level fall concerning for an acute ischemic stroke and found to have a rib fracture. Code stroke was called and neurology consulted. NIHSS 4, LKW 12/23 2200. CT head with hypoattenuation in the right pontine location concerning for an acute ischemic stroke. Presented >24 hours from symptom onset. Likely 2/2 to intracranial atherosclerosis per neurology. PT/OT/PM&R evaluated patient and rec  acute inpatient rehab. Patient was optimized for discharge on 12/27 to [facility].    (27 Dec 2024 16:35)      PAST MEDICAL & SURGICAL HISTORY:  HTN (hypertension)      Unilateral inguinal hernia, without obstruction or gangrene, not specified as recurrent      H/O hernia repair  right      History of surgery on arm  fx with metal          MEDICATIONS  (STANDING):  amLODIPine   Tablet 10 milliGRAM(s) Oral daily  aspirin  chewable 81 milliGRAM(s) Oral daily  atorvastatin 80 milliGRAM(s) Oral at bedtime  clopidogrel Tablet 75 milliGRAM(s) Oral daily  enoxaparin Injectable 40 milliGRAM(s) SubCutaneous every 24 hours  FLUoxetine 10 milliGRAM(s) Oral daily  lidocaine   4% Patch 1 Patch Transdermal daily  pantoprazole    Tablet 40 milliGRAM(s) Oral before breakfast  polyethylene glycol 3350 17 Gram(s) Oral daily    MEDICATIONS  (PRN):  acetaminophen     Tablet .. 650 milliGRAM(s) Oral every 6 hours PRN Mild Pain (1 - 3)  melatonin 3 milliGRAM(s) Oral at bedtime PRN Insomnia      Allergies    No Known Allergies    Intolerances          VITALS  69y  Vital Signs Last 24 Hrs  T(C): 36.8 (07 Jan 2025 06:21), Max: 37.3 (06 Jan 2025 19:35)  T(F): 98.2 (07 Jan 2025 06:21), Max: 99.1 (06 Jan 2025 19:35)  HR: 72 (07 Jan 2025 06:21) (72 - 72)  BP: 160/74 (07 Jan 2025 06:21) (123/54 - 160/74)  BP(mean): --  RR: 12 (06 Jan 2025 19:35) (12 - 12)  SpO2: 94% (07 Jan 2025 06:21) (94% - 96%)    Parameters below as of 06 Jan 2025 19:35  Patient On (Oxygen Delivery Method): room air      Daily     Daily         RECENT LABS:                          12.6   8.69  )-----------( 342      ( 06 Jan 2025 06:05 )             37.3     01-06    139  |  103  |  16  ----------------------------<  105[H]  4.4   |  29  |  1.05    Ca    9.3      06 Jan 2025 06:05    TPro  7.8  /  Alb  2.9[L]  /  TBili  0.6  /  DBili  x   /  AST  21  /  ALT  35  /  AlkPhos  65  01-06    LIVER FUNCTIONS - ( 06 Jan 2025 06:05 )  Alb: 2.9 g/dL / Pro: 7.8 g/dL / ALK PHOS: 65 U/L / ALT: 35 U/L / AST: 21 U/L / GGT: x             Urinalysis Basic - ( 06 Jan 2025 06:05 )    Color: x / Appearance: x / SG: x / pH: x  Gluc: 105 mg/dL / Ketone: x  / Bili: x / Urobili: x   Blood: x / Protein: x / Nitrite: x   Leuk Esterase: x / RBC: x / WBC x   Sq Epi: x / Non Sq Epi: x / Bacteria: x          CAPILLARY BLOOD GLUCOSE

## 2025-01-07 NOTE — PROGRESS NOTE ADULT - ASSESSMENT
Rigo Da Silva is a 69-year-old male with a past medical history of HTN presenting with a one day history of left sided weakness and a ground level fall , found to have pontine CVA c/b rib fracture.     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 Patient with increased left UE ROM noted today, reviewed with patient 1/7  - psychology support for adjustment symptoms   - left foot drop: AFO evaluation at [facility] to allow for continued motor recovery  - Precautions: cardiac, fall, aspiration    # rib fractures  - Incentive spirometry  - pain mgt as below    # HTN  - amlodipine 5 mg daily  - BP   (123/54 - 160/74) 1/7    # asymptomatic drop in hgb  - pantoprazole added  - hgb 14 --> 12.7 1/3-->  13.4 1/3 --> 12.6 1/6  - CBC 1/9  - PCP f/u on dc    # borderline leukocytosis  - fatigue, but otherwise asymptomatic, no fever  - WBC 8.69 1/6 resolved    # mild transaminitis  - slight elevation in ALT 51 1/3, --->ALT 35 1/6  resolved  - PCP f./u on dc    # Sleep:  - PRN 3mg melatonin    # Pain Management:  - Tylenol PRN  - lidocaine patch prn  - ibuprofen prn    # GI/Bowel:  - Senna QHS  - Miralax daily PRN  - patnoprazole added due to DAPT 1/3    # /Bladder:   - Voiding well    # Diet:   - regular with thins     # DVT ppx:  - Lovenox 40 mg SQ     # Case discussed in IDT rounds 1/6/25  - continent B/B, skin intact, Regular solid thin liquids, improving in recall for strategies but not for recall for use of medication; has reduced reasoning to planning; needs support with financial management; targeting memory and executive functioning, mod toilet transfer; min dressing; min/mod toileting hygiene; min to mod A for bed mob and transfers and walked 50' with quad cane modA 1 and wc follow; no stairs yet  - barriers: lives alone, rib pain, steps, left HP, no support on dc, cognitive impairment  - goals: supervision transfers and ambulation household level with cane, 8-12 steps CS, cg for transfer and toileting goal   Cs/supervision bADLs "to become independent and return back to work" "Remberto" Recall therapy recommendations independently, supervision transfers CS (Progressing)  - target: 1/10 to [facility]      # LABS  CBC CMP 1/9  ---------------  Outpatient Follow-up:    Marquise Fraser)  Neurology  [address] [facility], [address]  [place] [address]  Phone: [phone]  Fax: [phone]  Follow Up Time:     Your Primary Care Provider,     Demetri Lee  Cardiac Electrophysiology  [address] [place] [address], [address]  [place] [address]  Phone: [phone]  Fax: [phone]  Follow Up Time:  --------------     Rigo Da Silva is a 69-year-old male with a past medical history of HTN presenting with a one day history of left sided weakness and a ground level fall , found to have pontine CVA c/b rib fracture.     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 Patient with increased left UE ROM noted today, reviewed with patient 1/7  - psychology support for adjustment symptoms   - left foot drop: AFO evaluation at [facility] to allow for continued motor recovery  - Precautions: cardiac, fall, aspiration    # rib fractures  - Incentive spirometry  - pain mgt as below    # HTN  - amlodipine 5 mg daily  - BP   (123/54 - 160/74) 1/7    # asymptomatic drop in hgb  - pantoprazole added  - hgb 14 --> 12.7 1/3-->  13.4 1/3 --> 12.6 1/6  - discussed with hospitalist, will send stool guaiac 1/7 for fluctuating/downtrending Hgb  - CBC 1/9  - PCP f/u on dc    # borderline leukocytosis  - fatigue, but otherwise asymptomatic, no fever  - WBC 8.69 1/6 resolved    # mild transaminitis  - slight elevation in ALT 51 1/3, --->ALT 35 1/6  resolved  - PCP f./u on dc    # Sleep:  - PRN 3mg melatonin    # Pain Management:  - Tylenol PRN  - lidocaine patch prn  - ibuprofen prn    # GI/Bowel:  - Senna QHS  - Miralax daily PRN  - patnoprazole added due to DAPT 1/3    # /Bladder:   - Voiding well    # Diet:   - regular with thins     # DVT ppx:  - Lovenox 40 mg SQ     # Case discussed in IDT rounds 1/6/25  - continent B/B, skin intact, Regular solid thin liquids, improving in recall for strategies but not for recall for use of medication; has reduced reasoning to planning; needs support with financial management; targeting memory and executive functioning, mod toilet transfer; min dressing; min/mod toileting hygiene; min to mod A for bed mob and transfers and walked 50' with quad cane modA 1 and wc follow; no stairs yet  - barriers: lives alone, rib pain, steps, left HP, no support on dc, cognitive impairment  - goals: supervision transfers and ambulation household level with cane, 8-12 steps CS, cg for transfer and toileting goal   Cs/supervision bADLs "to become independent and return back to work" "Remberto" Recall therapy recommendations independently, supervision transfers CS (Progressing)  - target: 1/10 to [facility]      # LABS  stool guaiac  CBC CMP 1/9  ---------------  Outpatient Follow-up:    Marquise Fraser)  Neurology  [address] [facility], [address]  [place] [address]  Phone: [phone]  Fax: [phone]  Follow Up Time:     Your Primary Care Provider,     Demetri Lee  Cardiac Electrophysiology  [address] [place] [address], [address]  [place] [address]  Phone: [phone]  Fax: [phone]  Follow Up Time:  --------------

## 2025-01-08 PROCEDURE — 99232 SBSQ HOSP IP/OBS MODERATE 35: CPT

## 2025-01-08 RX ORDER — PANTOPRAZOLE 40 MG/1
1 TABLET, DELAYED RELEASE ORAL
Qty: 0 | Refills: 0 | DISCHARGE
Start: 2025-01-08

## 2025-01-08 RX ADMIN — ENOXAPARIN SODIUM 40 MILLIGRAM(S): 60 INJECTION INTRAVENOUS; SUBCUTANEOUS at 05:42

## 2025-01-08 RX ADMIN — LIDOCAINE 1 PATCH: 50 OINTMENT TOPICAL at 23:26

## 2025-01-08 RX ADMIN — Medication 10 MILLIGRAM(S): at 05:42

## 2025-01-08 RX ADMIN — Medication 10 MILLIGRAM(S): at 11:43

## 2025-01-08 RX ADMIN — Medication 17 GRAM(S): at 11:43

## 2025-01-08 RX ADMIN — ATORVASTATIN CALCIUM 80 MILLIGRAM(S): 40 TABLET, FILM COATED ORAL at 21:35

## 2025-01-08 RX ADMIN — Medication 81 MILLIGRAM(S): at 11:43

## 2025-01-08 RX ADMIN — LIDOCAINE 1 PATCH: 50 OINTMENT TOPICAL at 11:44

## 2025-01-08 RX ADMIN — PANTOPRAZOLE 40 MILLIGRAM(S): 40 TABLET, DELAYED RELEASE ORAL at 05:42

## 2025-01-08 RX ADMIN — CLOPIDOGREL BISULFATE 75 MILLIGRAM(S): 75 TABLET, FILM COATED ORAL at 11:43

## 2025-01-08 RX ADMIN — LIDOCAINE 1 PATCH: 50 OINTMENT TOPICAL at 19:03

## 2025-01-08 NOTE — PROGRESS NOTE ADULT - ASSESSMENT
Rigo Da Silva is a 69-year-old male with a past medical history of HTN presenting with a one day history of left sided weakness and a ground level fall , found to have pontine CVA c/b rib fracture.     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 Patient with increased left UE ROM noted  - psychology support for adjustment symptoms   - left foot drop: AFO evaluation at [facility] to allow for continued motor recovery  - Precautions: cardiac, fall, aspiration    # rib fractures  - Incentive spirometry  - pain mgt as below    # HTN  - amlodipine 5 mg daily  - BP  (137/73 - 147/62) 1/8    # asymptomatic drop in hgb  - pantoprazole added  - hgb 14 --> 12.7 1/3-->  13.4 1/3 --> 12.6 1/6  - Stool guaiac NEGATIVE 1/7  - CBC 1/9  - PCP f/u on dc    # borderline leukocytosis  - fatigue, but otherwise asymptomatic, no fever  - WBC 8.69 1/6 resolved  - CBC 1/9    # mild transaminitis  - slight elevation in ALT 51 1/3, --->ALT 35 1/6  resolved  - LFTs 1/9  - PCP f./u on dc    # Sleep:  - PRN 3mg melatonin    # Pain Management:  - Tylenol PRN  - lidocaine patch prn  - ibuprofen prn    # GI/Bowel:  - Senna QHS  - Miralax daily PRN  - patnoprazole added due to DAPT 1/3    # /Bladder:   - Voiding well    # Diet:   - regular with thins     # DVT ppx:  - Lovenox 40 mg SQ     # Case discussed in IDT rounds 1/8 (follow up)  -     - barriers: lives alone, rib pain, steps, left HP, no support on dc, cognitive impairment  - goals: supervision transfers and ambulation household level with cane, 8-12 steps CS, cg for transfer and toileting goal   Cs/supervision bADLs "to become independent and return back to work" "Remberto" Recall therapy recommendations independently, supervision transfers CS (Progressing)  - target: 1/10 to [facility]      # LABS  CBC CMP 1/9  ---------------  Outpatient Follow-up:    Marquise Fraser)  Neurology  [address] [facility], [address]  [place] [address]  Phone: [phone]  Fax: [phone]  Follow Up Time:     Your Primary Care Provider,     Demetri Lee  Cardiac Electrophysiology  [address] [place] [address], [address]  [place] [address]  Phone: [phone]  Fax: [phone]  Follow Up Time:  --------------     Rigo Da Silva is a 69-year-old male with a past medical history of HTN presenting with a one day history of left sided weakness and a ground level fall , found to have pontine CVA c/b rib fracture.     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 Patient with increased left UE ROM noted  - psychology support for adjustment symptoms   - left foot drop: AFO evaluation at [facility] to allow for continued motor recovery  - Precautions: cardiac, fall, aspiration    # rib fractures  - Incentive spirometry  - pain mgt as below    # HTN  - amlodipine 5 mg daily  - BP  (137/73 - 147/62) 1/8    # asymptomatic drop in hgb  - pantoprazole added  - hgb 14 --> 12.7 1/3-->  13.4 1/3 --> 12.6 1/6  - Stool guaiac NEGATIVE 1/7  - CBC 1/9  - PCP f/u on dc    # borderline leukocytosis  - fatigue, but otherwise asymptomatic, no fever  - WBC 8.69 1/6 resolved  - CBC 1/9    # mild transaminitis  - slight elevation in ALT 51 1/3, --->ALT 35 1/6  resolved  - LFTs 1/9  - PCP f./u on dc    # Sleep:  - PRN 3mg melatonin    # Pain Management:  - Tylenol PRN  - lidocaine patch prn  - ibuprofen prn    # GI/Bowel:  - Senna QHS  - Miralax daily PRN  - patnoprazole added due to DAPT 1/3    # /Bladder:   - Voiding well    # Diet:   - regular with thins     # DVT ppx:  - Lovenox 40 mg SQ     # Case discussed in IDT rounds 1/8 (follow up)  - continent, not impulsive, O x 4. Has been accepted to [facility] rehab, has bed assigned, regular solid thin liquids, language WFL with mild cog deficits, decreased divided attention, improving memory using strategies, reduced iADLs, mod toilet transfers, min-mod toileting, min LB dressing, set up eating, min assist bed mobility and transfers, ambulates 100 feet with WBQC and min assist, calm  - barriers: lives alone, rib pain, steps, left HP, no support on dc, cognitive impairment  - goals: supervision transfers and ambulation household level with cane, 8-12 steps CS, cg for transfer and toileting goal   Cs/supervision bADLs "to become independent and return back to work" "Remberto" Recall therapy recommendations independently (progressing), supervision transfers CS (Progressing)  - target: 1/10 to [facility] On track      # LABS  CBC CMP 1/9  ---------------  Outpatient Follow-up:    Marquise Fraser)  Neurology  [address] [facility]  [place] [address]  Phone: [phone]  Fax: [phone]  Follow Up Time:     Your Primary Care Provider,     Demetri Lee  Cardiac Electrophysiology  [address] [place] [address], [address]  [place] [address]  Phone: [phone]  Fax: [phone]  Follow Up Time:  --------------     Rigo Da Silva is a 69-year-old male with a past medical history of HTN presenting with a one day history of left sided weakness and a ground level fall , found to have pontine CVA c/b rib fracture.     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 Patient with increased left UE ROM noted, also noted improvement mood  - psychology support for adjustment symptoms   - left foot drop: AFO evaluation at [facility] to allow for continued motor recovery  - Precautions: cardiac, fall, aspiration    # rib fractures  - Incentive spirometry  - pain mgt as below    # HTN  - amlodipine 5 mg daily  - BP  (137/73 - 147/62) 1/8. Reviewed with patient, goal range discussed    # asymptomatic drop in hgb  - pantoprazole added  - hgb 14 --> 12.7 1/3-->  13.4 1/3 --> 12.6 1/6  - Stool guaiac NEGATIVE 1/7  - CBC 1/9  - PCP f/u on dc    # borderline leukocytosis  - fatigue, but otherwise asymptomatic, no fever  - WBC 8.69 1/6 resolved  - CBC 1/9    # mild transaminitis  - slight elevation in ALT 51 1/3, --->ALT 35 1/6  resolved  - LFTs 1/9  - PCP f./u on dc    # Sleep:  - PRN 3mg melatonin    # Pain Management:  - Tylenol PRN  - lidocaine patch prn  - ibuprofen prn    # GI/Bowel:  - Senna QHS  - Miralax daily PRN  - patnoprazole added due to DAPT 1/3    # /Bladder:   - Voiding well    # Diet:   - regular with thins     # DVT ppx:  - Lovenox 40 mg SQ     # Case discussed in IDT rounds 1/8 (follow up)  - continent, not impulsive, O x 4. Has been accepted to [facility] rehab, has bed assigned, regular solid thin liquids, language WFL with mild cog deficits, decreased divided attention, improving memory using strategies, reduced iADLs, mod toilet transfers, min-mod toileting, min LB dressing, set up eating, min assist bed mobility and transfers, ambulates 100 feet with WBQC and min assist, calm  - barriers: lives alone, rib pain, steps, left HP, no support on dc, cognitive impairment  - goals: supervision transfers and ambulation household level with cane, 8-12 steps CS, cg for transfer and toileting goal   Cs/supervision bADLs "to become independent and return back to work" "Remberto" Recall therapy recommendations independently (progressing), supervision transfers CS (Progressing)  - target: 1/10 to [facility]. On track      # LABS  CBC CMP 1/9  ---------------  Outpatient Follow-up:    Marquise Fraser)  Neurology  [address] [facility], [address]  [place] [address]  Phone: [phone]  Fax: [phone]  Follow Up Time:     Your Primary Care Provider,     Demetri Lee  Cardiac Electrophysiology  [address] [place] [address], [address]  [place] [address]  Phone: [phone]  Fax: [phone]  Follow Up Time:  --------------

## 2025-01-08 NOTE — PROGRESS NOTE ADULT - SUBJECTIVE AND OBJECTIVE BOX
Patient is a 69y old  Male who presents with a chief complaint of Right pontine stroke. (07 Jan 2025 13:17)      HPI:  Rigo Da Silva is a 69-year-old male with a past medical history of HTN presenting with a one day history of left sided weakness and a ground level fall concerning for an acute ischemic stroke and found to have a rib fracture. Code stroke was called and neurology consulted. NIHSS 4, LKW 12/23 2200. CT head with hypoattenuation in the right pontine location concerning for an acute ischemic stroke. Presented >24 hours from symptom onset. Likely 2/2 to intracranial atherosclerosis per neurology. PT/OT/PM&R evaluated patient and rec  acute inpatient rehab. Patient was optimized for discharge on 12/27 to [facility].    (27 Dec 2024 16:35)      PAST MEDICAL & SURGICAL HISTORY:  HTN (hypertension)      Unilateral inguinal hernia, without obstruction or gangrene, not specified as recurrent      H/O hernia repair  right      History of surgery on arm  fx with metal          MEDICATIONS  (STANDING):  amLODIPine   Tablet 10 milliGRAM(s) Oral daily  aspirin  chewable 81 milliGRAM(s) Oral daily  atorvastatin 80 milliGRAM(s) Oral at bedtime  clopidogrel Tablet 75 milliGRAM(s) Oral daily  enoxaparin Injectable 40 milliGRAM(s) SubCutaneous every 24 hours  FLUoxetine 10 milliGRAM(s) Oral daily  lidocaine   4% Patch 1 Patch Transdermal daily  pantoprazole    Tablet 40 milliGRAM(s) Oral before breakfast  polyethylene glycol 3350 17 Gram(s) Oral daily    MEDICATIONS  (PRN):  acetaminophen     Tablet .. 650 milliGRAM(s) Oral every 6 hours PRN Mild Pain (1 - 3)  melatonin 3 milliGRAM(s) Oral at bedtime PRN Insomnia      Allergies    No Known Allergies    Intolerances          VITALS  69y  Vital Signs Last 24 Hrs  T(C): 36.8 (07 Jan 2025 19:56), Max: 36.8 (07 Jan 2025 09:03)  T(F): 98.2 (07 Jan 2025 19:56), Max: 98.3 (07 Jan 2025 09:03)  HR: 62 (08 Jan 2025 05:46) (62 - 77)  BP: 137/73 (08 Jan 2025 05:46) (137/73 - 147/62)  BP(mean): --  RR: 15 (07 Jan 2025 19:56) (15 - 16)  SpO2: 95% (07 Jan 2025 19:56) (95% - 97%)    Parameters below as of 07 Jan 2025 19:56  Patient On (Oxygen Delivery Method): room air      Daily     Daily         RECENT LABS:                      CAPILLARY BLOOD GLUCOSE

## 2025-01-09 LAB
ALBUMIN SERPL ELPH-MCNC: 3.2 G/DL — LOW (ref 3.3–5)
ALP SERPL-CCNC: 78 U/L — SIGNIFICANT CHANGE UP (ref 40–120)
ALT FLD-CCNC: 39 U/L — SIGNIFICANT CHANGE UP (ref 10–45)
ANION GAP SERPL CALC-SCNC: 8 MMOL/L — SIGNIFICANT CHANGE UP (ref 5–17)
AST SERPL-CCNC: 23 U/L — SIGNIFICANT CHANGE UP (ref 10–40)
BILIRUB SERPL-MCNC: 0.7 MG/DL — SIGNIFICANT CHANGE UP (ref 0.2–1.2)
BUN SERPL-MCNC: 15 MG/DL — SIGNIFICANT CHANGE UP (ref 7–23)
CALCIUM SERPL-MCNC: 9.6 MG/DL — SIGNIFICANT CHANGE UP (ref 8.4–10.5)
CHLORIDE SERPL-SCNC: 102 MMOL/L — SIGNIFICANT CHANGE UP (ref 96–108)
CO2 SERPL-SCNC: 29 MMOL/L — SIGNIFICANT CHANGE UP (ref 22–31)
CREAT SERPL-MCNC: 1.06 MG/DL — SIGNIFICANT CHANGE UP (ref 0.5–1.3)
EGFR: 76 ML/MIN/1.73M2 — SIGNIFICANT CHANGE UP
GLUCOSE SERPL-MCNC: 100 MG/DL — HIGH (ref 70–99)
HCT VFR BLD CALC: 38.7 % — LOW (ref 39–50)
HGB BLD-MCNC: 13 G/DL — SIGNIFICANT CHANGE UP (ref 13–17)
MCHC RBC-ENTMCNC: 29 PG — SIGNIFICANT CHANGE UP (ref 27–34)
MCHC RBC-ENTMCNC: 33.6 G/DL — SIGNIFICANT CHANGE UP (ref 32–36)
MCV RBC AUTO: 86.4 FL — SIGNIFICANT CHANGE UP (ref 80–100)
NRBC # BLD: 0 /100 WBCS — SIGNIFICANT CHANGE UP (ref 0–0)
PLATELET # BLD AUTO: 374 K/UL — SIGNIFICANT CHANGE UP (ref 150–400)
POTASSIUM SERPL-MCNC: 4.1 MMOL/L — SIGNIFICANT CHANGE UP (ref 3.5–5.3)
POTASSIUM SERPL-SCNC: 4.1 MMOL/L — SIGNIFICANT CHANGE UP (ref 3.5–5.3)
PROT SERPL-MCNC: 8.5 G/DL — HIGH (ref 6–8.3)
RBC # BLD: 4.48 M/UL — SIGNIFICANT CHANGE UP (ref 4.2–5.8)
RBC # FLD: 11.4 % — SIGNIFICANT CHANGE UP (ref 10.3–14.5)
SODIUM SERPL-SCNC: 139 MMOL/L — SIGNIFICANT CHANGE UP (ref 135–145)
WBC # BLD: 8.47 K/UL — SIGNIFICANT CHANGE UP (ref 3.8–10.5)
WBC # FLD AUTO: 8.47 K/UL — SIGNIFICANT CHANGE UP (ref 3.8–10.5)

## 2025-01-09 PROCEDURE — 99232 SBSQ HOSP IP/OBS MODERATE 35: CPT

## 2025-01-09 RX ADMIN — PANTOPRAZOLE 40 MILLIGRAM(S): 40 TABLET, DELAYED RELEASE ORAL at 06:03

## 2025-01-09 RX ADMIN — ATORVASTATIN CALCIUM 80 MILLIGRAM(S): 40 TABLET, FILM COATED ORAL at 21:44

## 2025-01-09 RX ADMIN — Medication 10 MILLIGRAM(S): at 06:03

## 2025-01-09 RX ADMIN — LIDOCAINE 1 PATCH: 50 OINTMENT TOPICAL at 23:30

## 2025-01-09 RX ADMIN — CLOPIDOGREL BISULFATE 75 MILLIGRAM(S): 75 TABLET, FILM COATED ORAL at 11:19

## 2025-01-09 RX ADMIN — Medication 81 MILLIGRAM(S): at 11:21

## 2025-01-09 RX ADMIN — Medication 17 GRAM(S): at 11:20

## 2025-01-09 RX ADMIN — Medication 10 MILLIGRAM(S): at 11:21

## 2025-01-09 RX ADMIN — LIDOCAINE 1 PATCH: 50 OINTMENT TOPICAL at 11:18

## 2025-01-09 RX ADMIN — LIDOCAINE 1 PATCH: 50 OINTMENT TOPICAL at 17:27

## 2025-01-09 RX ADMIN — ENOXAPARIN SODIUM 40 MILLIGRAM(S): 60 INJECTION INTRAVENOUS; SUBCUTANEOUS at 06:02

## 2025-01-09 NOTE — PROGRESS NOTE ADULT - COMMENTS
Patient stable, he looks less fatigued than yesterday. Reports sleeping well overnight, no fever, no chills, no dysuria, diarrhea, cough, SOB.     He does state he's frustrated over lack of motor return left UE. We dsicussed prozac, and he is agreeable to trial
Patient in bed, awake. He looks less withdrawn and fatigued. Denies pain in left UE, no H/A. Also reports sleeping better, however he says "I don't have pain but I can't move [the arm]"
Patient seen in bed. He reports that he slept better last night. Started prozac, does not remember our discussion about SE and name of med, and was reviewed again. He denies GI upset, no diarrhea, no N/V. Agreeable to continue; cautioned it was not a "magic pill" that will work overnight, but may assist with mood/sleep and potential improvement recovery over  month
Patient seen with SLP. Slept well overnight. Continues to have significant improvement in mood over this past week, especially with left UE motor return, and even smiling and laughing at times during exam. No B?b complaints, aware of dc tomorrow to [facility] and agreeable with plan
Patient with left sling in place. He reports not having any movement in hand/fingers however has some trace movement in shoulder. His mood overall stable, he reports no difficulty in sleeping, no B/B complaint, He is performing simple budgeting task in SLP; initially says it's going well, then reports he's doing it "differently" but has difficulty clarifying.
Patient seen in bed. He states he is concerned because "blood pressure high" REads over past 24 hours discussed, improving. He denies any symptoms, including no H/A, no N/V, no new weakness or numbness, no CP, palpitations or SOB. WE discussed acceptable range at this time post stroke, and that we will continue to monitor    Denies any other symptoms, improved sleep. Notes improved motor return although he still feels he can't move his hand /wrist (there is movement in finger however)
Patient in bed. Alert, denies any pain, H/A, B/B complaints. He is continent; aware and requests assist for toileting when needed. He is Ox  4 and follows multiple step commands consistently    "I had a stroke"

## 2025-01-09 NOTE — PROGRESS NOTE ADULT - RESPIRATORY
normal/clear to auscultation bilaterally/no wheezes/no rales/no rhonchi
fair effort/clear to auscultation bilaterally/no respiratory distress
no respiratory distress
normal/clear to auscultation bilaterally/no wheezes/no rales/no rhonchi

## 2025-01-09 NOTE — PROGRESS NOTE ADULT - CARDIOVASCULAR
normal/regular rate and rhythm/S1 S2 present/no gallops/no rub/no murmur
normal/regular rate and rhythm/S1 S2 present/no gallops/no rub/no murmur
normal/regular rate and rhythm
normal/regular rate and rhythm/S1 S2 present/no gallops/no rub/no murmur

## 2025-01-09 NOTE — PROGRESS NOTE ADULT - MOTOR
left UE flaccid 0/5 shoulder, elbow flexion, grasp  no pain with ROM  no hand swelling  calves soft no TTP
left shoulder 2/5 elbow flexion 2/5 tricep 2/5 finger flexor II 2-/5 wrist flexion 1/5  hypotonia left UE  calves soft no tTP bilaterally
left shoulder 1/5 elbow flexion, extension , gross grasp 0/5  left HF 3/5 quad 3/5 ankle DF 0./5 PF 3-/5
eft UE flaccid 0/5 shoulder, elbow flexion, grasp  no pain with ROM  no hand swelling  calves soft no TTP
left UE flaccid 0./5 shoulder, elbow flexion, wrist flexion or grasp  no TTP GH joint  left HF 3/5 quad 3/5 ankle DF 0./5 PF 3-/5  calves soft no pedal edema
left shoulder 2/5 elbow flexion 3/5 extension 3/5 finger flexion 2/5  ROM WFL  left HF 4+/5 quad 5-/5 ankle DF 5-/5 PF 5-./5  calves soft no TTP
left shoulder: improved synergistic movement and patient also performing actively:" shoulder abductino, retraction 2/5 elbow flexion 2/5 (no antigravity yet) 2/5 extension, finger flexion 2/5 difficulty with finger extension actively or wrist flexion/ext  bilateral calves soft no TTP  no pain with AROm shoulder

## 2025-01-09 NOTE — PROGRESS NOTE ADULT - CONSTITUTIONAL COMMENTS
Mood stable, NAD Afebrile, stable overnight. Does report continued occ left flank/.rib discomfort but stable
alert, pleasant O x 3-4 NAD afebrile
alert O x 4 afebrile NAD. Mood stable , mild dysarthria and mild hyypophonia. no drooling noted, however some food on left side shirt
more communicative, less withdrawn, NAD, compliant with exam follows 1- 2 step commands
alert, cooperative O x 3 grossly. Seems mildly withdrawn
alert, improved mood and verbalization/less withdrawn NAD
alert, mood stable. Appears less fatigued and withdrawn, initiates questions today

## 2025-01-09 NOTE — PROGRESS NOTE ADULT - ASSESSMENT
Rigo Da Silva is a 69-year-old male with a past medical history of HTN presenting with a one day history of left sided weakness and a ground level fall , found to have pontine CVA c/b rib fracture.     # Right pontine stroke and left body involvement, dysarthria  - MRI: R pontine stroke, likely 2/2 to atherosclerosis  - Comprehensive Multidisciplinary Rehab Program: 3 hours a day, 5 days a week with PT/OT/SLP  - prozac 10 mg 1/5. Patient with increased left UE ROM noted, also noted improvement mood  - psychology support for adjustment symptoms   - Precautions: cardiac, fall, aspiration    # rib fractures  - Incentive spirometry  - pain mgt as below    # HTN  - amlodipine 5 mg daily  - BP  (131/65 - 145/77) 1/9 discussed with patient    # asymptomatic drop in hgb  - pantoprazole added  - Stool guaiac NEGATIVE 1/7  - hgb 14 --> 12.7 1/3-->  13.4 1/3 --> 12.6 1/6--> 13.0 1/9 stable  - PCP f/u on dc    # borderline leukocytosis  -  asymptomatic, no fever  - WBC 8.47 1/9 resolved    # mild transaminitis  -  AST  23  /  ALT  39  /  AlkPhos  78  01-09 resolved  - PCP f./u on dc    # Sleep:  - PRN 3mg melatonin    # Pain Management:  - Tylenol PRN  - lidocaine patch prn  - ibuprofen prn    # GI/Bowel:  - Senna QHS  - Miralax daily PRN  - pantoprazole added due to DAPT 1/3    # Diet:   - regular with thins     # DVT ppx:  - Lovenox 40 mg SQ     # Case discussed in IDT rounds 1/8 (follow up)  - continent, not impulsive, O x 4. Has been accepted to [facility] rehab, has bed assigned, regular solid thin liquids, language WFL with mild cog deficits, decreased divided attention, improving memory using strategies, reduced iADLs, mod toilet transfers, min-mod toileting, min LB dressing, set up eating, min assist bed mobility and transfers, ambulates 100 feet with WBQC and min assist, calm  - barriers: lives alone, rib pain, steps, left HP, no support on dc, cognitive impairment  - goals: supervision transfers and ambulation household level with cane, 8-12 steps CS, cg for transfer and toileting goal   Cs/supervision bADLs "to become independent and return back to work" "Remberto" Recall therapy recommendations independently (progressing), supervision transfers CS (Progressing)  - target: 1/10 to [facility] [place]. On track, patient aware and agreeable        ---------------  Outpatient Follow-up:    Marquise Fraser)  Neurology  [address] [facility], [address]  [place] [address]  Phone: [phone]  Fax: [phone]  Follow Up Time:     Your Primary Care Provider,     Demetri Lee  Cardiac Electrophysiology  [address] [place] [address], [address]  [place] [address]  Phone: [phone]  Fax: [phone]  Follow Up Time:  --------------

## 2025-01-09 NOTE — PROGRESS NOTE ADULT - GASTROINTESTINAL
soft/nontender
soft/nontender
soft/nontender/nondistended
soft/nontender/normal active bowel sounds
soft/nontender
soft/nontender/nondistended
soft/nontender/normal active bowel sounds

## 2025-01-09 NOTE — PROGRESS NOTE ADULT - SUBJECTIVE AND OBJECTIVE BOX
Patient is a 69y old  Male who presents with a chief complaint of Right pontine stroke. (08 Jan 2025 08:56)      HPI:  Rigo Da Silva is a 69-year-old male with a past medical history of HTN presenting with a one day history of left sided weakness and a ground level fall concerning for an acute ischemic stroke and found to have a rib fracture. Code stroke was called and neurology consulted. NIHSS 4, LKW 12/23 2200. CT head with hypoattenuation in the right pontine location concerning for an acute ischemic stroke. Presented >24 hours from symptom onset. Likely 2/2 to intracranial atherosclerosis per neurology. PT/OT/PM&R evaluated patient and rec  acute inpatient rehab. Patient was optimized for discharge on 12/27 to [facility].    (27 Dec 2024 16:35)      PAST MEDICAL & SURGICAL HISTORY:  HTN (hypertension)      Unilateral inguinal hernia, without obstruction or gangrene, not specified as recurrent      H/O hernia repair  right      History of surgery on arm  fx with metal          MEDICATIONS  (STANDING):  amLODIPine   Tablet 10 milliGRAM(s) Oral daily  aspirin  chewable 81 milliGRAM(s) Oral daily  atorvastatin 80 milliGRAM(s) Oral at bedtime  clopidogrel Tablet 75 milliGRAM(s) Oral daily  enoxaparin Injectable 40 milliGRAM(s) SubCutaneous every 24 hours  FLUoxetine 10 milliGRAM(s) Oral daily  lidocaine   4% Patch 1 Patch Transdermal daily  pantoprazole    Tablet 40 milliGRAM(s) Oral before breakfast  polyethylene glycol 3350 17 Gram(s) Oral daily    MEDICATIONS  (PRN):  acetaminophen     Tablet .. 650 milliGRAM(s) Oral every 6 hours PRN Mild Pain (1 - 3)  melatonin 3 milliGRAM(s) Oral at bedtime PRN Insomnia      Allergies    No Known Allergies    Intolerances          VITALS  69y  Vital Signs Last 24 Hrs  T(C): 36.9 (09 Jan 2025 07:08), Max: 36.9 (09 Jan 2025 07:08)  T(F): 98.5 (09 Jan 2025 07:08), Max: 98.5 (09 Jan 2025 07:08)  HR: 63 (09 Jan 2025 07:08) (63 - 75)  BP: 134/71 (09 Jan 2025 07:08) (131/65 - 145/77)  BP(mean): --  RR: 14 (09 Jan 2025 07:08) (14 - 16)  SpO2: 95% (09 Jan 2025 07:08) (92% - 95%)    Parameters below as of 09 Jan 2025 07:08  Patient On (Oxygen Delivery Method): room air      Daily     Daily         RECENT LABS:                          13.0   8.47  )-----------( 374      ( 09 Jan 2025 08:08 )             38.7     01-09    139  |  102  |  15  ----------------------------<  100[H]  4.1   |  29  |  1.06    Ca    9.6      09 Jan 2025 08:08    TPro  8.5[H]  /  Alb  3.2[L]  /  TBili  0.7  /  DBili  x   /  AST  23  /  ALT  39  /  AlkPhos  78  01-09    LIVER FUNCTIONS - ( 09 Jan 2025 08:08 )  Alb: 3.2 g/dL / Pro: 8.5 g/dL / ALK PHOS: 78 U/L / ALT: 39 U/L / AST: 23 U/L / GGT: x             Urinalysis Basic - ( 09 Jan 2025 08:08 )    Color: x / Appearance: x / SG: x / pH: x  Gluc: 100 mg/dL / Ketone: x  / Bili: x / Urobili: x   Blood: x / Protein: x / Nitrite: x   Leuk Esterase: x / RBC: x / WBC x   Sq Epi: x / Non Sq Epi: x / Bacteria: x          CAPILLARY BLOOD GLUCOSE

## 2025-01-10 ENCOUNTER — TRANSCRIPTION ENCOUNTER (OUTPATIENT)
Age: 70
End: 2025-01-10

## 2025-01-10 VITALS
HEART RATE: 75 BPM | SYSTOLIC BLOOD PRESSURE: 132 MMHG | RESPIRATION RATE: 14 BRPM | DIASTOLIC BLOOD PRESSURE: 69 MMHG | TEMPERATURE: 98 F

## 2025-01-10 PROCEDURE — 96116 NUBHVL XM PHYS/QHP 1ST HR: CPT

## 2025-01-10 PROCEDURE — 99232 SBSQ HOSP IP/OBS MODERATE 35: CPT | Mod: GC

## 2025-01-10 RX ORDER — LISINOPRIL 30 MG/1
1 TABLET ORAL
Qty: 0 | Refills: 0 | DISCHARGE
Start: 2025-01-10

## 2025-01-10 RX ORDER — ENOXAPARIN SODIUM 60 MG/.6ML
40 INJECTION INTRAVENOUS; SUBCUTANEOUS
Qty: 0 | Refills: 0 | DISCHARGE
Start: 2025-01-10

## 2025-01-10 RX ORDER — LISINOPRIL 30 MG/1
2.5 TABLET ORAL DAILY
Refills: 0 | Status: DISCONTINUED | OUTPATIENT
Start: 2025-01-10 | End: 2025-01-10

## 2025-01-10 RX ADMIN — LISINOPRIL 2.5 MILLIGRAM(S): 30 TABLET ORAL at 12:04

## 2025-01-10 RX ADMIN — CLOPIDOGREL BISULFATE 75 MILLIGRAM(S): 75 TABLET, FILM COATED ORAL at 11:55

## 2025-01-10 RX ADMIN — Medication 10 MILLIGRAM(S): at 11:59

## 2025-01-10 RX ADMIN — Medication 10 MILLIGRAM(S): at 05:32

## 2025-01-10 RX ADMIN — PANTOPRAZOLE 40 MILLIGRAM(S): 40 TABLET, DELAYED RELEASE ORAL at 05:32

## 2025-01-10 RX ADMIN — LIDOCAINE 1 PATCH: 50 OINTMENT TOPICAL at 11:55

## 2025-01-10 RX ADMIN — Medication 17 GRAM(S): at 11:55

## 2025-01-10 RX ADMIN — ENOXAPARIN SODIUM 40 MILLIGRAM(S): 60 INJECTION INTRAVENOUS; SUBCUTANEOUS at 05:32

## 2025-01-10 RX ADMIN — Medication 81 MILLIGRAM(S): at 11:59

## 2025-01-10 NOTE — CONSULT NOTE ADULT - SUBJECTIVE AND OBJECTIVE BOX
Pt is a 69 year-old, right-handed male with a past medical history of HTN presenting with a one day history of left sided weakness and a ground level fall concerning for an acute ischemic stroke and found to have a rib fracture. Code stroke was called and neurology consulted. NIHSS 4, LKW 12/23 2200. CT head with hypoattenuation in the right pontine location concerning for an acute ischemic stroke. Presented >24 hours from symptom onset. Likely 2/2 to intracranial atherosclerosis per neurology. PT/OT/PM&R evaluated patient and rec  acute inpatient rehab. Patient was optimized for discharge on 12/27 to [facility]. PMHx: As noted above. Current meds: Prozac 10 mg QD. Please see list in Pt’s chart. Social Hx: Pt is  and has two children. Pt completed 10th grade in [employer], and is an [employer] . Pt lack hx of mental illness and substance abuse; he reports drinking socially. Pt is Amish. Pt enjoys following social media.    Findings: Pt was seen for an initial assessment of his cognitive and emotional functioning. The Modified MMSE (3MS) was administered; Pt’s results (91/100) were in the Normal range. His scores in standardized mood measures suggested moderate levels of anxiety and minimal to mild of depression (Anxiety, GAD7 = 13/21; Depression, PHQ9 = 4/27). Pt was alert, fully Ox3, and his attitude was cooperative. Attn/Conc: Simple auditory attention - intact.  Concentration/Working memory for reversed counting and spelling – intact (7/7). Language: Pt’s speech was of normal volume, pitch and pace. Naming - mildly impaired (4/5 body parts correctly named). Sentence repetition - intact. Auditory Comprehension - intact. Reading - intact. Writing - intact. Memory: Encoding of 3 words was intact (3/3); short- and long-delayed verbal recall – both intact (3/3). LTM was intact for US presidents (4/4). Visual memory – impaired. Visuospatial: Visuomotor integration –  mildly impaired for copy of a 2D figure (9/10), mild distortion and a 90 degree rotation were noted. Figure-ground discrimination was intact (4/4) for the Poppelreuter figure. Executive Functions: Motor Planning - intact. Organizational skills – mildly impaired for clock drawing on command; the hour hand pointed the wrong number. Abstract reasoning – mildly impaired for similarities (4/6). Initiation/Phonemic Fluency – moderately impaired (5/10 four-legged animals in 30 sec), also signaling slowed processing speed. Verbal problem solving – mildly impaired for 3 hypothetical scenarios. Emotional functioning: Affect - depressed. Mood - euthymic ("okay"); Pt reported experiencing anxiety, worry, and helplessness. On mood measures he additionally reported anxiety, worry, difficulty relaxing, restlessness, irritability, catastrophization, anhedonia, and decreased appetite. Thought processes were goal-directed. No abnormal thought contents were observed.  Pt denied any AH/VH. Pt also denied SI/HI/I/P. Insight - WFL. Judgment - fair.

## 2025-01-10 NOTE — DISCHARGE NOTE NURSING/CASE MANAGEMENT/SOCIAL WORK - FINANCIAL ASSISTANCE
[facility] provides services at a reduced cost to those who are determined to be eligible through [facility]’s financial assistance program. Information regarding [facility]’s financial assistance program can be found by going to [email or web address] or by calling [phone].

## 2025-01-10 NOTE — DISCHARGE NOTE NURSING/CASE MANAGEMENT/SOCIAL WORK - PATIENT PORTAL LINK FT
You can access the FollowMyHealth Patient Portal offered by [facility] by registering at the following website: [email or web address]. By joining [employer]’s FollowMyHealth portal, you will also be able to view your health information using other applications (apps) compatible with our system.

## 2025-01-10 NOTE — PROGRESS NOTE ADULT - REASON FOR ADMISSION
Right pontine stroke.

## 2025-01-10 NOTE — PROGRESS NOTE ADULT - ASSESSMENT
Rigo Da Silva is a 69-year-old male with a past medical history of HTN presenting with a one day history of left sided weakness and a ground level fall , found to have pontine CVA c/b rib fracture.     # Right pontine stroke and left body involvement, dysarthria  - MRI: R pontine stroke, likely 2/2 to atherosclerosis  - Comprehensive Multidisciplinary Rehab Program: 3 hours a day, 5 days a week with PT/OT/SLP  - prozac 10 mg 1/5. Patient with increased left UE ROM noted, also noted improvement mood  - psychology support for adjustment symptoms   - Precautions: cardiac, fall, aspiration    # rib fractures  - Incentive spirometry  - pain mgt as below    # HTN  - amlodipine 5 mg daily  - BP  (131/65 - 145/77) 1/9 discussed with patient    # asymptomatic drop in hgb  - pantoprazole added  - Stool guaiac NEGATIVE 1/7  - hgb 14 --> 12.7 1/3-->  13.4 1/3 --> 12.6 1/6--> 13.0 1/9 stable  - PCP f/u on dc    # borderline leukocytosis  -  asymptomatic, no fever  - WBC 8.47 1/9 resolved    # mild transaminitis  -  AST  23  /  ALT  39  /  AlkPhos  78  01-09 resolved  - PCP f./u on dc    # Sleep:  - PRN 3mg melatonin    # Pain Management:  - Tylenol PRN  - lidocaine patch prn  - ibuprofen prn    # GI/Bowel:  - Senna QHS  - Miralax daily PRN  - pantoprazole added due to DAPT 1/3    # Diet:   - regular with thins     # DVT ppx:  - Lovenox 40 mg SQ     # Case discussed in IDT rounds 1/8 (follow up)  - continent, not impulsive, O x 4. Has been accepted to [facility] rehab, has bed assigned, regular solid thin liquids, language WFL with mild cog deficits, decreased divided attention, improving memory using strategies, reduced iADLs, mod toilet transfers, min-mod toileting, min LB dressing, set up eating, min assist bed mobility and transfers, ambulates 100 feet with WBQC and min assist, calm  - barriers: lives alone, rib pain, steps, left HP, no support on dc, cognitive impairment  - goals: supervision transfers and ambulation household level with cane, 8-12 steps CS, cg for transfer and toileting goal   Cs/supervision bADLs "to become independent and return back to work" "Remberto" Recall therapy recommendations independently (progressing), supervision transfers CS (Progressing)  - target: 1/10 to [facility]. On track, patient aware and agreeable        ---------------  Outpatient Follow-up:    Marquise Fraser)  Neurology  [address], [address]  [place] [address]  Phone: [phone]  Fax: [phone]  Follow Up Time:     Your Primary Care Provider,     Demetri Lee  Cardiac Electrophysiology  [address] [place] [address], [address]  [place] [address]  Phone: [phone]  Fax: [phone]  Follow Up Time:  --------------     Rgio Da Silva is a 69-year-old male with a past medical history of HTN presenting with a one day history of left sided weakness and a ground level fall , found to have pontine CVA c/b rib fracture.     # Right pontine stroke and left body involvement, dysarthria  - MRI: R pontine stroke, likely 2/2 to atherosclerosis  - Comprehensive Multidisciplinary Rehab Program: 3 hours a day, 5 days a week with PT/OT/SLP  - prozac 10 mg 1/5. Patient with increased left UE ROM noted, also noted improvement mood  - psychology support for adjustment symptoms   - Precautions: cardiac, fall, aspiration    # rib fractures  - Incentive spirometry  - pain mgt as below    # HTN  - amlodipine 5 mg daily  - BP  (131/65 - 145/77) 1/9 discussed with patient    # asymptomatic drop in hgb  - pantoprazole added  - Stool guaiac NEGATIVE 1/7  - hgb 14 --> 12.7 1/3-->  13.4 1/3 --> 12.6 1/6--> 13.0 1/9 stable  - PCP f/u on dc    # borderline leukocytosis  -  asymptomatic, no fever  - WBC 8.47 1/9 resolved    # mild transaminitis  -  AST  23  /  ALT  39  /  AlkPhos  78  01-09 resolved  - PCP f./u on dc    # Sleep:  - PRN 3mg melatonin    # Pain Management:  - Tylenol PRN  - lidocaine patch prn  - ibuprofen prn    # GI/Bowel:  - Senna QHS  - Miralax daily PRN  - pantoprazole added due to DAPT 1/3    # Diet:   - regular with thins     # DVT ppx:  - Lovenox 40 mg SQ

## 2025-01-10 NOTE — PROGRESS NOTE ADULT - PROVIDER SPECIALTY LIST ADULT
Hospitalist
Hospitalist
Physiatry
Hospitalist
Physiatry
Rehab Medicine
Rehab Medicine
Hospitalist
Physiatry
Hospitalist
Neuropsychology
Physiatry
Hospitalist
Hospitalist
Physiatry
Physiatry

## 2025-01-10 NOTE — PROGRESS NOTE ADULT - SUBJECTIVE AND OBJECTIVE BOX
Patient is a 69y old  Male who presents with a chief complaint of Right pontine stroke. (08 Jan 2025 08:56)      HPI:  Rigo Da Silva is a 69-year-old male with a past medical history of HTN presenting with a one day history of left sided weakness and a ground level fall concerning for an acute ischemic stroke and found to have a rib fracture. Code stroke was called and neurology consulted. NIHSS 4, LKW 12/23 2200. CT head with hypoattenuation in the right pontine location concerning for an acute ischemic stroke. Presented >24 hours from symptom onset. Likely 2/2 to intracranial atherosclerosis per neurology. PT/OT/PM&R evaluated patient and rec  acute inpatient rehab. Patient was optimized for discharge on 12/27 to [facility].    (27 Dec 2024 16:35)      PAST MEDICAL & SURGICAL HISTORY:  HTN (hypertension)      Unilateral inguinal hernia, without obstruction or gangrene, not specified as recurrent      H/O hernia repair  right      History of surgery on arm  fx with metal          MEDICATIONS  (STANDING):  amLODIPine   Tablet 10 milliGRAM(s) Oral daily  aspirin  chewable 81 milliGRAM(s) Oral daily  atorvastatin 80 milliGRAM(s) Oral at bedtime  clopidogrel Tablet 75 milliGRAM(s) Oral daily  enoxaparin Injectable 40 milliGRAM(s) SubCutaneous every 24 hours  FLUoxetine 10 milliGRAM(s) Oral daily  lidocaine   4% Patch 1 Patch Transdermal daily  pantoprazole    Tablet 40 milliGRAM(s) Oral before breakfast  polyethylene glycol 3350 17 Gram(s) Oral daily    MEDICATIONS  (PRN):  acetaminophen     Tablet .. 650 milliGRAM(s) Oral every 6 hours PRN Mild Pain (1 - 3)  melatonin 3 milliGRAM(s) Oral at bedtime PRN Insomnia      Allergies    No Known Allergies    Intolerances          VITALS  69y  Vital Signs Last 24 Hrs  Vital Signs Last 24 Hrs  T(C): 36.7 (10 Yadiel 2025 07:27), Max: 36.7 (10 Yadiel 2025 07:27)  T(F): 98 (10 Yadiel 2025 07:27), Max: 98 (10 Yadiel 2025 07:27)  HR: 84 (10 Yadiel 2025 07:27) (70 - 84)  BP: 154/77 (10 Yadiel 2025 07:27) (152/71 - 155/78)  BP(mean): --  RR: 15 (10 Yadiel 2025 07:27) (15 - 15)  SpO2: 96% (10 Yadiel 2025 07:27) (94% - 96%)    Parameters below as of 10 Yadiel 2025 07:27  Patient On (Oxygen Delivery Method): room air          RECENT LABS:                          13.0   8.47  )-----------( 374      ( 09 Jan 2025 08:08 )             38.7     01-09    139  |  102  |  15  ----------------------------<  100[H]  4.1   |  29  |  1.06    Ca    9.6      09 Jan 2025 08:08    TPro  8.5[H]  /  Alb  3.2[L]  /  TBili  0.7  /  DBili  x   /  AST  23  /  ALT  39  /  AlkPhos  78  01-09    LIVER FUNCTIONS - ( 09 Jan 2025 08:08 )  Alb: 3.2 g/dL / Pro: 8.5 g/dL / ALK PHOS: 78 U/L / ALT: 39 U/L / AST: 23 U/L / GGT: x             Urinalysis Basic - ( 09 Jan 2025 08:08 )    Color: x / Appearance: x / SG: x / pH: x  Gluc: 100 mg/dL / Ketone: x  / Bili: x / Urobili: x   Blood: x / Protein: x / Nitrite: x   Leuk Esterase: x / RBC: x / WBC x   Sq Epi: x / Non Sq Epi: x / Bacteria: x          CAPILLARY BLOOD GLUCOSE        Review of Systems:   · Additional ROS  	Patient assessed in bed. Pt reports sleeping well. NAD  or overnight issues. Pt aware discharge pending today to [facility] today at 2 pm. Happy with improvement with LUE.  Discussed adding Lisinopril 2.5mg to HTN regimen. All questions were answered and they expressed understanding and agreement.     Physical Exam:   · Constitutional Comments	alert, pleasant O x 3-4 NAD afebrile  · Respiratory	clear to auscultation bilaterally; no wheezes; no rales; no rhonchi  · Cardiovascular	regular rate and rhythm; S1 S2 present; no gallops; no rub; no murmur  · Gastrointestinal	soft; nontender; normal active bowel sounds  · Motor	left shoulder 2/5 elbow flexion 3/5 extension 3/5 finger flexion 2/5  ROM WFL  left HF 4+/5 quad 5-/5 ankle DF 5-/5 PF 5-./5  calves soft no TTP

## 2025-01-10 NOTE — PROGRESS NOTE ADULT - NS ATTEND AMEND GEN_ALL_CORE FT
I have personally seen and examined the patient.  I fully participated in the care of this patient.  I have made amendments to the documentation where necessary, and agree with the history, physical exam, and plan as documented above  patient seen at bedside while sitting in the wheelchair, Uneventful night  looking forward to going to [facility] today  Total time 35mins-face to face time encounter and counseling the patient, meeting with hospitalist, nursing staff, therapists and social work to discuss medical updates and management, care coordination

## 2025-01-10 NOTE — CONSULT NOTE ADULT - ASSESSMENT
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 Pt exhibited difficulty with aspects of language (including naming and fluency), visuospatial skills (i.e., visuomotor integration) and executive/higher-level cognitive functions (including organizational skills, abstract reasoning, problem solving and initiation). Mild language difficulties noticed may in part be due to his ESL status. His affect is depressed and he reports several anxiety symptoms and some depressive symptoms in response to his current medical condition. FIM scores: Social Interaction 4; Problem Solving 5; Memory 5.    Plan: Individual supportive psychotherapy to monitor cognition, affect/mood, and behavior. Continue with his antidepressant medication. Cognitive remediation during speech therapy and occupational therapy sessions is recommended, both in the [facility] and [facility] settings. Participation in recreation/art therapy in order to have pleasure and mastery experiences and regain/reestablish a sense of routine.    Time spent with Pt, 45 minutes.    
Rigo Da Silva is a 69-year-old male with a past medical history of HTN presenting with a one day history of left sided weakness and a ground level fall concerning for an acute ischemic stroke c/b rib fracture.     # Right pontine stroke  - had fall on 12/23 2/2 to weakness  - went to ED on 12/25  - MRI showed R pontine stroke, likely 2/2 to atherosclerosis  - Comprehensive Multidisciplinary Rehab   - AFO for left foot drop   - ambulate with kari walker  - aspirin, plavix and high intensity statin    #HTN  - home med amlodipine 10  - continue Norvasc at 5    #other:  -miralax    modified consistency diet, SLP consult  Lovenox 40 mg SQ

## 2025-01-10 NOTE — DISCHARGE NOTE NURSING/CASE MANAGEMENT/SOCIAL WORK - NSDPFAC_GEN_ALL_CORE
LVM for patient.  [facility] Pugh is requesting a follow up appointment with you.  Please give the office a call back at [phone] so that I may assist in scheduling your appointment.   [facility] MD, [facility] [place]

## 2025-02-11 PROCEDURE — 97530 THERAPEUTIC ACTIVITIES: CPT | Mod: GO

## 2025-02-11 PROCEDURE — 92507 TX SP LANG VOICE COMM INDIV: CPT | Mod: GN

## 2025-02-11 PROCEDURE — 97535 SELF CARE MNGMENT TRAINING: CPT | Mod: GO

## 2025-02-11 PROCEDURE — 85025 COMPLETE CBC W/AUTO DIFF WBC: CPT

## 2025-02-11 PROCEDURE — 87635 SARS-COV-2 COVID-19 AMP PRB: CPT

## 2025-02-11 PROCEDURE — 97112 NEUROMUSCULAR REEDUCATION: CPT | Mod: GO

## 2025-02-11 PROCEDURE — 97165 OT EVAL LOW COMPLEX 30 MIN: CPT | Mod: GO

## 2025-02-11 PROCEDURE — 36415 COLL VENOUS BLD VENIPUNCTURE: CPT

## 2025-02-11 PROCEDURE — 97110 THERAPEUTIC EXERCISES: CPT | Mod: GP

## 2025-02-11 PROCEDURE — 85027 COMPLETE CBC AUTOMATED: CPT

## 2025-02-11 PROCEDURE — 92523 SPEECH SOUND LANG COMPREHEN: CPT | Mod: GN

## 2025-02-11 PROCEDURE — 80053 COMPREHEN METABOLIC PANEL: CPT

## 2025-02-11 PROCEDURE — 82272 OCCULT BLD FECES 1-3 TESTS: CPT

## 2025-02-11 PROCEDURE — 92610 EVALUATE SWALLOWING FUNCTION: CPT | Mod: GN

## 2025-02-11 PROCEDURE — 97116 GAIT TRAINING THERAPY: CPT | Mod: GP

## 2025-02-11 PROCEDURE — 97161 PT EVAL LOW COMPLEX 20 MIN: CPT | Mod: GP

## 2025-05-30 NOTE — DISCHARGE NOTE NURSING/CASE MANAGEMENT/SOCIAL WORK - NSDCPEFALRISK_GEN_ALL_CORE
For information on Fall & Injury Prevention, visit: [email or web address] OR  [email or web address] OR  https://www.cdc.gov/steadi/patient.html
not applicable

## 2025-07-14 NOTE — H&P PST ADULT - SKIN/BREAST
[Time Spent: ___ minutes] : I have spent [unfilled] minutes of time on the encounter which excludes teaching and separately reported services. details…

## (undated) DEVICE — ELCTR GROUNDING PAD ADULT COVIDIEN

## (undated) DEVICE — SPONGE DISSECTOR PEANUT

## (undated) DEVICE — GLV 7 PROTEXIS

## (undated) DEVICE — SUT POLYSORB 2-0 30" V-20 UNDYED

## (undated) DEVICE — DRAIN PENROSE 5/8" X 18"

## (undated) DEVICE — SUT POLYSORB 4-0 27" P-12 UNDYED

## (undated) DEVICE — DRAPE LIGHT HANDLE COVER BLUE

## (undated) DEVICE — SUT POLYSORB 2-0 18" TIES UNDYED

## (undated) DEVICE — FOR-ESU VALLEYLAB T7E15009DX: Type: DURABLE MEDICAL EQUIPMENT

## (undated) DEVICE — DRAPE LAPAROTOMY W POUCHES

## (undated) DEVICE — PACK MINOR NO DRAPE

## (undated) DEVICE — PREP CHLORAPREP ORANGE 2PCT 26ML

## (undated) DEVICE — DRSG MASTISOL

## (undated) DEVICE — DRSG 4X4

## (undated) DEVICE — DRSG TEGADERM 4X4.75"

## (undated) DEVICE — WRAP COMPRESSION CALF MED

## (undated) DEVICE — BLANKET WARMER UPPER ADULT

## (undated) DEVICE — SUT SOFSILK 2-0 30" V-20

## (undated) DEVICE — NDL HYPO SAFE 25G X 1.5"

## (undated) DEVICE — SOL IRR POUR NS 0.9% 1500ML

## (undated) DEVICE — SUT SURGIPRO 2-0 30" GS-22

## (undated) DEVICE — SUT POLYSORB 3-0 30" V-20 UNDYED